# Patient Record
Sex: FEMALE | HISPANIC OR LATINO | Employment: UNEMPLOYED | URBAN - METROPOLITAN AREA
[De-identification: names, ages, dates, MRNs, and addresses within clinical notes are randomized per-mention and may not be internally consistent; named-entity substitution may affect disease eponyms.]

---

## 2020-03-23 ENCOUNTER — OFFICE VISIT (OUTPATIENT)
Dept: FAMILY MEDICINE CLINIC | Facility: CLINIC | Age: 35
End: 2020-03-23
Payer: COMMERCIAL

## 2020-03-23 VITALS
DIASTOLIC BLOOD PRESSURE: 76 MMHG | TEMPERATURE: 98.4 F | BODY MASS INDEX: 36.7 KG/M2 | HEIGHT: 64 IN | WEIGHT: 215 LBS | SYSTOLIC BLOOD PRESSURE: 102 MMHG

## 2020-03-23 DIAGNOSIS — H81.10 BENIGN PAROXYSMAL POSITIONAL VERTIGO, UNSPECIFIED LATERALITY: ICD-10-CM

## 2020-03-23 DIAGNOSIS — G89.29 CHRONIC ARTHRALGIAS OF KNEES AND HIPS: ICD-10-CM

## 2020-03-23 DIAGNOSIS — M79.10 MYALGIA: ICD-10-CM

## 2020-03-23 DIAGNOSIS — M25.551 CHRONIC ARTHRALGIAS OF KNEES AND HIPS: ICD-10-CM

## 2020-03-23 DIAGNOSIS — F51.01 PRIMARY INSOMNIA: Primary | ICD-10-CM

## 2020-03-23 DIAGNOSIS — J30.2 SEASONAL ALLERGIES: ICD-10-CM

## 2020-03-23 DIAGNOSIS — M25.561 CHRONIC ARTHRALGIAS OF KNEES AND HIPS: ICD-10-CM

## 2020-03-23 DIAGNOSIS — M25.552 CHRONIC ARTHRALGIAS OF KNEES AND HIPS: ICD-10-CM

## 2020-03-23 DIAGNOSIS — F32.0 CURRENT MILD EPISODE OF MAJOR DEPRESSIVE DISORDER WITHOUT PRIOR EPISODE (HCC): ICD-10-CM

## 2020-03-23 DIAGNOSIS — Z86.59 HISTORY OF ANXIETY DISORDER: ICD-10-CM

## 2020-03-23 DIAGNOSIS — M25.562 CHRONIC ARTHRALGIAS OF KNEES AND HIPS: ICD-10-CM

## 2020-03-23 PROCEDURE — 1036F TOBACCO NON-USER: CPT | Performed by: FAMILY MEDICINE

## 2020-03-23 PROCEDURE — 99204 OFFICE O/P NEW MOD 45 MIN: CPT | Performed by: FAMILY MEDICINE

## 2020-03-23 PROCEDURE — 3008F BODY MASS INDEX DOCD: CPT | Performed by: FAMILY MEDICINE

## 2020-03-23 RX ORDER — METHOCARBAMOL 500 MG/1
500 TABLET, FILM COATED ORAL 3 TIMES DAILY PRN
COMMUNITY
End: 2020-03-23

## 2020-03-23 RX ORDER — GABAPENTIN 100 MG/1
100 CAPSULE ORAL 3 TIMES DAILY
Qty: 90 CAPSULE | Refills: 2 | Status: SHIPPED | OUTPATIENT
Start: 2020-03-23 | End: 2020-03-24

## 2020-03-23 RX ORDER — MECLIZINE HYDROCHLORIDE 25 MG/1
25 TABLET ORAL 3 TIMES DAILY PRN
COMMUNITY
End: 2020-03-23 | Stop reason: SDUPTHER

## 2020-03-23 RX ORDER — CYCLOBENZAPRINE HCL 5 MG
5 TABLET ORAL
Qty: 30 TABLET | Refills: 2 | Status: SHIPPED | OUTPATIENT
Start: 2020-03-23 | End: 2021-11-04 | Stop reason: SDUPTHER

## 2020-03-23 RX ORDER — OMEPRAZOLE 20 MG/1
20 CAPSULE, DELAYED RELEASE ORAL AS NEEDED
COMMUNITY
Start: 2019-01-02 | End: 2020-06-19 | Stop reason: SDUPTHER

## 2020-03-23 RX ORDER — MECLIZINE HYDROCHLORIDE 25 MG/1
25 TABLET ORAL 3 TIMES DAILY PRN
Qty: 30 TABLET | Refills: 2 | Status: SHIPPED | OUTPATIENT
Start: 2020-03-23 | End: 2020-04-23 | Stop reason: SDUPTHER

## 2020-03-23 RX ORDER — ALBUTEROL SULFATE 90 UG/1
2 AEROSOL, METERED RESPIRATORY (INHALATION) EVERY 4 HOURS PRN
COMMUNITY
Start: 2017-12-12 | End: 2020-04-23 | Stop reason: SDUPTHER

## 2020-03-23 RX ORDER — BUDESONIDE AND FORMOTEROL FUMARATE DIHYDRATE 80; 4.5 UG/1; UG/1
2 AEROSOL RESPIRATORY (INHALATION) 2 TIMES DAILY
COMMUNITY
End: 2020-04-23 | Stop reason: SDUPTHER

## 2020-03-23 RX ORDER — FAMOTIDINE 40 MG/1
20 TABLET, FILM COATED ORAL 2 TIMES DAILY PRN
COMMUNITY
Start: 2018-10-23 | End: 2021-03-18

## 2020-03-23 NOTE — PROGRESS NOTES
Assessment/Plan:  Patient use gabapentin as directed  Patient use Flexeril at night  Patient go for laboratory studies  Further workup pending results  Follow-up in 1 month       Diagnoses and all orders for this visit:    Primary insomnia  -     CBC and differential; Future  -     Comprehensive metabolic panel; Future  -     Lipid panel; Future  -     TSH, 3rd generation with Free T4 reflex; Future  -     RUFINO Screen w/ Reflex to Titer/Pattern; Future  -     C-reactive protein; Future  -     CK (with reflex to MB); Future  -     Lyme Antibody Profile with reflex to WB; Future  -     Vitamin B12; Future  -     Rheumatoid Arthritis Diagnostic Panel 1; Future  -     Magnesium; Future  -     SCLERODERMA DIAGNOSTIC PROFILE; Future  -     cyclobenzaprine (FLEXERIL) 5 mg tablet; Take 1 tablet (5 mg total) by mouth daily at bedtime    Myalgia  -     CBC and differential; Future  -     Comprehensive metabolic panel; Future  -     Lipid panel; Future  -     TSH, 3rd generation with Free T4 reflex; Future  -     RUFINO Screen w/ Reflex to Titer/Pattern; Future  -     C-reactive protein; Future  -     CK (with reflex to MB); Future  -     Lyme Antibody Profile with reflex to WB; Future  -     Vitamin B12; Future  -     Rheumatoid Arthritis Diagnostic Panel 1; Future  -     Magnesium; Future  -     SCLERODERMA DIAGNOSTIC PROFILE; Future  -     cyclobenzaprine (FLEXERIL) 5 mg tablet; Take 1 tablet (5 mg total) by mouth daily at bedtime  -     gabapentin (NEURONTIN) 100 mg capsule; Take 1 capsule (100 mg total) by mouth 3 (three) times a day    Benign paroxysmal positional vertigo, unspecified laterality  -     CBC and differential; Future  -     Comprehensive metabolic panel; Future  -     Lipid panel; Future  -     TSH, 3rd generation with Free T4 reflex; Future  -     RUFINO Screen w/ Reflex to Titer/Pattern; Future  -     C-reactive protein; Future  -     CK (with reflex to MB);  Future  -     Lyme Antibody Profile with reflex to WB; Future  -     Vitamin B12; Future  -     Rheumatoid Arthritis Diagnostic Panel 1; Future  -     Magnesium; Future  -     SCLERODERMA DIAGNOSTIC PROFILE; Future  -     meclizine (ANTIVERT) 25 mg tablet; Take 1 tablet (25 mg total) by mouth 3 (three) times a day as needed for dizziness    Current mild episode of major depressive disorder without prior episode (HCC)    Seasonal allergies  -     CBC and differential; Future  -     Comprehensive metabolic panel; Future  -     Lipid panel; Future  -     TSH, 3rd generation with Free T4 reflex; Future  -     RUFINO Screen w/ Reflex to Titer/Pattern; Future  -     C-reactive protein; Future  -     CK (with reflex to MB); Future  -     Lyme Antibody Profile with reflex to WB; Future  -     Vitamin B12; Future  -     Rheumatoid Arthritis Diagnostic Panel 1; Future  -     Magnesium; Future  -     SCLERODERMA DIAGNOSTIC PROFILE; Future    History of anxiety disorder  -     CBC and differential; Future  -     Comprehensive metabolic panel; Future  -     Lipid panel; Future  -     TSH, 3rd generation with Free T4 reflex; Future  -     RUFNIO Screen w/ Reflex to Titer/Pattern; Future  -     C-reactive protein; Future  -     CK (with reflex to MB); Future  -     Lyme Antibody Profile with reflex to WB; Future  -     Vitamin B12; Future  -     Rheumatoid Arthritis Diagnostic Panel 1; Future  -     Magnesium; Future  -     SCLERODERMA DIAGNOSTIC PROFILE; Future    Chronic arthralgias of knees and hips  -     CBC and differential; Future  -     Comprehensive metabolic panel; Future  -     Lipid panel; Future  -     TSH, 3rd generation with Free T4 reflex; Future  -     RUFINO Screen w/ Reflex to Titer/Pattern; Future  -     C-reactive protein; Future  -     CK (with reflex to MB); Future  -     Lyme Antibody Profile with reflex to WB; Future  -     Vitamin B12; Future  -     Rheumatoid Arthritis Diagnostic Panel 1; Future  -     Magnesium;  Future  -     SCLERODERMA DIAGNOSTIC PROFILE; Future  -     cyclobenzaprine (FLEXERIL) 5 mg tablet; Take 1 tablet (5 mg total) by mouth daily at bedtime  -     gabapentin (NEURONTIN) 100 mg capsule; Take 1 capsule (100 mg total) by mouth 3 (three) times a day    Other orders  -     Discontinue: meclizine (ANTIVERT) 25 mg tablet; Take 25 mg by mouth 3 (three) times a day as needed for dizziness  -     sertraline (ZOLOFT) 50 mg tablet; Take 50 mg by mouth daily  -     Discontinue: methocarbamol (ROBAXIN) 500 mg tablet; Take 500 mg by mouth 3 (three) times a day as needed for muscle spasms  -     albuterol (PROVENTIL HFA,VENTOLIN HFA) 90 mcg/act inhaler; Inhale 2 puffs every 4 (four) hours as needed  -     budesonide-formoterol (Symbicort) 80-4 5 MCG/ACT inhaler; Inhale 2 puffs 2 (two) times a day  -     Levonorgestrel (LILETTA) 19 5 MCG/DAY IUD IUD; 1 each by Intrauterine route  -     omeprazole (PriLOSEC) 20 mg delayed release capsule; Take 20 mg by mouth as needed  -     famotidine (PEPCID) 40 MG tablet; Take 20 mg by mouth 2 (two) times a day as needed            Subjective:        Patient ID: Sarah Whitley is a 36 y o  female  Patient is here as a new patient to establish care  Past medical history surgical history allergies medications family history social history all reviewed at the present time  Patient will have pain diffusely throughout body intermittently  Patient does get pain and lower back also  Pain worse in the cold and with rain  No fevers  The patient having difficulty staying asleep due to the pain  Patient has not been physical therapy  No laboratory studies done  Patient has use meloxicam without any improvement  Patient did use Robaxin with some improvement at night  Patient does get some GERD symptoms intermittently associated with chest pain  Patient does notice some shortness of breath with significant exertion  Patient with history of asthma  No significant change in urination or defecation    Patient with arthralgias  The following portions of the patient's history were reviewed and updated as appropriate: allergies, current medications, past family history, past medical history, past social history, past surgical history and problem list       Review of Systems   Constitutional: Negative  HENT: Negative  Eyes: Negative  Respiratory: Positive for shortness of breath  Cardiovascular: Positive for chest pain  Gastrointestinal: Negative  Endocrine: Negative  Genitourinary: Negative  Musculoskeletal: Positive for back pain  Skin: Negative  Allergic/Immunologic: Negative  Neurological: Negative  Hematological: Negative  Psychiatric/Behavioral: Positive for sleep disturbance  Objective:      BMI Counseling: Body mass index is 36 62 kg/m²  The BMI is above normal  Nutrition recommendations include decreasing portion sizes  Exercise recommendations include moderate physical activity 150 minutes/week  /76 (BP Location: Left arm, Patient Position: Sitting, Cuff Size: Standard)   Temp 98 4 °F (36 9 °C) (Tympanic)   Ht 5' 4 25" (1 632 m)   Wt 97 5 kg (215 lb)   LMP 03/02/2020 (Approximate)   BMI 36 62 kg/m²          Physical Exam   Constitutional: She appears well-developed and well-nourished  No distress  HENT:   Head: Normocephalic  Right Ear: External ear normal    Left Ear: External ear normal    Mouth/Throat: Oropharynx is clear and moist  No oropharyngeal exudate  Eyes: Pupils are equal, round, and reactive to light  EOM are normal  Right eye exhibits no discharge  Left eye exhibits no discharge  No scleral icterus  Neck: Normal range of motion  Neck supple  No thyromegaly present  Cardiovascular: Normal rate, regular rhythm, normal heart sounds and intact distal pulses  Exam reveals no gallop and no friction rub  No murmur heard  Pulmonary/Chest: Effort normal and breath sounds normal  No respiratory distress  She has no wheezes  She has no rales  She exhibits no tenderness  Abdominal: Soft  Bowel sounds are normal  She exhibits no distension  There is no tenderness  There is no rebound and no guarding  Musculoskeletal: She exhibits tenderness  She exhibits no edema  Diffuse myalgias with palpation of thighs and calves the along with biceps and pain with range of motion with upper extremities and lower extremities  Lymphadenopathy:     She has no cervical adenopathy  Neurological: She is alert  No cranial nerve deficit  She exhibits normal muscle tone  Coordination normal    Skin: Skin is warm and dry  No rash noted  She is not diaphoretic  No erythema  No pallor  Psychiatric: She has a normal mood and affect  Her behavior is normal  Judgment and thought content normal    Nursing note and vitals reviewed

## 2020-03-24 ENCOUNTER — APPOINTMENT (OUTPATIENT)
Dept: LAB | Facility: HOSPITAL | Age: 35
End: 2020-03-24
Payer: COMMERCIAL

## 2020-03-24 DIAGNOSIS — M25.562 CHRONIC ARTHRALGIAS OF KNEES AND HIPS: ICD-10-CM

## 2020-03-24 DIAGNOSIS — J30.2 SEASONAL ALLERGIES: ICD-10-CM

## 2020-03-24 DIAGNOSIS — M25.551 CHRONIC ARTHRALGIAS OF KNEES AND HIPS: ICD-10-CM

## 2020-03-24 DIAGNOSIS — Z86.59 HISTORY OF ANXIETY DISORDER: ICD-10-CM

## 2020-03-24 DIAGNOSIS — G89.29 CHRONIC ARTHRALGIAS OF KNEES AND HIPS: ICD-10-CM

## 2020-03-24 DIAGNOSIS — M25.561 CHRONIC ARTHRALGIAS OF KNEES AND HIPS: ICD-10-CM

## 2020-03-24 DIAGNOSIS — M79.10 MYALGIA: ICD-10-CM

## 2020-03-24 DIAGNOSIS — M25.552 CHRONIC ARTHRALGIAS OF KNEES AND HIPS: ICD-10-CM

## 2020-03-24 DIAGNOSIS — H81.10 BENIGN PAROXYSMAL POSITIONAL VERTIGO, UNSPECIFIED LATERALITY: ICD-10-CM

## 2020-03-24 DIAGNOSIS — F51.01 PRIMARY INSOMNIA: ICD-10-CM

## 2020-03-24 LAB
ALBUMIN SERPL BCP-MCNC: 3.4 G/DL (ref 3.5–5)
ALP SERPL-CCNC: 97 U/L (ref 46–116)
ALT SERPL W P-5'-P-CCNC: 21 U/L (ref 12–78)
ANION GAP SERPL CALCULATED.3IONS-SCNC: 8 MMOL/L (ref 4–13)
AST SERPL W P-5'-P-CCNC: 16 U/L (ref 5–45)
BASOPHILS # BLD AUTO: 0.03 THOUSANDS/ΜL (ref 0–0.1)
BASOPHILS NFR BLD AUTO: 0 % (ref 0–1)
BILIRUB SERPL-MCNC: 0.32 MG/DL (ref 0.2–1)
BUN SERPL-MCNC: 10 MG/DL (ref 5–25)
CALCIUM SERPL-MCNC: 8.5 MG/DL (ref 8.3–10.1)
CHLORIDE SERPL-SCNC: 105 MMOL/L (ref 100–108)
CHOLEST SERPL-MCNC: 145 MG/DL (ref 50–200)
CK SERPL-CCNC: 96 U/L (ref 26–192)
CO2 SERPL-SCNC: 26 MMOL/L (ref 21–32)
CREAT SERPL-MCNC: 0.72 MG/DL (ref 0.6–1.3)
CRP SERPL QL: 19.1 MG/L
EOSINOPHIL # BLD AUTO: 0.11 THOUSAND/ΜL (ref 0–0.61)
EOSINOPHIL NFR BLD AUTO: 2 % (ref 0–6)
ERYTHROCYTE [DISTWIDTH] IN BLOOD BY AUTOMATED COUNT: 13.6 % (ref 11.6–15.1)
GFR SERPL CREATININE-BSD FRML MDRD: 127 ML/MIN/1.73SQ M
GLUCOSE P FAST SERPL-MCNC: 97 MG/DL (ref 65–99)
HCT VFR BLD AUTO: 40 % (ref 34.8–46.1)
HDLC SERPL-MCNC: 36 MG/DL
HGB BLD-MCNC: 12.6 G/DL (ref 11.5–15.4)
IMM GRANULOCYTES # BLD AUTO: 0.02 THOUSAND/UL (ref 0–0.2)
IMM GRANULOCYTES NFR BLD AUTO: 0 % (ref 0–2)
LDLC SERPL CALC-MCNC: 99 MG/DL (ref 0–100)
LYMPHOCYTES # BLD AUTO: 1.89 THOUSANDS/ΜL (ref 0.6–4.47)
LYMPHOCYTES NFR BLD AUTO: 28 % (ref 14–44)
MAGNESIUM SERPL-MCNC: 2.1 MG/DL (ref 1.6–2.6)
MCH RBC QN AUTO: 28.8 PG (ref 26.8–34.3)
MCHC RBC AUTO-ENTMCNC: 31.5 G/DL (ref 31.4–37.4)
MCV RBC AUTO: 92 FL (ref 82–98)
MONOCYTES # BLD AUTO: 0.43 THOUSAND/ΜL (ref 0.17–1.22)
MONOCYTES NFR BLD AUTO: 6 % (ref 4–12)
NEUTROPHILS # BLD AUTO: 4.2 THOUSANDS/ΜL (ref 1.85–7.62)
NEUTS SEG NFR BLD AUTO: 64 % (ref 43–75)
NONHDLC SERPL-MCNC: 109 MG/DL
NRBC BLD AUTO-RTO: 0 /100 WBCS
PLATELET # BLD AUTO: 340 THOUSANDS/UL (ref 149–390)
PMV BLD AUTO: 9.5 FL (ref 8.9–12.7)
POTASSIUM SERPL-SCNC: 4 MMOL/L (ref 3.5–5.3)
PROT SERPL-MCNC: 7.7 G/DL (ref 6.4–8.2)
RBC # BLD AUTO: 4.37 MILLION/UL (ref 3.81–5.12)
SODIUM SERPL-SCNC: 139 MMOL/L (ref 136–145)
TRIGL SERPL-MCNC: 48 MG/DL
TSH SERPL DL<=0.05 MIU/L-ACNC: 2.72 UIU/ML (ref 0.36–3.74)
VIT B12 SERPL-MCNC: 306 PG/ML (ref 100–900)
WBC # BLD AUTO: 6.68 THOUSAND/UL (ref 4.31–10.16)

## 2020-03-24 PROCEDURE — 86618 LYME DISEASE ANTIBODY: CPT

## 2020-03-24 PROCEDURE — 36415 COLL VENOUS BLD VENIPUNCTURE: CPT

## 2020-03-24 PROCEDURE — 83735 ASSAY OF MAGNESIUM: CPT

## 2020-03-24 PROCEDURE — 80053 COMPREHEN METABOLIC PANEL: CPT

## 2020-03-24 PROCEDURE — 82550 ASSAY OF CK (CPK): CPT

## 2020-03-24 PROCEDURE — 86430 RHEUMATOID FACTOR TEST QUAL: CPT

## 2020-03-24 PROCEDURE — 84443 ASSAY THYROID STIM HORMONE: CPT

## 2020-03-24 PROCEDURE — 85025 COMPLETE CBC W/AUTO DIFF WBC: CPT

## 2020-03-24 PROCEDURE — 86235 NUCLEAR ANTIGEN ANTIBODY: CPT

## 2020-03-24 PROCEDURE — 86140 C-REACTIVE PROTEIN: CPT

## 2020-03-24 PROCEDURE — 82607 VITAMIN B-12: CPT

## 2020-03-24 PROCEDURE — 80061 LIPID PANEL: CPT

## 2020-03-24 PROCEDURE — 86038 ANTINUCLEAR ANTIBODIES: CPT

## 2020-03-24 RX ORDER — GABAPENTIN 100 MG/1
CAPSULE ORAL
Qty: 270 CAPSULE | Refills: 1 | Status: SHIPPED | OUTPATIENT
Start: 2020-03-24 | End: 2020-11-18

## 2020-03-25 LAB
B BURGDOR IGG+IGM SER-ACNC: <0.91 ISR (ref 0–0.9)
ENA SCL70 AB SER-ACNC: <0.2 AI (ref 0–0.9)
RHEUMATOID FACT SER QL LA: NEGATIVE
RYE IGE QN: NEGATIVE

## 2020-04-23 ENCOUNTER — OFFICE VISIT (OUTPATIENT)
Dept: FAMILY MEDICINE CLINIC | Facility: CLINIC | Age: 35
End: 2020-04-23
Payer: COMMERCIAL

## 2020-04-23 ENCOUNTER — APPOINTMENT (OUTPATIENT)
Dept: RADIOLOGY | Facility: MEDICAL CENTER | Age: 35
End: 2020-04-23
Payer: COMMERCIAL

## 2020-04-23 VITALS
SYSTOLIC BLOOD PRESSURE: 110 MMHG | DIASTOLIC BLOOD PRESSURE: 78 MMHG | BODY MASS INDEX: 36.88 KG/M2 | TEMPERATURE: 98.1 F | HEIGHT: 64 IN | WEIGHT: 216 LBS

## 2020-04-23 DIAGNOSIS — M54.50 CHRONIC BILATERAL LOW BACK PAIN WITHOUT SCIATICA: Primary | ICD-10-CM

## 2020-04-23 DIAGNOSIS — H81.10 BENIGN PAROXYSMAL POSITIONAL VERTIGO, UNSPECIFIED LATERALITY: ICD-10-CM

## 2020-04-23 DIAGNOSIS — M54.50 CHRONIC BILATERAL LOW BACK PAIN WITHOUT SCIATICA: ICD-10-CM

## 2020-04-23 DIAGNOSIS — G89.29 CHRONIC BILATERAL LOW BACK PAIN WITHOUT SCIATICA: Primary | ICD-10-CM

## 2020-04-23 DIAGNOSIS — J45.30 MILD PERSISTENT ASTHMA WITHOUT COMPLICATION: ICD-10-CM

## 2020-04-23 DIAGNOSIS — G89.29 CHRONIC BILATERAL LOW BACK PAIN WITHOUT SCIATICA: ICD-10-CM

## 2020-04-23 DIAGNOSIS — F51.01 PRIMARY INSOMNIA: ICD-10-CM

## 2020-04-23 DIAGNOSIS — M79.10 MYALGIA: ICD-10-CM

## 2020-04-23 PROCEDURE — 3008F BODY MASS INDEX DOCD: CPT | Performed by: FAMILY MEDICINE

## 2020-04-23 PROCEDURE — 99214 OFFICE O/P EST MOD 30 MIN: CPT | Performed by: FAMILY MEDICINE

## 2020-04-23 PROCEDURE — 1036F TOBACCO NON-USER: CPT | Performed by: FAMILY MEDICINE

## 2020-04-23 PROCEDURE — 72110 X-RAY EXAM L-2 SPINE 4/>VWS: CPT

## 2020-04-23 RX ORDER — MECLIZINE HYDROCHLORIDE 25 MG/1
25 TABLET ORAL 3 TIMES DAILY PRN
Qty: 30 TABLET | Refills: 2 | Status: SHIPPED | OUTPATIENT
Start: 2020-04-23 | End: 2020-06-19 | Stop reason: SDUPTHER

## 2020-04-23 RX ORDER — BUDESONIDE AND FORMOTEROL FUMARATE DIHYDRATE 80; 4.5 UG/1; UG/1
2 AEROSOL RESPIRATORY (INHALATION) 2 TIMES DAILY
Qty: 1 INHALER | Refills: 2 | Status: SHIPPED | OUTPATIENT
Start: 2020-04-23 | End: 2021-03-18

## 2020-04-23 RX ORDER — ALBUTEROL SULFATE 90 UG/1
2 AEROSOL, METERED RESPIRATORY (INHALATION) EVERY 4 HOURS PRN
Qty: 1 INHALER | Refills: 2 | Status: SHIPPED | OUTPATIENT
Start: 2020-04-23 | End: 2022-06-24 | Stop reason: SDUPTHER

## 2020-04-23 RX ORDER — DICLOFENAC SODIUM 75 MG/1
75 TABLET, DELAYED RELEASE ORAL 2 TIMES DAILY
Qty: 60 TABLET | Refills: 2 | Status: SHIPPED | OUTPATIENT
Start: 2020-04-23 | End: 2021-05-16

## 2020-06-19 ENCOUNTER — OFFICE VISIT (OUTPATIENT)
Dept: FAMILY MEDICINE CLINIC | Facility: CLINIC | Age: 35
End: 2020-06-19
Payer: COMMERCIAL

## 2020-06-19 VITALS
HEIGHT: 64 IN | WEIGHT: 216.4 LBS | DIASTOLIC BLOOD PRESSURE: 76 MMHG | TEMPERATURE: 97.6 F | SYSTOLIC BLOOD PRESSURE: 110 MMHG | BODY MASS INDEX: 36.95 KG/M2

## 2020-06-19 DIAGNOSIS — H81.10 BENIGN PAROXYSMAL POSITIONAL VERTIGO, UNSPECIFIED LATERALITY: ICD-10-CM

## 2020-06-19 DIAGNOSIS — J45.30 MILD PERSISTENT ASTHMA WITHOUT COMPLICATION: Primary | ICD-10-CM

## 2020-06-19 DIAGNOSIS — J30.2 SEASONAL ALLERGIES: ICD-10-CM

## 2020-06-19 DIAGNOSIS — K21.9 GASTROESOPHAGEAL REFLUX DISEASE WITHOUT ESOPHAGITIS: ICD-10-CM

## 2020-06-19 PROCEDURE — 1036F TOBACCO NON-USER: CPT | Performed by: FAMILY MEDICINE

## 2020-06-19 PROCEDURE — 99214 OFFICE O/P EST MOD 30 MIN: CPT | Performed by: FAMILY MEDICINE

## 2020-06-19 PROCEDURE — 3008F BODY MASS INDEX DOCD: CPT | Performed by: FAMILY MEDICINE

## 2020-06-19 RX ORDER — FEXOFENADINE HCL AND PSEUDOEPHEDRINE HCI 180; 240 MG/1; MG/1
1 TABLET, EXTENDED RELEASE ORAL DAILY
Qty: 90 TABLET | Refills: 1 | Status: SHIPPED | OUTPATIENT
Start: 2020-06-19 | End: 2021-03-18 | Stop reason: SDUPTHER

## 2020-06-19 RX ORDER — OMEPRAZOLE 20 MG/1
20 CAPSULE, DELAYED RELEASE ORAL AS NEEDED
Qty: 90 CAPSULE | Refills: 1 | Status: SHIPPED | OUTPATIENT
Start: 2020-06-19 | End: 2021-06-18 | Stop reason: SDUPTHER

## 2020-06-19 RX ORDER — MECLIZINE HYDROCHLORIDE 25 MG/1
25 TABLET ORAL 3 TIMES DAILY PRN
Qty: 30 TABLET | Refills: 2 | Status: SHIPPED | OUTPATIENT
Start: 2020-06-19 | End: 2021-07-28 | Stop reason: SDUPTHER

## 2020-07-23 ENCOUNTER — OFFICE VISIT (OUTPATIENT)
Dept: FAMILY MEDICINE CLINIC | Facility: CLINIC | Age: 35
End: 2020-07-23
Payer: COMMERCIAL

## 2020-07-23 VITALS
HEIGHT: 64 IN | TEMPERATURE: 97.1 F | BODY MASS INDEX: 37.39 KG/M2 | DIASTOLIC BLOOD PRESSURE: 70 MMHG | SYSTOLIC BLOOD PRESSURE: 110 MMHG | WEIGHT: 219 LBS | OXYGEN SATURATION: 99 % | HEART RATE: 90 BPM

## 2020-07-23 DIAGNOSIS — E66.01 CLASS 2 SEVERE OBESITY DUE TO EXCESS CALORIES WITH SERIOUS COMORBIDITY IN ADULT, UNSPECIFIED BMI (HCC): ICD-10-CM

## 2020-07-23 DIAGNOSIS — J45.30 MILD PERSISTENT ASTHMA WITHOUT COMPLICATION: ICD-10-CM

## 2020-07-23 DIAGNOSIS — K21.9 GASTROESOPHAGEAL REFLUX DISEASE WITHOUT ESOPHAGITIS: Primary | ICD-10-CM

## 2020-07-23 PROBLEM — E66.812 CLASS 2 OBESITY IN ADULT: Status: ACTIVE | Noted: 2020-07-23

## 2020-07-23 PROBLEM — E66.9 CLASS 2 OBESITY IN ADULT: Status: ACTIVE | Noted: 2020-07-23

## 2020-07-23 PROCEDURE — 3008F BODY MASS INDEX DOCD: CPT | Performed by: FAMILY MEDICINE

## 2020-07-23 PROCEDURE — 99213 OFFICE O/P EST LOW 20 MIN: CPT | Performed by: FAMILY MEDICINE

## 2020-07-23 PROCEDURE — 1036F TOBACCO NON-USER: CPT | Performed by: FAMILY MEDICINE

## 2020-07-23 RX ORDER — PHENTERMINE HYDROCHLORIDE 37.5 MG/1
37.5 TABLET ORAL DAILY
Qty: 30 TABLET | Refills: 0 | Status: SHIPPED | OUTPATIENT
Start: 2020-07-23 | End: 2020-08-24 | Stop reason: SDUPTHER

## 2020-07-23 NOTE — PROGRESS NOTES
Assessment/Plan:  BMI is 37 3  Patient start phentermine as directed  Guidance given overall  Patient have low carb diet  Diagnoses and all orders for this visit:    Gastroesophageal reflux disease without esophagitis    Mild persistent asthma without complication    Class 2 severe obesity due to excess calories with serious comorbidity in adult, unspecified BMI (HCC)  -     phentermine (ADIPEX-P) 37 5 MG tablet; Take 1 tablet (37 5 mg total) by mouth daily            Subjective:        Patient ID: Estella Avery is a 36 y o  female  Patient is here to follow-up on asthma GERD  This is stable overall  No nocturnal symptoms  No other chest pain  Patient has noticed some weight the  Patient does feel depressed intermittently which is causing or D more  Patient would like to lose weight  The following portions of the patient's history were reviewed and updated as appropriate: allergies, current medications, past family history, past medical history, past social history, past surgical history and problem list       Review of Systems   Constitutional: Negative  HENT: Negative  Eyes: Negative  Respiratory: Negative  Cardiovascular: Negative  Gastrointestinal: Negative  Endocrine: Negative  Genitourinary: Negative  Musculoskeletal: Negative  Skin: Negative  Allergic/Immunologic: Negative  Neurological: Negative  Hematological: Negative  Psychiatric/Behavioral: Negative  Objective:               /70 (BP Location: Left arm, Patient Position: Sitting, Cuff Size: Large)   Pulse 90   Temp (!) 97 1 °F (36 2 °C) (Tympanic)   Ht 5' 4 25" (1 632 m)   Wt 99 3 kg (219 lb)   SpO2 99%   BMI 37 30 kg/m²          Physical Exam   Constitutional: She appears well-developed and well-nourished  No distress  HENT:   Head: Normocephalic and atraumatic     Right Ear: External ear normal    Left Ear: External ear normal    Eyes: Pupils are equal, round, and reactive to light  EOM are normal  Right eye exhibits no discharge  Left eye exhibits no discharge  No scleral icterus  Neck: Normal range of motion  Neck supple  No thyromegaly present  Cardiovascular: Normal rate, regular rhythm, normal heart sounds and intact distal pulses  Exam reveals no gallop and no friction rub  No murmur heard  Pulmonary/Chest: Effort normal and breath sounds normal  No respiratory distress  She has no wheezes  She has no rales  She exhibits no tenderness  Musculoskeletal: Normal range of motion  She exhibits no edema or tenderness  Lymphadenopathy:     She has no cervical adenopathy  Neurological: She is alert  No cranial nerve deficit  She exhibits normal muscle tone  Coordination normal    Skin: Skin is warm and dry  No rash noted  She is not diaphoretic  No erythema  No pallor  Psychiatric: She has a normal mood and affect   Her behavior is normal  Judgment and thought content normal

## 2020-08-24 ENCOUNTER — OFFICE VISIT (OUTPATIENT)
Dept: FAMILY MEDICINE CLINIC | Facility: CLINIC | Age: 35
End: 2020-08-24
Payer: COMMERCIAL

## 2020-08-24 VITALS
SYSTOLIC BLOOD PRESSURE: 116 MMHG | WEIGHT: 206.8 LBS | DIASTOLIC BLOOD PRESSURE: 76 MMHG | BODY MASS INDEX: 35.3 KG/M2 | HEIGHT: 64 IN | TEMPERATURE: 97.6 F

## 2020-08-24 DIAGNOSIS — M54.50 CHRONIC BILATERAL LOW BACK PAIN WITHOUT SCIATICA: Primary | ICD-10-CM

## 2020-08-24 DIAGNOSIS — G89.29 CHRONIC BILATERAL LOW BACK PAIN WITHOUT SCIATICA: Primary | ICD-10-CM

## 2020-08-24 DIAGNOSIS — E66.01 CLASS 2 SEVERE OBESITY DUE TO EXCESS CALORIES WITH SERIOUS COMORBIDITY IN ADULT, UNSPECIFIED BMI (HCC): ICD-10-CM

## 2020-08-24 PROCEDURE — 1036F TOBACCO NON-USER: CPT | Performed by: FAMILY MEDICINE

## 2020-08-24 PROCEDURE — 99213 OFFICE O/P EST LOW 20 MIN: CPT | Performed by: FAMILY MEDICINE

## 2020-08-24 PROCEDURE — 3725F SCREEN DEPRESSION PERFORMED: CPT | Performed by: FAMILY MEDICINE

## 2020-08-24 PROCEDURE — 3008F BODY MASS INDEX DOCD: CPT | Performed by: FAMILY MEDICINE

## 2020-08-24 RX ORDER — PHENTERMINE HYDROCHLORIDE 37.5 MG/1
37.5 TABLET ORAL DAILY
Qty: 30 TABLET | Refills: 2 | Status: SHIPPED | OUTPATIENT
Start: 2020-08-24 | End: 2021-03-18

## 2020-08-24 NOTE — PROGRESS NOTES
Assessment/Plan:  BMI 35 2  Back pain stable at this time  Continue current regimen  Patient will continue with phentermine for weight loss which is helping  Follow-up in 3 months       Diagnoses and all orders for this visit:    Chronic bilateral low back pain without sciatica    Class 2 severe obesity due to excess calories with serious comorbidity in adult, unspecified BMI (HCC)  -     phentermine (ADIPEX-P) 37 5 MG tablet; Take 1 tablet (37 5 mg total) by mouth daily            Subjective:        Patient ID: Davin Jauregui is a 36 y o  female  Patient is here to follow-up on back pain  This is been relatively stable overall  Patient is using diclofenac with good results  The patient has lost weight with phentermine  No chest pain  Patient has had some palpitations intermittently as well as dry mouth  Patient also did have headache intermittently  No problems with urination or defecation  The following portions of the patient's history were reviewed and updated as appropriate: allergies, current medications, past family history, past medical history, past social history, past surgical history and problem list       Review of Systems   Constitutional: Negative  HENT: Negative  Eyes: Negative  Respiratory: Negative  Cardiovascular: Positive for palpitations  Gastrointestinal: Negative  Endocrine: Negative  Genitourinary: Negative  Musculoskeletal: Negative  Skin: Negative  Allergic/Immunologic: Negative  Neurological: Positive for headaches  Hematological: Negative  Psychiatric/Behavioral: Negative  Objective:               /76 (BP Location: Right arm, Patient Position: Sitting, Cuff Size: Standard)   Temp 97 6 °F (36 4 °C) (Tympanic)   Ht 5' 4 25" (1 632 m)   Wt 93 8 kg (206 lb 12 8 oz)   BMI 35 22 kg/m²          Physical Exam  Vitals signs and nursing note reviewed  Constitutional:       General: She is not in acute distress  Appearance: Normal appearance  She is well-developed  She is not diaphoretic  HENT:      Head: Normocephalic and atraumatic  Right Ear: External ear normal       Left Ear: External ear normal       Nose: Nose normal  No congestion  Eyes:      General: No scleral icterus  Right eye: No discharge  Left eye: No discharge  Pupils: Pupils are equal, round, and reactive to light  Neck:      Musculoskeletal: Normal range of motion and neck supple  Thyroid: No thyromegaly  Cardiovascular:      Rate and Rhythm: Normal rate and regular rhythm  Heart sounds: Normal heart sounds  No murmur  No friction rub  No gallop  Pulmonary:      Effort: Pulmonary effort is normal  No respiratory distress  Breath sounds: Normal breath sounds  No wheezing or rales  Chest:      Chest wall: No tenderness  Musculoskeletal: Normal range of motion  General: No tenderness  Lymphadenopathy:      Cervical: No cervical adenopathy  Skin:     General: Skin is warm and dry  Coloration: Skin is not pale  Findings: No erythema or rash  Neurological:      Mental Status: She is alert and oriented to person, place, and time  Cranial Nerves: No cranial nerve deficit  Motor: No abnormal muscle tone  Coordination: Coordination normal    Psychiatric:         Behavior: Behavior normal          Thought Content:  Thought content normal          Judgment: Judgment normal

## 2020-11-18 ENCOUNTER — OFFICE VISIT (OUTPATIENT)
Dept: FAMILY MEDICINE CLINIC | Facility: CLINIC | Age: 35
End: 2020-11-18
Payer: COMMERCIAL

## 2020-11-18 ENCOUNTER — TELEPHONE (OUTPATIENT)
Dept: SURGICAL ONCOLOGY | Facility: CLINIC | Age: 35
End: 2020-11-18

## 2020-11-18 VITALS
BODY MASS INDEX: 34.62 KG/M2 | DIASTOLIC BLOOD PRESSURE: 80 MMHG | TEMPERATURE: 97.4 F | SYSTOLIC BLOOD PRESSURE: 122 MMHG | HEIGHT: 64 IN | WEIGHT: 202.8 LBS

## 2020-11-18 DIAGNOSIS — F32.0 CURRENT MILD EPISODE OF MAJOR DEPRESSIVE DISORDER, UNSPECIFIED WHETHER RECURRENT (HCC): ICD-10-CM

## 2020-11-18 DIAGNOSIS — J45.30 MILD PERSISTENT ASTHMA WITHOUT COMPLICATION: Primary | ICD-10-CM

## 2020-11-18 DIAGNOSIS — N63.20 LEFT BREAST MASS: ICD-10-CM

## 2020-11-18 DIAGNOSIS — Z23 NEED FOR INFLUENZA VACCINATION: ICD-10-CM

## 2020-11-18 DIAGNOSIS — K21.9 GASTROESOPHAGEAL REFLUX DISEASE WITHOUT ESOPHAGITIS: ICD-10-CM

## 2020-11-18 PROCEDURE — 90471 IMMUNIZATION ADMIN: CPT

## 2020-11-18 PROCEDURE — 1036F TOBACCO NON-USER: CPT | Performed by: FAMILY MEDICINE

## 2020-11-18 PROCEDURE — 90682 RIV4 VACC RECOMBINANT DNA IM: CPT

## 2020-11-18 PROCEDURE — 3008F BODY MASS INDEX DOCD: CPT | Performed by: FAMILY MEDICINE

## 2020-11-18 PROCEDURE — 99214 OFFICE O/P EST MOD 30 MIN: CPT | Performed by: FAMILY MEDICINE

## 2020-12-21 ENCOUNTER — TELEPHONE (OUTPATIENT)
Dept: FAMILY MEDICINE CLINIC | Facility: CLINIC | Age: 35
End: 2020-12-21

## 2020-12-22 DIAGNOSIS — F41.0 PANIC ATTACK: ICD-10-CM

## 2020-12-22 DIAGNOSIS — Z86.59 HX OF ANXIETY DISORDER: Primary | ICD-10-CM

## 2021-01-29 ENCOUNTER — TELEPHONE (OUTPATIENT)
Dept: SURGICAL ONCOLOGY | Facility: CLINIC | Age: 36
End: 2021-01-29

## 2021-02-03 ENCOUNTER — CONSULT (OUTPATIENT)
Dept: SURGICAL ONCOLOGY | Facility: CLINIC | Age: 36
End: 2021-02-03
Payer: COMMERCIAL

## 2021-02-03 VITALS
TEMPERATURE: 97.5 F | HEIGHT: 64 IN | BODY MASS INDEX: 34.83 KG/M2 | WEIGHT: 204 LBS | SYSTOLIC BLOOD PRESSURE: 120 MMHG | HEART RATE: 107 BPM | DIASTOLIC BLOOD PRESSURE: 84 MMHG

## 2021-02-03 DIAGNOSIS — Z80.3 FAMILY HISTORY OF BREAST CANCER: ICD-10-CM

## 2021-02-03 DIAGNOSIS — Z80.41 FAMILY HISTORY OF OVARIAN CANCER: ICD-10-CM

## 2021-02-03 DIAGNOSIS — R92.8 ABNORMAL FINDING ON BREAST IMAGING: Primary | ICD-10-CM

## 2021-02-03 PROCEDURE — 99243 OFF/OP CNSLTJ NEW/EST LOW 30: CPT | Performed by: SURGERY

## 2021-02-03 NOTE — PROGRESS NOTES
Breast Consultation-Surgical Oncology     [address] [facility] [place] [facility] [address]    Name:  Celeste Barroso  YOB: 1985  MRN:  [id number]    Assessment/Plan   Diagnoses and all orders for this visit:    Abnormal finding on breast imaging  -     US breast left limited (diagnostic); Future    Family history of breast cancer  -     Ambulatory Referral to Oncology Genetics; Future    Family history of ovarian cancer  -     Ambulatory Referral to Oncology Genetics; Future          HPI: Celeste Barroso is a 30y o  year old female who presented  With a left breast lump  She had diagnostic imaging and an ultrasound revealing benign fibrocystic changes  She also reported nipple discharge which she noted after leaning on something  She states that the color of the fluid was yellow  She reports family history of breast cancer in her maternal grandmother  She also states that her maternal aunt had ovarian cancer  Surgical treatment to date consisted of   Not applicable  Oncology History:    Oncology History    No history exists         Pertinent reproductive history:  OB History        3    Para   2    Term                AB        Living           SAB        TAB        Ectopic        Multiple        Live Births               Obstetric Comments   Menarche-9  Age at first pregnancy-18  depro-1yr  bcp-5yrs               Problem List:   Patient Active Problem List   Diagnosis    Asthma, mild persistent    BPPV (benign paroxysmal positional vertigo)    Major depressive disorder    Seasonal allergies    History of anxiety disorder    Primary insomnia    Chronic arthralgias of knees and hips    Myalgia    Chronic bilateral low back pain without sciatica    Gastroesophageal reflux disease without esophagitis    Class 2 obesity in adult    Left breast mass    Abnormal finding on breast imaging    Family history of breast cancer    Family history of ovarian cancer     Past Medical History:   Diagnosis Date    Anxiety     Asthma     Depression     GERD (gastroesophageal reflux disease)     Obesity     Scoliosis     Visual impairment      Past Surgical History:   Procedure Laterality Date    HERNIA REPAIR       Family History   Problem Relation Age of Onset    Thyroid disease Mother     Arthritis Mother     Depression Father     Arthritis Father     No Known Problems Sister     No Known Problems Brother     No Known Problems Son     Coronary artery disease Maternal Grandmother     Hyperlipidemia Maternal Grandmother     Thyroid disease Maternal Grandmother     Asthma Maternal Grandmother     Arthritis Maternal Grandmother     Vision loss Maternal Grandmother     Breast cancer Maternal Grandmother         age unknown    Hyperlipidemia Maternal Grandfather     Thyroid disease Maternal Grandfather     Ovarian cancer Maternal Aunt         46s     Social History     Socioeconomic History    Marital status: Single     Spouse name: Not on file    Number of children: Not on file    Years of education: Not on file    Highest education level: Not on file   Occupational History     Employer: [employer]   Social Needs    Financial resource strain: Not hard at all   [place] insecurity     Worry: Never true     Inability: Never true    Transportation needs     Medical: No     Non-medical: No   Tobacco Use    Smoking status: Former Smoker     Packs/day: 0 50     Quit date:      Years since quittin 1    Smokeless tobacco: Former User   Substance and Sexual Activity    Alcohol use: Yes     Frequency: 2-4 times a month     Drinks per session: 1 or 2    Drug use: Never    Sexual activity: Not Currently   Lifestyle    Physical activity     Days per week: 3 days     Minutes per session: 30 min    Stress: Not at all   Relationships    Social connections     Talks on phone: More than three times a week     Gets together: More than three times a week     Attends Jew service: 1 to 4 times per year     Active member of club or organization: No     Attends meetings of clubs or organizations: Never     Relationship status: Never     Intimate partner violence     Fear of current or ex partner: No     Emotionally abused: No     Physically abused: No     Forced sexual activity: No   Other Topics Concern    Not on file   Social History Narrative    Not on file     Current Outpatient Medications   Medication Sig Dispense Refill    albuterol (PROVENTIL HFA,VENTOLIN HFA) 90 mcg/act inhaler Inhale 2 puffs every 4 (four) hours as needed (as needed) 1 Inhaler 2    budesonide-formoterol (Symbicort) 80-4 5 MCG/ACT inhaler Inhale 2 puffs 2 (two) times a day 1 Inhaler 2    cyclobenzaprine (FLEXERIL) 5 mg tablet Take 1 tablet (5 mg total) by mouth daily at bedtime 30 tablet 2    diclofenac (VOLTAREN) 75 mg EC tablet Take 1 tablet (75 mg total) by mouth 2 (two) times a day 60 tablet 2    famotidine (PEPCID) 40 MG tablet Take 20 mg by mouth 2 (two) times a day as needed      fexofenadine-pseudoephedrine (ALLEGRA-D 24) 180-240 MG per 24 hr tablet Take 1 tablet by mouth daily 90 tablet 1    Levonorgestrel (LILETTA) 19 5 MCG/DAY IUD IUD 1 each by Intrauterine route      meclizine (ANTIVERT) 25 mg tablet Take 1 tablet (25 mg total) by mouth 3 (three) times a day as needed for dizziness 30 tablet 2    omeprazole (PriLOSEC) 20 mg delayed release capsule Take 1 capsule (20 mg total) by mouth as needed (as needed) 90 capsule 1    phentermine (ADIPEX-P) 37 5 MG tablet Take 1 tablet (37 5 mg total) by mouth daily 30 tablet 2    sertraline (ZOLOFT) 50 mg tablet Take 50 mg by mouth daily       No current facility-administered medications for this visit        No Known Allergies      The following portions of the patient's history were reviewed and updated as appropriate: allergies, current medications, past family history, past medical history, past social history, past surgical history and problem list     Review of Systems:  Review of Systems   Constitutional: Negative  Negative for appetite change and fever  Eyes: Negative  Respiratory: Negative for shortness of breath  Cardiovascular: Negative  Gastrointestinal: Positive for constipation and diarrhea  Endocrine: Negative  Genitourinary: Negative  Musculoskeletal: Positive for arthralgias  Negative for myalgias  Skin: Negative  Allergic/Immunologic: Negative  Neurological: Positive for headaches  Hematological: Negative  Negative for adenopathy  Does not bruise/bleed easily  Psychiatric/Behavioral: Negative  Physical Exam:  Physical Exam  Constitutional:       General: She is not in acute distress  Appearance: She is well-developed  HENT:      Head: Normocephalic and atraumatic  Chest:      Breasts:         Right: No inverted nipple, mass, nipple discharge, skin change or tenderness  Left: Tenderness present  No inverted nipple, mass, nipple discharge or skin change  Comments:  Nodular tissue far upper outer left breast in the area of the known cysts but no discrete masses, associated tenderness  Lymphadenopathy:      Upper Body:      Right upper body: No supraclavicular, axillary or pectoral adenopathy  Left upper body: No supraclavicular, axillary or pectoral adenopathy  Neurological:      Mental Status: She is alert and oriented to person, place, and time     Psychiatric:         Mood and Affect: Mood normal                Imagin2020 bilateral 3D diagnostic mammogram and left breast ultrasound revealed dense breast tissue, there was a complex cyst at the 0200 hours axis, there was also a cluster of cyst in the far upper outer left breast at 1-2 o'clock, normal left axillary node, no subareolar lesions; a six month follow-up ultrasound was recommended of the left breast         Discussion/Summary: 55-year-old female who presents today secondary to a left breast lump  She does have underlying fibrocystic changes  She also reports having had nipple discharge  I do not appreciate this on examination today  There were no subareolar lesions on her recent ultrasound  A six month follow-up  Ultrasound of the left breast was recommended  I agree with this recommendation  I will order this for her  Additionally given the family history of both breast and ovarian cancer on the maternal side of the family, I am referring her to our genetic counselor  I will see her again in six months or sooner should the need arise

## 2021-02-11 ENCOUNTER — TELEPHONE (OUTPATIENT)
Dept: SURGICAL ONCOLOGY | Facility: CLINIC | Age: 36
End: 2021-02-11

## 2021-02-11 NOTE — TELEPHONE ENCOUNTER
Genetics New Patient Intake Form    Patient Details:      Holly Lawler     1985     [id number]     Background Information:         Who is calling to schedule?                                            self    If not self, relationship to patient? Referring Provider Alex Sepulveda     Is the referral marked STAT No    Is patient newly diagnosed with cancer, have metastatic disease, or pending surgery? No    If yes, which is it?  na    If the patient is pending surgery Needs to be scheduled within 48 hours  If none available, schedule patient then email Stat cancer genetics    If the patient is metastatic or newly diagnosed Needs to be scheduled within 2 weeks  If none available, schedule patient then email Stat cancer genetics    Have you had genetic testing that showed a positive genetic mutation? (If yes, schedule within 2 weeks or email STAT cancer genetics) No    Has your family member had genetic testing that resulted in a positive genetic mutation? (If yes in the last 6 months, schedule within 3 weeks )  (If yes but over 6 months, schedule as usual) No    Is this a personal or family history?  family    What is the type of tumor? Breast     Scheduling Information:    Preferred [place]:  [place]         Are there any dates/time the patient cannot be seen? Yes    Did the patient schedule an appointment?  Yes    If yes, list appointment date and provider name 4/28 Diogo    If no, briefly state why na    Miscellaneous: na    After completing the above information, please route to Oncology Genetics

## 2021-03-18 ENCOUNTER — OFFICE VISIT (OUTPATIENT)
Dept: FAMILY MEDICINE CLINIC | Facility: CLINIC | Age: 36
End: 2021-03-18
Payer: COMMERCIAL

## 2021-03-18 VITALS
DIASTOLIC BLOOD PRESSURE: 82 MMHG | SYSTOLIC BLOOD PRESSURE: 100 MMHG | WEIGHT: 205 LBS | BODY MASS INDEX: 35 KG/M2 | HEIGHT: 64 IN

## 2021-03-18 DIAGNOSIS — J30.2 SEASONAL ALLERGIES: ICD-10-CM

## 2021-03-18 DIAGNOSIS — J45.30 MILD PERSISTENT ASTHMA, UNSPECIFIED WHETHER COMPLICATED: Primary | ICD-10-CM

## 2021-03-18 DIAGNOSIS — J45.30 MILD PERSISTENT ASTHMA WITHOUT COMPLICATION: ICD-10-CM

## 2021-03-18 DIAGNOSIS — E66.9 OBESITY (BMI 30-39.9): ICD-10-CM

## 2021-03-18 DIAGNOSIS — F32.0 CURRENT MILD EPISODE OF MAJOR DEPRESSIVE DISORDER, UNSPECIFIED WHETHER RECURRENT (HCC): ICD-10-CM

## 2021-03-18 PROCEDURE — 99214 OFFICE O/P EST MOD 30 MIN: CPT | Performed by: FAMILY MEDICINE

## 2021-03-18 PROCEDURE — 1036F TOBACCO NON-USER: CPT | Performed by: FAMILY MEDICINE

## 2021-03-18 RX ORDER — NALTREXONE HYDROCHLORIDE AND BUPROPION HYDROCHLORIDE 8; 90 MG/1; MG/1
2 TABLET, EXTENDED RELEASE ORAL 2 TIMES DAILY
Qty: 120 TABLET | Refills: 3 | Status: SHIPPED | OUTPATIENT
Start: 2021-03-18 | End: 2021-06-18

## 2021-03-18 RX ORDER — ALBUTEROL SULFATE 2.5 MG/3ML
2.5 SOLUTION RESPIRATORY (INHALATION) EVERY 6 HOURS PRN
Qty: 25 VIAL | Refills: 5 | Status: SHIPPED | OUTPATIENT
Start: 2021-03-18 | End: 2022-06-24 | Stop reason: SDUPTHER

## 2021-03-18 RX ORDER — BUDESONIDE AND FORMOTEROL FUMARATE DIHYDRATE 160; 4.5 UG/1; UG/1
2 AEROSOL RESPIRATORY (INHALATION) 2 TIMES DAILY
Qty: 1 INHALER | Refills: 5 | Status: SHIPPED | OUTPATIENT
Start: 2021-03-18 | End: 2022-06-24 | Stop reason: SDUPTHER

## 2021-03-18 RX ORDER — FEXOFENADINE HCL AND PSEUDOEPHEDRINE HCI 180; 240 MG/1; MG/1
1 TABLET, EXTENDED RELEASE ORAL DAILY
Qty: 90 TABLET | Refills: 1 | Status: SHIPPED | OUTPATIENT
Start: 2021-03-18 | End: 2022-05-02

## 2021-03-18 NOTE — PROGRESS NOTES
Assessment/Plan:  Depression relatively stable  Patient has Symbicort increased for asthma  Guidance given in this regard  Patient will continue with Allegra D for allergies  Mood stable overall continue with sertraline  Refills given  Patient will continue with counseling  Patient will try Contrave for weight loss  Follow-up in 4 months       Diagnoses and all orders for this visit:    Mild persistent asthma, unspecified whether complicated  -     albuterol (2 5 mg/3 mL) 0 083 % nebulizer solution; Take 1 vial (2 5 mg total) by nebulization every 6 (six) hours as needed for wheezing or shortness of breath  -     budesonide-formoterol (SYMBICORT) 160-4 5 mcg/act inhaler; Inhale 2 puffs 2 (two) times a day Rinse mouth after use  Seasonal allergies  -     fexofenadine-pseudoephedrine (ALLEGRA-D 24) 180-240 MG per 24 hr tablet; Take 1 tablet by mouth daily    Mild persistent asthma without complication    Current mild episode of major depressive disorder, unspecified whether recurrent (HCC)  -     sertraline (ZOLOFT) 50 mg tablet; Take 1 tablet (50 mg total) by mouth daily    Obesity (BMI 30-39 9)  -     Naltrexone-buPROPion HCl ER (Contrave) 8-90 MG TB12; Take 2 tablets by mouth 2 (two) times a day            Subjective:        Patient ID: Lavelle Godwin is a 30 y o  female  Patient here to follow-up on asthma as well as allergies  Patient's asthma fairly well controlled  Patient does need use albuterol intermittently  No nocturnal symptoms  Patient does notice some shortness of breath with exertion  Patient did have some chest pain associated with phentermine  Patient stop medication  No problems with urination defecation  Patient has allergy symptoms have worsened recently  Patient will need refills  Patient is given counseling the for mood/depression          The following portions of the patient's history were reviewed and updated as appropriate: allergies, current medications, past family history, past medical history, past social history, past surgical history and problem list       Review of Systems   Constitutional: Negative  HENT: Positive for congestion, postnasal drip and rhinorrhea  Eyes: Negative  Respiratory: Positive for shortness of breath and wheezing  Cardiovascular: Negative  Gastrointestinal: Negative  Endocrine: Negative  Genitourinary: Negative  Musculoskeletal: Negative  Skin: Negative  Allergic/Immunologic: Negative  Neurological: Negative  Hematological: Negative  Psychiatric/Behavioral: Negative  Objective:      BMI Counseling: Body mass index is 35 19 kg/m²  The BMI is above normal  Nutrition recommendations include decreasing portion sizes  Exercise recommendations include moderate physical activity 150 minutes/week  Depression Screening and Follow-up Plan: Patient assessed for underlying major depression  Brief counseling provided and recommend additional follow-up/re-evaluation next office visit  /82 (BP Location: Right arm, Patient Position: Sitting, Cuff Size: Standard)   Ht 5' 4" (1 626 m)   Wt 93 kg (205 lb)   BMI 35 19 kg/m²          Physical Exam  Vitals signs and nursing note reviewed  Constitutional:       General: She is not in acute distress  Appearance: Normal appearance  She is not ill-appearing, toxic-appearing or diaphoretic  HENT:      Head: Normocephalic and atraumatic  Right Ear: Tympanic membrane, ear canal and external ear normal  There is no impacted cerumen  Left Ear: Tympanic membrane, ear canal and external ear normal  There is no impacted cerumen  Nose: Nose normal  No congestion or rhinorrhea  Mouth/Throat:      Mouth: Mucous membranes are moist       Pharynx: No oropharyngeal exudate or posterior oropharyngeal erythema  Eyes:      General: No scleral icterus  Right eye: No discharge  Left eye: No discharge        Extraocular Movements: Extraocular movements intact  Conjunctiva/sclera: Conjunctivae normal       Pupils: Pupils are equal, round, and reactive to light  Neck:      Musculoskeletal: Normal range of motion and neck supple  No neck rigidity or muscular tenderness  Vascular: No carotid bruit  Cardiovascular:      Rate and Rhythm: Normal rate and regular rhythm  Pulses: Normal pulses  Heart sounds: Normal heart sounds  No murmur  No friction rub  No gallop  Pulmonary:      Effort: Pulmonary effort is normal  No respiratory distress  Breath sounds: Normal breath sounds  No stridor  No wheezing, rhonchi or rales  Chest:      Chest wall: No tenderness  Abdominal:      General: Abdomen is flat  Bowel sounds are normal  There is no distension  Palpations: Abdomen is soft  Tenderness: There is no abdominal tenderness  There is no guarding or rebound  Musculoskeletal: Normal range of motion  General: No swelling, tenderness, deformity or signs of injury  Right lower leg: No edema  Left lower leg: No edema  Lymphadenopathy:      Cervical: No cervical adenopathy  Skin:     General: Skin is warm and dry  Capillary Refill: Capillary refill takes less than 2 seconds  Coloration: Skin is not jaundiced  Findings: No bruising, erythema, lesion or rash  Neurological:      General: No focal deficit present  Mental Status: She is alert and oriented to person, place, and time  Cranial Nerves: No cranial nerve deficit  Sensory: No sensory deficit  Motor: No weakness  Coordination: Coordination normal       Gait: Gait normal    Psychiatric:         Mood and Affect: Mood normal          Behavior: Behavior normal          Thought Content:  Thought content normal          Judgment: Judgment normal

## 2021-03-24 ENCOUNTER — TELEPHONE (OUTPATIENT)
Dept: OBGYN CLINIC | Facility: MEDICAL CENTER | Age: 36
End: 2021-03-24

## 2021-04-06 ENCOUNTER — OFFICE VISIT (OUTPATIENT)
Dept: OBGYN CLINIC | Facility: MEDICAL CENTER | Age: 36
End: 2021-04-06
Payer: COMMERCIAL

## 2021-04-06 VITALS
DIASTOLIC BLOOD PRESSURE: 80 MMHG | SYSTOLIC BLOOD PRESSURE: 110 MMHG | HEIGHT: 64 IN | BODY MASS INDEX: 35.51 KG/M2 | WEIGHT: 208 LBS

## 2021-04-06 DIAGNOSIS — R10.2 PELVIC PAIN: ICD-10-CM

## 2021-04-06 DIAGNOSIS — Z01.419 WELL WOMAN EXAM WITH ROUTINE GYNECOLOGICAL EXAM: Primary | ICD-10-CM

## 2021-04-06 DIAGNOSIS — Z97.5 IUD (INTRAUTERINE DEVICE) IN PLACE: ICD-10-CM

## 2021-04-06 PROCEDURE — S0610 ANNUAL GYNECOLOGICAL EXAMINA: HCPCS | Performed by: OBSTETRICS & GYNECOLOGY

## 2021-04-06 PROCEDURE — 3008F BODY MASS INDEX DOCD: CPT | Performed by: FAMILY MEDICINE

## 2021-04-06 NOTE — PROGRESS NOTES
Assessment   30 y o  [id number] with amenorrhea on IUD who is not currently sexually active and currently using contraception (Rahat Peace IUD, placed 9/2019, switched from [facility]) presenting for annual exam      Plan   Diagnoses and all orders for this visit:    Well woman exam with routine gynecological exam  - Pap up to date  - Clinicians breast exam done  - Return in 1yr for yearly    Pelvic pain  IUD (intrauterine device) in place  -     US pelvis complete w transvaginal; Future      __________________________________________________________________      Shanti Toscano is a 30 y o  [id number] with amenorrhea on IUD who is not currently sexually active and currently using contraception (Rahat Peace IUD, placed 9/2019, switched from [facility]) presenting for annual exam      Reporting pelvic pain and pressure for a few months  Mild and intermittent in nature  Reports feels like "contractions " Associated with BM or sudden position shifts  Radiates across pelvis; not localized to a singe spot  Typically amenorrhea with IUD, but sometimes wipes and sees small clots  GYN  Complaints: denies  Denies dysmenorrhea, genital discharge, genital ulcers and vulvar/vaginal symptoms  Periods are largely absent with IUD  Dysmenorrhea:mild, occurring unsure due to lack of menses     Cyclic symptoms include breast tenderness  Sexually active: No  Hx STI: denies   Hx Abnormal pap: denies  Last pap: 2019 - NILM  Unsure if had gardasil    OB  [id number] (SVDx2, SABx2) 18 & 55HI  Pregnancy complications: daughter with CP and hydrocephalus  Does not desire future fertility      Complaints: denies  Denies urinary frequency, hematuria, urinary incontinence and dysuria    BREAST  Complaints: denies apart from menstrual tenderness  Denies: breast lump, breast tenderness, changed mole, dryness, nipple discharge, pruritus, rash, skin color change and skin lesion(s)  Last mammogram: 11/2020 - birads3, small cysts on left  Personal hx: diag US scheduled in May  Follows with Dr Shilpi Sage  Family hx:   Maternal grandmother with breast ca (unsure age)  Maternal aunt with ovarian ca  Maternal aunt with colon ca (early 46s)  Patient does do regular self-exams    GENERAL  PMH reviewed/updated and is as below  Patient does follow with a PCP  Works as a support person for inetl delayed patients  Denies domestic violence  Exercise: walking, uses a  3x/wk  Diet: decreased fried/greasy foods, more plant based, inc water intake  -11lbs from starting!     SCREENING  Cervical Ca: pap up to date  Breast Ca: follows with breast surgery, US scheduled  Colon Ca: not indicated by age; advised may benefit from earlier screening with FHx      Past Medical History:   Diagnosis Date    Anxiety     Asthma     Depression     GERD (gastroesophageal reflux disease)     Obesity     Scoliosis     Visual impairment        Past Surgical History:   Procedure Laterality Date    HERNIA REPAIR           Current Outpatient Medications:     albuterol (PROVENTIL HFA,VENTOLIN HFA) 90 mcg/act inhaler, Inhale 2 puffs every 4 (four) hours as needed (as needed), Disp: 1 Inhaler, Rfl: 2    budesonide-formoterol (SYMBICORT) 160-4 5 mcg/act inhaler, Inhale 2 puffs 2 (two) times a day Rinse mouth after use , Disp: 1 Inhaler, Rfl: 5    cyclobenzaprine (FLEXERIL) 5 mg tablet, Take 1 tablet (5 mg total) by mouth daily at bedtime, Disp: 30 tablet, Rfl: 2    diclofenac (VOLTAREN) 75 mg EC tablet, Take 1 tablet (75 mg total) by mouth 2 (two) times a day, Disp: 60 tablet, Rfl: 2    fexofenadine-pseudoephedrine (ALLEGRA-D 24) 180-240 MG per 24 hr tablet, Take 1 tablet by mouth daily, Disp: 90 tablet, Rfl: 1    meclizine (ANTIVERT) 25 mg tablet, Take 1 tablet (25 mg total) by mouth 3 (three) times a day as needed for dizziness, Disp: 30 tablet, Rfl: 2    omeprazole (PriLOSEC) 20 mg delayed release capsule, Take 1 capsule (20 mg total) by mouth as needed (as needed), Disp: 90 capsule, Rfl: 1    sertraline (ZOLOFT) 50 mg tablet, Take 1 tablet (50 mg total) by mouth daily, Disp: 30 tablet, Rfl: 4    albuterol (2 5 mg/3 mL) 0 083 % nebulizer solution, Take 1 vial (2 5 mg total) by nebulization every 6 (six) hours as needed for wheezing or shortness of breath, Disp: 25 vial, Rfl: 5    Levonorgestrel (LILETTA) 19 5 MCG/DAY IUD IUD, 1 each by Intrauterine route, Disp: , Rfl:     Naltrexone-buPROPion HCl ER (Contrave) 8-90 MG TB12, Take 2 tablets by mouth 2 (two) times a day (Patient not taking: Reported on 2021), Disp: 120 tablet, Rfl: 3    No Known Allergies    Social History     Tobacco Use    Smoking status: Former Smoker     Packs/day: 0 50     Quit date:      Years since quittin 2    Smokeless tobacco: Former User   Substance Use Topics    Alcohol use: Yes     Frequency: 2-4 times a month     Drinks per session: 1 or 2    Drug use: Never           Objective  /80   Ht 5' 4" (1 626 m)   Wt 94 3 kg (208 lb)   LMP  (LMP Unknown)   BMI 35 70 kg/m²      Physical Exam:  Physical Exam  Exam conducted with a chaperone present  Constitutional:       General: She is not in acute distress  Appearance: Normal appearance  She is well-developed  She is not ill-appearing, toxic-appearing or diaphoretic  HENT:      Head: Normocephalic and atraumatic  Eyes:      General: No scleral icterus  Right eye: No discharge  Left eye: No discharge  Conjunctiva/sclera: Conjunctivae normal    Cardiovascular:      Rate and Rhythm: Normal rate  Pulmonary:      Effort: Pulmonary effort is normal  No accessory muscle usage or respiratory distress  Chest:      Breasts:         Right: No inverted nipple, mass, nipple discharge, skin change or tenderness  Left: No inverted nipple, mass, nipple discharge, skin change or tenderness  Abdominal:      General: There is no distension  Palpations: Abdomen is soft  There is no mass        Tenderness: There is no abdominal tenderness  There is no guarding or rebound  Genitourinary:     General: Normal vulva  Exam position: Lithotomy position  Labia:         Right: No rash, tenderness or lesion  Left: No rash, tenderness or lesion  Vagina: No signs of injury  No vaginal discharge, erythema, tenderness or bleeding  Cervix: No cervical motion tenderness (iud strings visible), discharge, friability or lesion  Uterus: Not enlarged, not fixed and not tender  Adnexa:         Right: No mass, tenderness or fullness  Left: No mass, tenderness or fullness  Rectum: No external hemorrhoid  Normal anal tone  Comments: Urethral meatus: normal  Skin:     General: Skin is warm and dry  Coloration: Skin is not jaundiced  Findings: No bruising, erythema or rash  Neurological:      Mental Status: She is alert  Psychiatric:         Mood and Affect: Mood normal          Behavior: Behavior normal          Thought Content:  Thought content normal          Judgment: Judgment normal

## 2021-04-23 NOTE — PROGRESS NOTES
Pre-Test Genetic Counseling Consult Note    Patient Name: Roopa Prakash   /Age: 1985/35 y o  Referring Provider: Bam Florence MD, FACS    Date of Service: 2021  Genetic Counselor: León Perry MS, [facility]  Interpretation Services: None  [facility]      Renee was referred to the [address] [place] [address] and Genetic Assessment Program due to her family history of ovarian and breast cancer  She presents today to discuss the possibility of a hereditary cancer syndrome, options for genetic testing, and implications for her and her family       Cancer History and Treatment:     Personal History: No personal history of cancer    Screening Hx:     Breast:  2020 bilateral 3D diagnostic mammogram and left breast ultrasound revealed dense breast tissue, there was a complex cyst at the 0200 hours axis, there was also a cluster of cyst in the far upper outer left breast at 1-2 o'clock, normal left axillary node, no subareolar lesions; a six month follow-up ultrasound was recommended of the left breast      Breast Biopsy: None    Colon:  Colonoscopy: None     Gynecologic:  Pelvic/Pap exam Routine   Ovaries/Uterus: Intact    Skin:  Skin cancer screening: None     Reproductive History  Age at menarche: 9y  Parity:   Fertility Medication: None    Age at first live birth: 15y  : <6 months   Oral Contraception: 4 years   Menopause: NA  Hormone replacement: NA    Medical and Surgical History  Pertinent surgical history:   Past Surgical History:   Procedure Laterality Date    HERNIA REPAIR        Pertinent medical history:  Past Medical History:   Diagnosis Date    Anxiety     Asthma     Depression     GERD (gastroesophageal reflux disease)     Obesity     Scoliosis     Visual impairment        Other History:  Height:   Ht Readings from Last 1 Encounters:   21 5' 4" (1 626 m)     Weight:   Wt Readings from Last 1 Encounters: 04/06/21 94 3 kg (208 lb)     Relevant Family History   Patient reports no Ashkenazi Taoism ancestry  - Maternal Aunt: Ovarian cancer 52's  - Maternal Grandmother: Breast cancer age 46    Please refer to the scanned pedigree in the Media Tab for a complete family history     *All history is reported as provided by the patient; records are not available for review, except where indicated  Assessment:  We discussed sporadic, familial and hereditary cancer  We also discussed the many factors that influence our risk for cancer such as age, environmental exposures, lifestyle choices and family history  We reviewed the indications suggestive of a hereditary predisposition to cancer  Genetic testing is indicated for Renee based on the following criteria: Meets NCCN V2 2021 Testing Criteria for High-Penetrance Breast and/or Ovarian Cancer Susceptibility Genes based on her maternal aunt (second-degree relative) diagnosis of ovarian cancer along with her grandmother's diagnosis of breast cancer  The risks, benefits, and limitations of genetic testing were reviewed with the patient, as well as genetic discrimination laws, and possible test results (positive, negative, variants of uncertain significance) and their clinical implications  If positive for a mutation, options for managing cancer risk including increased surveillance, chemoprevention, and in some cases prophylactic surgery were discussed  Renee was informed that if a hereditary cancer syndrome was identified in her, first degree relatives (parents, siblings, and children) have a chance of also inheriting the condition  Genetic testing would allow for predictive genetic testing in other relatives, who may also be at risk depending on their degree of relation  Plan: Patient decided to proceed with testing and provided consent      Summary:     Sample Collection:  Blood was collected in the office on 4 28 21    Genetic Testing Preformed: CustomNext: Cancer (37 genes): APC, BLOSSOM, AXIN2 BARD1, BRCA1, BRCA2, BRIP1, BMPR1A, CDH1, CDK4, CDKN2A, CHEK2, DICER1, EPCAM, FH, GREM1, HOXB13, MLH1, MSH2, MSH3, MSH6, MUTYH, NBN, NF1, NTHL1, PALB2, PMS2, POLD1, POLE, PTEN, RAD51C, RAD51D, RECQL SMAD4, SMARCA4, STK11, TP53    CancerNext + FH gene due to fhx of uterine fibroids     Results take approximately 2-3 weeks to complete once test is started  We will contact [facility] once results are available  Additional recommendations for surveillance/medical management will be made pending genetic test results

## 2021-04-27 ENCOUNTER — TELEPHONE (OUTPATIENT)
Dept: SURGICAL ONCOLOGY | Facility: CLINIC | Age: 36
End: 2021-04-27

## 2021-04-28 ENCOUNTER — CLINICAL SUPPORT (OUTPATIENT)
Dept: GENETICS | Facility: CLINIC | Age: 36
End: 2021-04-28

## 2021-04-28 DIAGNOSIS — Z80.3 FAMILY HISTORY OF BREAST CANCER: ICD-10-CM

## 2021-04-28 DIAGNOSIS — Z80.41 FAMILY HISTORY OF OVARIAN CANCER: Primary | ICD-10-CM

## 2021-04-28 NOTE — LETTER
2021     Khadijah Osman MD  [address] [facility]  [address] [facility],[address]    Patient: Hans Herman  YOB: 1985  Date of Visit: 2021      Dear Dr Castillo Gone: Thank you for referring Hans Herman to me for evaluation  Below are my notes for this consultation  If you have questions, please do not hesitate to call me  I look forward to following your patient along with you  Sincerely,        Corrine Lazarus Edman, GC        CC: No Recipients        Pre-Test Genetic Counseling Consult Note    Patient Name: Hans Herman   /Age: 1985/35 y o  Referring Provider: Khadijah Osman MD, ALEXANDER    Date of Service: 2021  Genetic Counselor: Liberty Cordova MS, [facility]  Interpretation Services: None  [facility]      Renee was referred to the [address] [place] [address] and Genetic Assessment Program due to her family history of ovarian and breast cancer  She presents today to discuss the possibility of a hereditary cancer syndrome, options for genetic testing, and implications for her and her family       Cancer History and Treatment:     Personal History: No personal history of cancer    Screening Hx:     Breast:  2020 bilateral 3D diagnostic mammogram and left breast ultrasound revealed dense breast tissue, there was a complex cyst at the 0200 hours axis, there was also a cluster of cyst in the far upper outer left breast at 1-2 o'clock, normal left axillary node, no subareolar lesions; a six month follow-up ultrasound was recommended of the left breast      Breast Biopsy: None    Colon:  Colonoscopy: None     Gynecologic:  Pelvic/Pap exam Routine   Ovaries/Uterus: Intact    Skin:  Skin cancer screening: None     Reproductive History  Age at menarche: 9y  Parity:   Fertility Medication: None    Age at first live birth: 15y  : <6 months   Oral Contraception: 4 years   Menopause: NA  Hormone replacement: NA    Medical and Surgical History  Pertinent surgical history:   Past Surgical History:   Procedure Laterality Date    HERNIA REPAIR        Pertinent medical history:  Past Medical History:   Diagnosis Date    Anxiety     Asthma     Depression     GERD (gastroesophageal reflux disease)     Obesity     Scoliosis     Visual impairment        Other History:  Height:   Ht Readings from Last 1 Encounters:   04/06/21 5' 4" (1 626 m)     Weight:   Wt Readings from Last 1 Encounters:   04/06/21 94 3 kg (208 lb)     Relevant Family History   Patient reports no Ashkenazi Hinduism ancestry  - Maternal Aunt: Ovarian cancer 52's  - Maternal Grandmother: Breast cancer age 46    Please refer to the scanned pedigree in the Media Tab for a complete family history     *All history is reported as provided by the patient; records are not available for review, except where indicated  Assessment:  We discussed sporadic, familial and hereditary cancer  We also discussed the many factors that influence our risk for cancer such as age, environmental exposures, lifestyle choices and family history  We reviewed the indications suggestive of a hereditary predisposition to cancer  Genetic testing is indicated for Renee based on the following criteria: Meets NCCN V2 2021 Testing Criteria for High-Penetrance Breast and/or Ovarian Cancer Susceptibility Genes based on her maternal aunt (second-degree relative) diagnosis of ovarian cancer along with her grandmother's diagnosis of breast cancer  The risks, benefits, and limitations of genetic testing were reviewed with the patient, as well as genetic discrimination laws, and possible test results (positive, negative, variants of uncertain significance) and their clinical implications  If positive for a mutation, options for managing cancer risk including increased surveillance, chemoprevention, and in some cases prophylactic surgery were discussed   Renee was informed that if a hereditary cancer syndrome was identified in her, first degree relatives (parents, siblings, and children) have a chance of also inheriting the condition  Genetic testing would allow for predictive genetic testing in other relatives, who may also be at risk depending on their degree of relation  Plan: Patient decided to proceed with testing and provided consent  Summary:     Sample Collection:  Blood was collected in the office on 4 28 21    Genetic Testing Preformed: CustomNext: Cancer (37 genes): APC, BLOSSOM, AXIN2 BARD1, BRCA1, BRCA2, BRIP1, BMPR1A, CDH1, CDK4, CDKN2A, CHEK2, DICER1, EPCAM, FH, GREM1, HOXB13, MLH1, MSH2, MSH3, MSH6, MUTYH, NBN, NF1, NTHL1, PALB2, PMS2, POLD1, POLE, PTEN, RAD51C, RAD51D, RECQL SMAD4, SMARCA4, STK11, TP53    CancerNext + FH gene due to fhx of uterine fibroids     Results take approximately 2-3 weeks to complete once test is started  We will contact [facility] once results are available  Additional recommendations for surveillance/medical management will be made pending genetic test results

## 2021-05-03 ENCOUNTER — HOSPITAL ENCOUNTER (OUTPATIENT)
Dept: ULTRASOUND IMAGING | Facility: CLINIC | Age: 36
Discharge: HOME | End: 2021-05-03
Payer: COMMERCIAL

## 2021-05-03 VITALS — WEIGHT: 208 LBS | BODY MASS INDEX: 40.84 KG/M2 | HEIGHT: 60 IN

## 2021-05-03 DIAGNOSIS — R92.8 ABNORMAL FINDING ON BREAST IMAGING: ICD-10-CM

## 2021-05-03 PROCEDURE — 76642 ULTRASOUND BREAST LIMITED: CPT

## 2021-05-06 ENCOUNTER — TELEPHONE (OUTPATIENT)
Dept: GENETICS | Facility: CLINIC | Age: 36
End: 2021-05-06

## 2021-05-06 NOTE — TELEPHONE ENCOUNTER
Post-Test Genetic Counseling Consult Note  Today I spoke with Renee over the phone to review the results of her negative  genetic test for hereditary cancer  We met previously on 4/28/2021 for pre-test counseling  SUMMARY:    Test(s): [employer]: Cancer (37 genes): APC, BLOSSOM, AXIN2 BARD1, BRCA1, BRCA2, BRIP1, BMPR1A, CDH1, CDK4, CDKN2A, CHEK2, DICER1, EPCAM, FH, GREM1, HOXB13, MLH1, MSH2, MSH3, MSH6, MUTYH, NBN, NF1, NTHL1, PALB2, PMS2, POLD1, POLE, PTEN, RAD51C, RAD51D, RECQL SMAD4, SMARCA4, STK11, TP53    Result: Negative - No Clinically Significant Variants Detected      Assessment:   A negative result significantly reduces the likelihood that Renee has a hereditary cancer syndrome  However, this testing is unable to completely rule out the presence of hereditary cancer  It remains possible that:  - There is a variant in an area of a gene which was not tested or there is a variant not detectable due to technical limitations of this test      - There is a variant in another gene that was not included in this test or in a gene not known to be linked to cancer or tumors  - A family member has a genetic variant that the patient did not inherit  - The cancer in the family is sporadic and is related to non-hereditary factors  Risks and Testing for Family Members: At this time we do not recommend testing for ADI GALLARDO 's children based on her negative test result  [place]I [place] children still need to consider the history of cancer on the other side of their family when determining their risks  If ADI GALLARDO has any affected family members with a cancer diagnosis, especially at a young age, they may still consider genetic testing  Relatives who wish to pursue genetic testing can reach out to the [facility] [address] ([id number]) to schedule an appointment or visit www [facility] org to identify a local genetic counselor        Additional Information:  A healthy lifestyle will improve overall health and reduce risk for illness  Eating a healthy diet and exercising for 4 hours per week is recommended  Both diet and exercise have been shown to help maintain a healthy weight  Postmenopausal women who are overweight are at higher risk for breast cancer  Moderate to heavy alcohol use can increase the risk for some cancers  Smoking cigarettes can also increase risk for breast, lung, prostate, pancreatic and other cancers  Plan:   There are no additional recommendations based on Renee's negative result  she should continue cancer screening and medical management as clinically indicated and as determined appropriate by her healthcare providers  Negative Result: Deny Farias was strongly encouraged to contact us regarding any changes in her personal or family history of cancer as these changes could alter our recommendation regarding genetic testing and/or cancer screening

## 2021-05-10 ENCOUNTER — TELEPHONE (OUTPATIENT)
Dept: GENETICS | Facility: CLINIC | Age: 36
End: 2021-05-10

## 2021-05-15 DIAGNOSIS — M79.10 MYALGIA: ICD-10-CM

## 2021-05-15 DIAGNOSIS — G89.29 CHRONIC BILATERAL LOW BACK PAIN WITHOUT SCIATICA: ICD-10-CM

## 2021-05-15 DIAGNOSIS — M54.50 CHRONIC BILATERAL LOW BACK PAIN WITHOUT SCIATICA: ICD-10-CM

## 2021-05-16 RX ORDER — DICLOFENAC SODIUM 75 MG/1
TABLET, DELAYED RELEASE ORAL
Qty: 60 TABLET | Refills: 0 | Status: SHIPPED | OUTPATIENT
Start: 2021-05-16 | End: 2021-11-04 | Stop reason: ALTCHOICE

## 2021-06-11 DIAGNOSIS — F32.0 CURRENT MILD EPISODE OF MAJOR DEPRESSIVE DISORDER, UNSPECIFIED WHETHER RECURRENT (HCC): ICD-10-CM

## 2021-06-18 ENCOUNTER — HOSPITAL ENCOUNTER (OUTPATIENT)
Dept: NON INVASIVE DIAGNOSTICS | Facility: HOSPITAL | Age: 36
Discharge: HOME | End: 2021-06-18
Payer: COMMERCIAL

## 2021-06-18 ENCOUNTER — OFFICE VISIT (OUTPATIENT)
Dept: FAMILY MEDICINE CLINIC | Facility: CLINIC | Age: 36
End: 2021-06-18
Payer: COMMERCIAL

## 2021-06-18 VITALS
HEIGHT: 64 IN | TEMPERATURE: 97.9 F | WEIGHT: 216 LBS | BODY MASS INDEX: 36.88 KG/M2 | SYSTOLIC BLOOD PRESSURE: 122 MMHG | DIASTOLIC BLOOD PRESSURE: 78 MMHG

## 2021-06-18 DIAGNOSIS — K21.9 GASTROESOPHAGEAL REFLUX DISEASE WITHOUT ESOPHAGITIS: ICD-10-CM

## 2021-06-18 PROCEDURE — 3008F BODY MASS INDEX DOCD: CPT | Performed by: FAMILY MEDICINE

## 2021-06-18 PROCEDURE — 1036F TOBACCO NON-USER: CPT | Performed by: FAMILY MEDICINE

## 2021-06-18 PROCEDURE — 93971 EXTREMITY STUDY: CPT | Performed by: SURGERY

## 2021-06-18 PROCEDURE — 99213 OFFICE O/P EST LOW 20 MIN: CPT | Performed by: FAMILY MEDICINE

## 2021-06-18 PROCEDURE — 93970 EXTREMITY STUDY: CPT

## 2021-06-18 RX ORDER — OMEPRAZOLE 20 MG/1
20 CAPSULE, DELAYED RELEASE ORAL AS NEEDED
Qty: 90 CAPSULE | Refills: 1 | Status: SHIPPED | OUTPATIENT
Start: 2021-06-18 | End: 2021-07-28 | Stop reason: SDUPTHER

## 2021-06-18 NOTE — PROGRESS NOTES
Assessment/Plan:  Patient go for Doppler studies of bilateral lower extremities  Patient will continue with diclofenac as directed  Patient will try to elevate legs appropriately  Diagnoses and all orders for this visit:    Gastroesophageal reflux disease without esophagitis  -     omeprazole (PriLOSEC) 20 mg delayed release capsule; Take 1 capsule (20 mg total) by mouth as needed (as needed)  -     VAS lower limb venous duplex study, complete bilateral; Future            Subjective:        Patient ID: Fara Ferris is a 30 y o  female  Patient is here with some swelling of left lower extremity greater than right along with some shock-like sensations bilateral lower extremities left greater than right over the past week  Patient did take a trip to [place] last week  Patient developed some left leg pain and swelling after getting down there the next day  Patient did go by or travel  Patient does have some mild back pain but no change in urination or defecation fevers or chills or saddle anesthesia  No trauma noted  No redness noted  Patient did have pain in the calf on the left  No Motrin Tylenol or other NSAIDs use  The following portions of the patient's history were reviewed and updated as appropriate: allergies, current medications, past family history, past medical history, past social history, past surgical history and problem list       Review of Systems   Constitutional: Negative  HENT: Negative  Eyes: Negative  Respiratory: Negative  Cardiovascular: Negative  Gastrointestinal: Negative  Endocrine: Negative  Genitourinary: Negative  Musculoskeletal: Positive for arthralgias, gait problem and myalgias  Skin: Negative  Allergic/Immunologic: Negative  Hematological: Negative  Psychiatric/Behavioral: Negative              Objective:               /78 (BP Location: Right arm, Patient Position: Sitting, Cuff Size: Large)   Temp 97 9 °F (36 6 °C) (Tympanic)   Ht 5' 4" (1 626 m)   Wt 98 kg (216 lb)   BMI 37 08 kg/m²          Physical Exam  Vitals and nursing note reviewed  Constitutional:       General: She is not in acute distress  Appearance: Normal appearance  She is not ill-appearing, toxic-appearing or diaphoretic  Cardiovascular:      Rate and Rhythm: Normal rate and regular rhythm  Pulses: Normal pulses  Heart sounds: Normal heart sounds  Pulmonary:      Effort: Pulmonary effort is normal       Breath sounds: Normal breath sounds  Musculoskeletal:         General: Tenderness present  Right lower leg: No edema  Left lower leg: No edema  Comments: The pain with palpation knee thighs bilaterally  No pain lumbar region with palpation  Some discomfort left calf   Neurological:      Mental Status: She is alert  Psychiatric:         Mood and Affect: Mood normal          Behavior: Behavior normal          Thought Content:  Thought content normal

## 2021-06-21 ENCOUNTER — TELEPHONE (OUTPATIENT)
Dept: FAMILY MEDICINE CLINIC | Facility: CLINIC | Age: 36
End: 2021-06-21

## 2021-06-21 DIAGNOSIS — F32.0 CURRENT MILD EPISODE OF MAJOR DEPRESSIVE DISORDER, UNSPECIFIED WHETHER RECURRENT (HCC): Primary | ICD-10-CM

## 2021-06-21 RX ORDER — TOPIRAMATE 25 MG/1
25 CAPSULE, COATED PELLETS ORAL 2 TIMES DAILY
COMMUNITY
End: 2021-06-21 | Stop reason: SDUPTHER

## 2021-06-21 RX ORDER — TOPIRAMATE 25 MG/1
25 CAPSULE, COATED PELLETS ORAL 2 TIMES DAILY
Qty: 60 CAPSULE | Refills: 2 | Status: SHIPPED | OUTPATIENT
Start: 2021-06-21 | End: 2021-09-13

## 2021-06-21 NOTE — TELEPHONE ENCOUNTER
----- Message from Kecia Aldana DO sent at 6/19/2021  3:33 PM EDT -----   Call patient    Doppler study bilateral lower extremities normal

## 2021-06-21 NOTE — TELEPHONE ENCOUNTER
Pt called in and said per your last visit with her, after viewing her test results, you will send an order for topamax  Please verify

## 2021-07-12 ENCOUNTER — RA CDI HCC (OUTPATIENT)
Dept: OTHER | Facility: HOSPITAL | Age: 36
End: 2021-07-12

## 2021-07-12 PROBLEM — E66.01 SEVERE OBESITY WITH BODY MASS INDEX (BMI) OF 35.0 TO 39.9 WITH COMORBIDITY (HCC): Status: ACTIVE | Noted: 2020-07-23

## 2021-07-12 NOTE — PROGRESS NOTES
[place] [id number]  coding opportunities             Chart reviewed, (number of) suggestions sent to provider: 2     Problem listed updated  Provider Accepted, (number of) suggestions accepted: 2               Patients insurance company: [employer] (Med[employer]are [employer]vantage and [employer])     Visit status: Patient canceled the appointment        [place] [address]  coding opportunities             Chart reviewed, (number of) suggestions sent to provider: 2     Problem listed updated  Provider Accepted, (number of) suggestions accepted: 2               Patients insurance company: [employer] (Medicare [employer]vantage and [employer])           [place] [address]  coding opportunities             Chart reviewed, (number of) suggestions sent to provider: 2                  Patients insurance company: [employer] (Medicare Advantage and [employer])           1)   BMI>35   E66 01- Morbid (severe) obesity due to excess calories ([place] [address] )    Z68  35- Z68 --  - Body mass index (BMI), adult  (Pick one from the Z68 35 - H34 --)           Per CMS/ICD 10 coding guidelines, to be used when BMI > 35 & <40 with one or more comorbidity (GERD)      2) Found in office visit note dated 3/18/21  F32 0 Current mild episode of major depressive disorder, unspecified whether recurrent ([place] [address] )

## 2021-07-28 ENCOUNTER — OFFICE VISIT (OUTPATIENT)
Dept: FAMILY MEDICINE CLINIC | Facility: CLINIC | Age: 36
End: 2021-07-28
Payer: COMMERCIAL

## 2021-07-28 VITALS
TEMPERATURE: 98.1 F | HEIGHT: 64 IN | DIASTOLIC BLOOD PRESSURE: 66 MMHG | WEIGHT: 220 LBS | BODY MASS INDEX: 37.56 KG/M2 | SYSTOLIC BLOOD PRESSURE: 118 MMHG

## 2021-07-28 DIAGNOSIS — K21.9 GASTROESOPHAGEAL REFLUX DISEASE WITHOUT ESOPHAGITIS: ICD-10-CM

## 2021-07-28 DIAGNOSIS — H81.10 BENIGN PAROXYSMAL POSITIONAL VERTIGO, UNSPECIFIED LATERALITY: ICD-10-CM

## 2021-07-28 DIAGNOSIS — E66.01 SEVERE OBESITY WITH BODY MASS INDEX (BMI) OF 35.0 TO 39.9 WITH COMORBIDITY (HCC): ICD-10-CM

## 2021-07-28 DIAGNOSIS — M79.642 BILATERAL HAND PAIN: ICD-10-CM

## 2021-07-28 DIAGNOSIS — J45.30 MILD PERSISTENT ASTHMA WITHOUT COMPLICATION: Primary | ICD-10-CM

## 2021-07-28 DIAGNOSIS — M79.641 BILATERAL HAND PAIN: ICD-10-CM

## 2021-07-28 PROCEDURE — 1036F TOBACCO NON-USER: CPT | Performed by: FAMILY MEDICINE

## 2021-07-28 PROCEDURE — 99214 OFFICE O/P EST MOD 30 MIN: CPT | Performed by: FAMILY MEDICINE

## 2021-07-28 PROCEDURE — 3008F BODY MASS INDEX DOCD: CPT | Performed by: FAMILY MEDICINE

## 2021-07-28 PROCEDURE — 3725F SCREEN DEPRESSION PERFORMED: CPT | Performed by: FAMILY MEDICINE

## 2021-07-28 RX ORDER — MECLIZINE HYDROCHLORIDE 25 MG/1
25 TABLET ORAL 3 TIMES DAILY PRN
Qty: 30 TABLET | Refills: 2 | Status: SHIPPED | OUTPATIENT
Start: 2021-07-28

## 2021-07-28 RX ORDER — OMEPRAZOLE 40 MG/1
40 CAPSULE, DELAYED RELEASE ORAL DAILY
Qty: 90 CAPSULE | Refills: 1 | Status: SHIPPED | OUTPATIENT
Start: 2021-07-28

## 2021-07-28 NOTE — PROGRESS NOTES
Assessment/Plan:  Labs and records reviewed  Patient will have omeprazole increased to 40 mg daily  Asthma stable at this time  Continue with current regimen  Patient see Rheumatology regarding bilateral hand pain/arthritic pain  Vertigo stable  Continue with meclizine as needed the refills given  Patient with new job  Patient under less stress  Will recheck weight at follow-up patient will try to have low calorie diet  Patient will follow-up in 4 months or as needed       Diagnoses and all orders for this visit:    Mild persistent asthma without complication    Benign paroxysmal positional vertigo, unspecified laterality  -     meclizine (ANTIVERT) 25 mg tablet; Take 1 tablet (25 mg total) by mouth 3 (three) times a day as needed for dizziness    Severe obesity with body mass index (BMI) of 35 0 to 39 9 with comorbidity (HCC)    Gastroesophageal reflux disease without esophagitis  -     omeprazole (PriLOSEC) 40 MG capsule; Take 1 capsule (40 mg total) by mouth daily    Bilateral hand pain  -     Ambulatory referral to Rheumatology; Future            Subjective:        Patient ID: Shi Lott is a 30 y o  female  Patient follow-up on asthma, benign position vertigo as well as obesity and hand pain bilaterally  Patient has a new job at this time  Patient feels overall jobs causing or significant amount of stress which ultimately lead to overeating  Finger pain and hand pain bilaterally has been getting worse over the past few months  No significant vertigo at this time  Asthma has been relatively stable  With rare albuterol use  Patient did use albuterol 1 time since last being seen  No chest pain  Problems urinating or defecating  Labs and records reviewed  Patient also here to follow-up on GERD  Patient has noticed some reflux symptoms and wishes increase omeprazole          The following portions of the patient's history were reviewed and updated as appropriate: allergies, current medications, past family history, past medical history, past social history, past surgical history and problem list       Review of Systems   Constitutional: Positive for unexpected weight change  HENT: Negative  Eyes: Negative  Respiratory: Negative  Cardiovascular: Negative  Gastrointestinal: Negative  GERD   Endocrine: Negative  Genitourinary: Negative  Musculoskeletal: Positive for arthralgias  Skin: Negative  Allergic/Immunologic: Negative  Neurological: Negative  Hematological: Negative  Psychiatric/Behavioral: Negative  Objective:      BMI Counseling: Body mass index is 37 76 kg/m²  The BMI is above normal  Nutrition recommendations include decreasing portion sizes  Exercise recommendations include moderate physical activity 150 minutes/week  /66 (BP Location: Right arm, Patient Position: Sitting, Cuff Size: Large)   Temp 98 1 °F (36 7 °C) (Tympanic)   Ht 5' 4" (1 626 m)   Wt 99 8 kg (220 lb)   BMI 37 76 kg/m²          Physical Exam  Vitals and nursing note reviewed  Constitutional:       General: She is not in acute distress  Appearance: Normal appearance  She is not ill-appearing, toxic-appearing or diaphoretic  HENT:      Head: Normocephalic and atraumatic  Right Ear: Tympanic membrane, ear canal and external ear normal  There is no impacted cerumen  Left Ear: Tympanic membrane, ear canal and external ear normal  There is no impacted cerumen  Nose: Nose normal  No congestion or rhinorrhea  Mouth/Throat:      Mouth: Mucous membranes are moist       Pharynx: No oropharyngeal exudate or posterior oropharyngeal erythema  Eyes:      General: No scleral icterus  Right eye: No discharge  Left eye: No discharge  Extraocular Movements: Extraocular movements intact  Conjunctiva/sclera: Conjunctivae normal       Pupils: Pupils are equal, round, and reactive to light     Neck: Vascular: No carotid bruit  Cardiovascular:      Rate and Rhythm: Normal rate and regular rhythm  Pulses: Normal pulses  Heart sounds: Normal heart sounds  No murmur heard  No friction rub  No gallop  Pulmonary:      Effort: Pulmonary effort is normal  No respiratory distress  Breath sounds: Normal breath sounds  No stridor  No wheezing, rhonchi or rales  Chest:      Chest wall: No tenderness  Abdominal:      General: Abdomen is flat  Bowel sounds are normal  There is no distension  Palpations: Abdomen is soft  Tenderness: There is no abdominal tenderness  There is no guarding or rebound  Musculoskeletal:         General: No swelling, tenderness, deformity or signs of injury  Normal range of motion  Cervical back: Normal range of motion and neck supple  No rigidity  No muscular tenderness  Right lower leg: No edema  Left lower leg: No edema  Lymphadenopathy:      Cervical: No cervical adenopathy  Skin:     General: Skin is warm and dry  Capillary Refill: Capillary refill takes less than 2 seconds  Coloration: Skin is not jaundiced  Findings: No bruising, erythema, lesion or rash  Neurological:      General: No focal deficit present  Mental Status: She is alert and oriented to person, place, and time  Cranial Nerves: No cranial nerve deficit  Sensory: No sensory deficit  Motor: No weakness  Coordination: Coordination normal       Gait: Gait normal    Psychiatric:         Mood and Affect: Mood normal          Behavior: Behavior normal          Thought Content:  Thought content normal          Judgment: Judgment normal

## 2021-07-30 DIAGNOSIS — E66.01 SEVERE OBESITY WITH BODY MASS INDEX (BMI) OF 35.0 TO 39.9 WITH COMORBIDITY (HCC): Primary | ICD-10-CM

## 2021-07-30 NOTE — TELEPHONE ENCOUNTER
Dr Katarzyna Franklin, pt has decided that she would like to start fentermine per your discussion  Is this ok?

## 2021-08-03 RX ORDER — PHENTERMINE HYDROCHLORIDE 37.5 MG/1
37.5 CAPSULE ORAL EVERY MORNING
Qty: 30 CAPSULE | Refills: 0 | Status: SHIPPED | OUTPATIENT
Start: 2021-08-03 | End: 2021-11-15

## 2021-08-20 ENCOUNTER — TELEPHONE (OUTPATIENT)
Dept: OBGYN CLINIC | Facility: MEDICAL CENTER | Age: 36
End: 2021-08-20

## 2021-08-20 NOTE — TELEPHONE ENCOUNTER
Pt  Requesting IUD removal, provided CPT code for insurance conformation, sent to e-mail on file  Advised to record representative's name and reference number to be provided for appt  Stated will call back to make appt

## 2021-09-01 ENCOUNTER — OFFICE VISIT (OUTPATIENT)
Dept: URGENT CARE | Age: 36
End: 2021-09-01
Payer: COMMERCIAL

## 2021-09-01 VITALS — RESPIRATION RATE: 18 BRPM | HEART RATE: 107 BPM | TEMPERATURE: 97.3 F | OXYGEN SATURATION: 97 %

## 2021-09-01 DIAGNOSIS — R19.7 DIARRHEA, UNSPECIFIED TYPE: ICD-10-CM

## 2021-09-01 DIAGNOSIS — Z20.822 CONTACT WITH AND (SUSPECTED) EXPOSURE TO COVID-19: Primary | ICD-10-CM

## 2021-09-01 LAB — SARS-COV-2 RNA RESP QL NAA+PROBE: NEGATIVE

## 2021-09-01 PROCEDURE — U0005 INFEC AGEN DETEC AMPLI PROBE: HCPCS | Performed by: NURSE PRACTITIONER

## 2021-09-01 PROCEDURE — G0382 LEV 3 HOSP TYPE B ED VISIT: HCPCS | Performed by: NURSE PRACTITIONER

## 2021-09-01 PROCEDURE — U0003 INFECTIOUS AGENT DETECTION BY NUCLEIC ACID (DNA OR RNA); SEVERE ACUTE RESPIRATORY SYNDROME CORONAVIRUS 2 (SARS-COV-2) (CORONAVIRUS DISEASE [COVID-19]), AMPLIFIED PROBE TECHNIQUE, MAKING USE OF HIGH THROUGHPUT TECHNOLOGIES AS DESCRIBED BY CMS-2020-01-R: HCPCS | Performed by: NURSE PRACTITIONER

## 2021-09-01 NOTE — PROGRESS NOTES
[facility]        NAME: Brian Mosley is a 30 y o  female  : 1985    MRN: [id number]  DATE: 2021  TIME: 9:01 AM    Assessment and Plan   Contact with and (suspected) exposure to covid-19 [Z20 822]  1  Contact with and (suspected) exposure to covid-19  Novel Coronavirus (Covid-19),PCR [facility] - Office Collection   2  Diarrhea, unspecified type           Patient Instructions     Covid tested; results in 2-3 days  Stay quarantined  Follow up with PCP in 3-5 days  Proceed to  ER if symptoms worsen  Chief Complaint     Chief Complaint   Patient presents with    Headache     symptoms x 3 days    Shortness of Breath    Diarrhea         History of Present Illness       HPI   Reports HA, diarrhea and mild SOB  Says she was exposed to someone with covid  Ongoing symptoms x 3 days  Requesting testing for covid  Review of Systems   Review of Systems   Constitutional: Negative for fatigue  HENT: Negative for sore throat and trouble swallowing  Respiratory: Positive for shortness of breath  Negative for chest tightness and wheezing  Cardiovascular: Negative for chest pain  Gastrointestinal: Positive for diarrhea  Negative for abdominal pain, nausea and vomiting  Neurological: Positive for headaches           Current Medications       Current Outpatient Medications:     albuterol (2 5 mg/3 mL) 0 083 % nebulizer solution, Take 1 vial (2 5 mg total) by nebulization every 6 (six) hours as needed for wheezing or shortness of breath, Disp: 25 vial, Rfl: 5    albuterol (PROVENTIL HFA,VENTOLIN HFA) 90 mcg/act inhaler, Inhale 2 puffs every 4 (four) hours as needed (as needed), Disp: 1 Inhaler, Rfl: 2    budesonide-formoterol (SYMBICORT) 160-4 5 mcg/act inhaler, Inhale 2 puffs 2 (two) times a day Rinse mouth after use , Disp: 1 Inhaler, Rfl: 5    cyclobenzaprine (FLEXERIL) 5 mg tablet, Take 1 tablet (5 mg total) by mouth daily at bedtime, Disp: 30 tablet, Rfl: 2    diclofenac (VOLTAREN) 75 mg EC tablet, TAKE 1 TABLET BY MOUTH TWICE A DAY, Disp: 60 tablet, Rfl: 0    fexofenadine-pseudoephedrine (ALLEGRA-D 24) 180-240 MG per 24 hr tablet, Take 1 tablet by mouth daily, Disp: 90 tablet, Rfl: 1    Levonorgestrel (LILETTA) 19 5 MCG/DAY IUD IUD, 1 each by Intrauterine route, Disp: , Rfl:     meclizine (ANTIVERT) 25 mg tablet, Take 1 tablet (25 mg total) by mouth 3 (three) times a day as needed for dizziness, Disp: 30 tablet, Rfl: 2    omeprazole (PriLOSEC) 40 MG capsule, Take 1 capsule (40 mg total) by mouth daily, Disp: 90 capsule, Rfl: 1    phentermine 37 5 MG capsule, Take 1 capsule (37 5 mg total) by mouth every morning, Disp: 30 capsule, Rfl: 0    topiramate (TOPAMAX) 25 mg sprinkle capsule, Take 1 capsule (25 mg total) by mouth 2 (two) times a day, Disp: 60 capsule, Rfl: 2    Current Allergies     Allergies as of 09/01/2021    (No Known Allergies)            The following portions of the patient's history were reviewed and updated as appropriate: allergies, current medications, past family history, past medical history, past social history, past surgical history and problem list      Past Medical History:   Diagnosis Date    Anxiety     Asthma     Depression     GERD (gastroesophageal reflux disease)     Obesity     Scoliosis     Visual impairment        Past Surgical History:   Procedure Laterality Date    HERNIA REPAIR         Family History   Problem Relation Age of Onset    Thyroid disease Mother     Arthritis Mother     Diabetes Mother     Depression Father     Arthritis Father     No Known Problems Sister     No Known Problems Brother     No Known Problems Son     Coronary artery disease Maternal Grandmother     Hyperlipidemia Maternal Grandmother     Thyroid disease Maternal Grandmother     Asthma Maternal Grandmother     Arthritis Maternal Grandmother     Vision loss Maternal Grandmother     Breast cancer Maternal Grandmother         age unknown    Hyperlipidemia Maternal Grandfather     Thyroid disease Maternal Grandfather     Diabetes Maternal Grandfather     Ovarian cancer Maternal Aunt         46s    Diabetes Maternal Aunt     Diabetes Maternal Uncle     No Known Problems Daughter     No Known Problems Paternal Grandmother     No Known Problems Paternal Grandfather     Colon cancer Maternal Aunt     No Known Problems Paternal Aunt     No Known Problems Paternal Aunt     No Known Problems Paternal Aunt     No Known Problems Paternal Aunt     No Known Problems Paternal Aunt     No Known Problems Paternal Aunt          Medications have been verified  Objective   Pulse (!) 107   Temp (!) 97 3 °F (36 3 °C)   Resp 18   LMP 08/04/2021   SpO2 97%   Patient's last menstrual period was 08/04/2021  Physical Exam     Physical Exam  Constitutional:       Appearance: She is not ill-appearing or diaphoretic  Cardiovascular:      Rate and Rhythm: Regular rhythm  Pulmonary:      Effort: Pulmonary effort is normal       Breath sounds: Normal breath sounds  No decreased breath sounds or wheezing  Neurological:      Mental Status: She is alert

## 2021-10-28 ENCOUNTER — TELEPHONE (OUTPATIENT)
Dept: FAMILY MEDICINE CLINIC | Facility: CLINIC | Age: 36
End: 2021-10-28

## 2021-11-02 DIAGNOSIS — R92.8 ABNORMAL MAMMOGRAM OF LEFT BREAST: Primary | ICD-10-CM

## 2021-11-03 ENCOUNTER — HOSPITAL ENCOUNTER (OUTPATIENT)
Dept: MAMMOGRAPHY | Facility: CLINIC | Age: 36
Discharge: HOME | End: 2021-11-03
Payer: COMMERCIAL

## 2021-11-03 ENCOUNTER — DOCUMENTATION (OUTPATIENT)
Dept: OBGYN CLINIC | Facility: CLINIC | Age: 36
End: 2021-11-03

## 2021-11-03 ENCOUNTER — HOSPITAL ENCOUNTER (OUTPATIENT)
Dept: ULTRASOUND IMAGING | Facility: CLINIC | Age: 36
Discharge: HOME | End: 2021-11-03
Payer: COMMERCIAL

## 2021-11-03 VITALS — BODY MASS INDEX: 37.56 KG/M2 | HEIGHT: 64 IN | WEIGHT: 220 LBS

## 2021-11-03 DIAGNOSIS — R92.8 ABNORMAL MAMMOGRAM OF LEFT BREAST: ICD-10-CM

## 2021-11-03 PROCEDURE — 76642 ULTRASOUND BREAST LIMITED: CPT

## 2021-11-03 PROCEDURE — G0279 TOMOSYNTHESIS, MAMMO: HCPCS

## 2021-11-03 PROCEDURE — 77065 DX MAMMO INCL CAD UNI: CPT

## 2021-11-04 ENCOUNTER — APPOINTMENT (OUTPATIENT)
Dept: LAB | Facility: MEDICAL CENTER | Age: 36
End: 2021-11-04
Payer: COMMERCIAL

## 2021-11-04 ENCOUNTER — APPOINTMENT (OUTPATIENT)
Dept: RADIOLOGY | Facility: MEDICAL CENTER | Age: 36
End: 2021-11-04
Payer: COMMERCIAL

## 2021-11-04 ENCOUNTER — OFFICE VISIT (OUTPATIENT)
Dept: RHEUMATOLOGY | Facility: CLINIC | Age: 36
End: 2021-11-04
Payer: COMMERCIAL

## 2021-11-04 VITALS
HEIGHT: 64 IN | SYSTOLIC BLOOD PRESSURE: 116 MMHG | BODY MASS INDEX: 37.56 KG/M2 | WEIGHT: 220 LBS | DIASTOLIC BLOOD PRESSURE: 70 MMHG

## 2021-11-04 DIAGNOSIS — M79.10 MYALGIA: ICD-10-CM

## 2021-11-04 DIAGNOSIS — F51.01 PRIMARY INSOMNIA: ICD-10-CM

## 2021-11-04 DIAGNOSIS — M79.641 BILATERAL HAND PAIN: ICD-10-CM

## 2021-11-04 DIAGNOSIS — M79.642 BILATERAL HAND PAIN: ICD-10-CM

## 2021-11-04 DIAGNOSIS — M25.561 CHRONIC ARTHRALGIAS OF KNEES AND HIPS: ICD-10-CM

## 2021-11-04 DIAGNOSIS — M25.552 CHRONIC ARTHRALGIAS OF KNEES AND HIPS: ICD-10-CM

## 2021-11-04 DIAGNOSIS — M25.562 CHRONIC ARTHRALGIAS OF KNEES AND HIPS: ICD-10-CM

## 2021-11-04 DIAGNOSIS — M25.551 CHRONIC ARTHRALGIAS OF KNEES AND HIPS: ICD-10-CM

## 2021-11-04 DIAGNOSIS — M19.90 INFLAMMATORY ARTHRITIS: Primary | ICD-10-CM

## 2021-11-04 DIAGNOSIS — G89.29 CHRONIC ARTHRALGIAS OF KNEES AND HIPS: ICD-10-CM

## 2021-11-04 LAB
25(OH)D3 SERPL-MCNC: 12.9 NG/ML (ref 30–100)
ALBUMIN SERPL BCP-MCNC: 3.5 G/DL (ref 3.5–5)
ALP SERPL-CCNC: 93 U/L (ref 46–116)
ALT SERPL W P-5'-P-CCNC: 24 U/L (ref 12–78)
ANION GAP SERPL CALCULATED.3IONS-SCNC: 2 MMOL/L (ref 4–13)
AST SERPL W P-5'-P-CCNC: 10 U/L (ref 5–45)
BASOPHILS # BLD AUTO: 0.02 THOUSANDS/ΜL (ref 0–0.1)
BASOPHILS NFR BLD AUTO: 0 % (ref 0–1)
BILIRUB SERPL-MCNC: 0.37 MG/DL (ref 0.2–1)
BUN SERPL-MCNC: 11 MG/DL (ref 5–25)
CALCIUM SERPL-MCNC: 8.9 MG/DL (ref 8.3–10.1)
CHLORIDE SERPL-SCNC: 109 MMOL/L (ref 100–108)
CO2 SERPL-SCNC: 26 MMOL/L (ref 21–32)
CREAT SERPL-MCNC: 0.67 MG/DL (ref 0.6–1.3)
CRP SERPL QL: 15.4 MG/L
EOSINOPHIL # BLD AUTO: 0.13 THOUSAND/ΜL (ref 0–0.61)
EOSINOPHIL NFR BLD AUTO: 2 % (ref 0–6)
ERYTHROCYTE [DISTWIDTH] IN BLOOD BY AUTOMATED COUNT: 14.7 % (ref 11.6–15.1)
ERYTHROCYTE [SEDIMENTATION RATE] IN BLOOD: 51 MM/HOUR (ref 0–19)
GFR SERPL CREATININE-BSD FRML MDRD: 133 ML/MIN/1.73SQ M
GLUCOSE P FAST SERPL-MCNC: 104 MG/DL (ref 65–99)
HCT VFR BLD AUTO: 41.1 % (ref 34.8–46.1)
HGB BLD-MCNC: 12.7 G/DL (ref 11.5–15.4)
IMM GRANULOCYTES # BLD AUTO: 0.02 THOUSAND/UL (ref 0–0.2)
IMM GRANULOCYTES NFR BLD AUTO: 0 % (ref 0–2)
LYMPHOCYTES # BLD AUTO: 2.03 THOUSANDS/ΜL (ref 0.6–4.47)
LYMPHOCYTES NFR BLD AUTO: 28 % (ref 14–44)
MCH RBC QN AUTO: 28.7 PG (ref 26.8–34.3)
MCHC RBC AUTO-ENTMCNC: 30.9 G/DL (ref 31.4–37.4)
MCV RBC AUTO: 93 FL (ref 82–98)
MONOCYTES # BLD AUTO: 0.43 THOUSAND/ΜL (ref 0.17–1.22)
MONOCYTES NFR BLD AUTO: 6 % (ref 4–12)
NEUTROPHILS # BLD AUTO: 4.6 THOUSANDS/ΜL (ref 1.85–7.62)
NEUTS SEG NFR BLD AUTO: 64 % (ref 43–75)
NRBC BLD AUTO-RTO: 0 /100 WBCS
PLATELET # BLD AUTO: 360 THOUSANDS/UL (ref 149–390)
PMV BLD AUTO: 9.7 FL (ref 8.9–12.7)
POTASSIUM SERPL-SCNC: 4.1 MMOL/L (ref 3.5–5.3)
PROT SERPL-MCNC: 7.6 G/DL (ref 6.4–8.2)
RBC # BLD AUTO: 4.43 MILLION/UL (ref 3.81–5.12)
SODIUM SERPL-SCNC: 137 MMOL/L (ref 136–145)
WBC # BLD AUTO: 7.23 THOUSAND/UL (ref 4.31–10.16)

## 2021-11-04 PROCEDURE — 36415 COLL VENOUS BLD VENIPUNCTURE: CPT | Performed by: INTERNAL MEDICINE

## 2021-11-04 PROCEDURE — 99244 OFF/OP CNSLTJ NEW/EST MOD 40: CPT | Performed by: INTERNAL MEDICINE

## 2021-11-04 PROCEDURE — 85025 COMPLETE CBC W/AUTO DIFF WBC: CPT | Performed by: INTERNAL MEDICINE

## 2021-11-04 PROCEDURE — 73130 X-RAY EXAM OF HAND: CPT

## 2021-11-04 PROCEDURE — 82306 VITAMIN D 25 HYDROXY: CPT | Performed by: INTERNAL MEDICINE

## 2021-11-04 PROCEDURE — 80053 COMPREHEN METABOLIC PANEL: CPT | Performed by: INTERNAL MEDICINE

## 2021-11-04 PROCEDURE — 85652 RBC SED RATE AUTOMATED: CPT | Performed by: INTERNAL MEDICINE

## 2021-11-04 PROCEDURE — 86140 C-REACTIVE PROTEIN: CPT | Performed by: INTERNAL MEDICINE

## 2021-11-04 PROCEDURE — 86200 CCP ANTIBODY: CPT | Performed by: INTERNAL MEDICINE

## 2021-11-04 RX ORDER — CELECOXIB 100 MG/1
100 CAPSULE ORAL 2 TIMES DAILY
Qty: 60 CAPSULE | Refills: 3 | Status: SHIPPED | OUTPATIENT
Start: 2021-11-04 | End: 2022-05-13 | Stop reason: SDUPTHER

## 2021-11-04 RX ORDER — CYCLOBENZAPRINE HCL 5 MG
5 TABLET ORAL
Qty: 30 TABLET | Refills: 3 | Status: SHIPPED | OUTPATIENT
Start: 2021-11-04 | End: 2022-05-13 | Stop reason: SDUPTHER

## 2021-11-05 ENCOUNTER — TELEPHONE (OUTPATIENT)
Dept: OBGYN CLINIC | Facility: HOSPITAL | Age: 36
End: 2021-11-05

## 2021-11-05 LAB — CCP AB SER IA-ACNC: 1.4

## 2021-11-06 DIAGNOSIS — M19.90 INFLAMMATORY ARTHRITIS: Primary | ICD-10-CM

## 2021-11-06 DIAGNOSIS — E55.9 VITAMIN D DEFICIENCY: ICD-10-CM

## 2021-11-06 RX ORDER — ERGOCALCIFEROL 1.25 MG/1
50000 CAPSULE ORAL WEEKLY
Qty: 12 CAPSULE | Refills: 1 | Status: SHIPPED | OUTPATIENT
Start: 2021-11-06 | End: 2022-05-14 | Stop reason: SDUPTHER

## 2021-11-06 RX ORDER — PREDNISONE 1 MG/1
TABLET ORAL
Qty: 70 TABLET | Refills: 0 | Status: SHIPPED | OUTPATIENT
Start: 2021-11-06

## 2021-11-15 ENCOUNTER — OFFICE VISIT (OUTPATIENT)
Dept: FAMILY MEDICINE CLINIC | Facility: CLINIC | Age: 36
End: 2021-11-15
Payer: COMMERCIAL

## 2021-11-15 VITALS
HEIGHT: 60 IN | BODY MASS INDEX: 42.8 KG/M2 | SYSTOLIC BLOOD PRESSURE: 110 MMHG | WEIGHT: 218 LBS | OXYGEN SATURATION: 99 % | TEMPERATURE: 96.5 F | HEART RATE: 94 BPM | DIASTOLIC BLOOD PRESSURE: 60 MMHG

## 2021-11-15 DIAGNOSIS — Z23 FLU VACCINE NEED: Primary | ICD-10-CM

## 2021-11-15 PROBLEM — Z00.00 WELL ADULT EXAM: Status: ACTIVE | Noted: 2021-11-15

## 2021-11-15 PROCEDURE — 99395 PREV VISIT EST AGE 18-39: CPT | Performed by: FAMILY MEDICINE

## 2021-11-15 PROCEDURE — 90682 RIV4 VACC RECOMBINANT DNA IM: CPT

## 2021-11-15 PROCEDURE — 90471 IMMUNIZATION ADMIN: CPT

## 2022-02-24 ENCOUNTER — HOSPITAL ENCOUNTER (EMERGENCY)
Facility: HOSPITAL | Age: 37
Discharge: HOME | End: 2022-02-24
Attending: EMERGENCY MEDICINE | Admitting: EMERGENCY MEDICINE
Payer: COMMERCIAL

## 2022-02-24 ENCOUNTER — APPOINTMENT (EMERGENCY)
Dept: RADIOLOGY | Facility: HOSPITAL | Age: 37
End: 2022-02-24
Payer: COMMERCIAL

## 2022-02-24 VITALS
RESPIRATION RATE: 18 BRPM | OXYGEN SATURATION: 100 % | SYSTOLIC BLOOD PRESSURE: 102 MMHG | HEART RATE: 72 BPM | DIASTOLIC BLOOD PRESSURE: 65 MMHG | TEMPERATURE: 98.1 F

## 2022-02-24 DIAGNOSIS — M19.90 INFLAMMATORY ARTHRITIS: Primary | ICD-10-CM

## 2022-02-24 DIAGNOSIS — R07.9 CHEST PAIN, UNSPECIFIED: Primary | ICD-10-CM

## 2022-02-24 LAB
ANION GAP SERPL CALCULATED.3IONS-SCNC: 8 MMOL/L (ref 4–13)
ATRIAL RATE: 66 BPM
ATRIAL RATE: 70 BPM
BASOPHILS # BLD AUTO: 0.02 THOUSANDS/ΜL (ref 0–0.1)
BASOPHILS NFR BLD AUTO: 0 % (ref 0–1)
BILIRUB UR QL STRIP: NEGATIVE
BUN SERPL-MCNC: 8 MG/DL (ref 5–25)
CALCIUM SERPL-MCNC: 8 MG/DL (ref 8.3–10.1)
CARDIAC TROPONIN I PNL SERPL HS: <2 NG/L
CARDIAC TROPONIN I PNL SERPL HS: <2 NG/L
CHLORIDE SERPL-SCNC: 104 MMOL/L (ref 100–108)
CLARITY UR: NORMAL
CO2 SERPL-SCNC: 26 MMOL/L (ref 21–32)
COLOR UR: YELLOW
CREAT SERPL-MCNC: 0.66 MG/DL (ref 0.6–1.3)
CRP SERPL QL: 8.7 MG/L
EOSINOPHIL # BLD AUTO: 0.08 THOUSAND/ΜL (ref 0–0.61)
EOSINOPHIL NFR BLD AUTO: 1 % (ref 0–6)
ERYTHROCYTE [DISTWIDTH] IN BLOOD BY AUTOMATED COUNT: 14.3 % (ref 11.6–15.1)
EXT PREG TEST URINE: NORMAL
EXT. CONTROL ED NAV: NORMAL
GFR SERPL CREATININE-BSD FRML MDRD: 114 ML/MIN/1.73SQ M
GLUCOSE SERPL-MCNC: 106 MG/DL (ref 65–140)
GLUCOSE UR STRIP-MCNC: NEGATIVE MG/DL
HCT VFR BLD AUTO: 38.9 % (ref 34.8–46.1)
HGB BLD-MCNC: 12.6 G/DL (ref 11.5–15.4)
HGB UR QL STRIP.AUTO: NEGATIVE
IMM GRANULOCYTES # BLD AUTO: 0.02 THOUSAND/UL (ref 0–0.2)
IMM GRANULOCYTES NFR BLD AUTO: 0 % (ref 0–2)
KETONES UR STRIP-MCNC: NEGATIVE MG/DL
LEUKOCYTE ESTERASE UR QL STRIP: NEGATIVE
LYMPHOCYTES # BLD AUTO: 2.03 THOUSANDS/ΜL (ref 0.6–4.47)
LYMPHOCYTES NFR BLD AUTO: 32 % (ref 14–44)
MAGNESIUM SERPL-MCNC: 2.2 MG/DL (ref 1.6–2.6)
MCH RBC QN AUTO: 29.6 PG (ref 26.8–34.3)
MCHC RBC AUTO-ENTMCNC: 32.4 G/DL (ref 31.4–37.4)
MCV RBC AUTO: 92 FL (ref 82–98)
MONOCYTES # BLD AUTO: 0.43 THOUSAND/ΜL (ref 0.17–1.22)
MONOCYTES NFR BLD AUTO: 7 % (ref 4–12)
NEUTROPHILS # BLD AUTO: 3.84 THOUSANDS/ΜL (ref 1.85–7.62)
NEUTS SEG NFR BLD AUTO: 60 % (ref 43–75)
NITRITE UR QL STRIP: NEGATIVE
NRBC BLD AUTO-RTO: 0 /100 WBCS
P AXIS: 45 DEGREES
P AXIS: 50 DEGREES
PH UR STRIP.AUTO: 7 [PH] (ref 4.5–8)
PLATELET # BLD AUTO: 291 THOUSANDS/UL (ref 149–390)
PMV BLD AUTO: 9.8 FL (ref 8.9–12.7)
POTASSIUM SERPL-SCNC: 3.9 MMOL/L (ref 3.5–5.3)
PR INTERVAL: 130 MS
PR INTERVAL: 140 MS
PROT UR STRIP-MCNC: NEGATIVE MG/DL
QRS AXIS: 15 DEGREES
QRS AXIS: 19 DEGREES
QRSD INTERVAL: 76 MS
QRSD INTERVAL: 84 MS
QT INTERVAL: 390 MS
QT INTERVAL: 398 MS
QTC INTERVAL: 417 MS
QTC INTERVAL: 421 MS
RBC # BLD AUTO: 4.25 MILLION/UL (ref 3.81–5.12)
SODIUM SERPL-SCNC: 138 MMOL/L (ref 136–145)
SP GR UR STRIP.AUTO: 1.01 (ref 1–1.03)
T WAVE AXIS: 18 DEGREES
T WAVE AXIS: 22 DEGREES
UROBILINOGEN UR QL STRIP.AUTO: 0.2 E.U./DL
VENTRICULAR RATE: 66 BPM
VENTRICULAR RATE: 70 BPM
WBC # BLD AUTO: 6.42 THOUSAND/UL (ref 4.31–10.16)

## 2022-02-24 PROCEDURE — 36415 COLL VENOUS BLD VENIPUNCTURE: CPT | Performed by: PHYSICIAN ASSISTANT

## 2022-02-24 PROCEDURE — 86140 C-REACTIVE PROTEIN: CPT | Performed by: INTERNAL MEDICINE

## 2022-02-24 PROCEDURE — 93010 ELECTROCARDIOGRAM REPORT: CPT | Performed by: INTERNAL MEDICINE

## 2022-02-24 PROCEDURE — 99285 EMERGENCY DEPT VISIT HI MDM: CPT

## 2022-02-24 PROCEDURE — 80048 BASIC METABOLIC PNL TOTAL CA: CPT | Performed by: PHYSICIAN ASSISTANT

## 2022-02-24 PROCEDURE — 96374 THER/PROPH/DIAG INJ IV PUSH: CPT

## 2022-02-24 PROCEDURE — 71045 X-RAY EXAM CHEST 1 VIEW: CPT

## 2022-02-24 PROCEDURE — 93005 ELECTROCARDIOGRAM TRACING: CPT

## 2022-02-24 PROCEDURE — 99285 EMERGENCY DEPT VISIT HI MDM: CPT | Performed by: PHYSICIAN ASSISTANT

## 2022-02-24 PROCEDURE — 84484 ASSAY OF TROPONIN QUANT: CPT | Performed by: PHYSICIAN ASSISTANT

## 2022-02-24 PROCEDURE — 96375 TX/PRO/DX INJ NEW DRUG ADDON: CPT

## 2022-02-24 PROCEDURE — 96361 HYDRATE IV INFUSION ADD-ON: CPT

## 2022-02-24 PROCEDURE — 83735 ASSAY OF MAGNESIUM: CPT | Performed by: PHYSICIAN ASSISTANT

## 2022-02-24 PROCEDURE — 81003 URINALYSIS AUTO W/O SCOPE: CPT

## 2022-02-24 PROCEDURE — 81025 URINE PREGNANCY TEST: CPT | Performed by: PHYSICIAN ASSISTANT

## 2022-02-24 PROCEDURE — 85025 COMPLETE CBC W/AUTO DIFF WBC: CPT | Performed by: PHYSICIAN ASSISTANT

## 2022-02-24 RX ORDER — KETOROLAC TROMETHAMINE 30 MG/ML
15 INJECTION, SOLUTION INTRAMUSCULAR; INTRAVENOUS ONCE
Status: COMPLETED | OUTPATIENT
Start: 2022-02-24 | End: 2022-02-24

## 2022-02-24 RX ORDER — SODIUM CHLORIDE 9 MG/ML
3 INJECTION INTRAVENOUS
Status: DISCONTINUED | OUTPATIENT
Start: 2022-02-24 | End: 2022-02-24 | Stop reason: HOSPADM

## 2022-02-24 RX ORDER — ONDANSETRON 2 MG/ML
1 INJECTION INTRAMUSCULAR; INTRAVENOUS ONCE
Status: COMPLETED | OUTPATIENT
Start: 2022-02-24 | End: 2022-02-24

## 2022-02-24 RX ADMIN — FAMOTIDINE 20 MG: 10 INJECTION INTRAVENOUS at 10:09

## 2022-02-24 RX ADMIN — KETOROLAC TROMETHAMINE 15 MG: 30 INJECTION, SOLUTION INTRAMUSCULAR at 09:19

## 2022-02-24 RX ADMIN — MORPHINE SULFATE 2 MG: 2 INJECTION, SOLUTION INTRAMUSCULAR; INTRAVENOUS at 10:09

## 2022-02-24 RX ADMIN — SODIUM CHLORIDE 1000 ML: 0.9 INJECTION, SOLUTION INTRAVENOUS at 10:09

## 2022-02-24 NOTE — ED PROVIDER NOTES
History  Chief Complaint   Patient presents with    Chest Pain     Patient arrvies via EMS c/o chest pain that started last night  Patient states the pain radiated to the left jaw and down the left arm  This is a 30 y/o female, PMHx of asthma, scoliosis, depression who presents to the ED with CP since last night  C/O substernal CP that was radiating down the left arm and then into the neck  Woke up with pain multiple times throughout the night and then got nauseated and dizzy while getting her  from work this morning  Due to increase in symptoms, EMS was called  Patient was given 4mg of zofran, 324 ASA and 1 nitro which brought symptoms down from 8/10 to 5/10  No fever or chills  No cough or SOB noted  No new medications recently  Chest Pain  Associated symptoms: dizziness, headache (after nitro) and nausea    Associated symptoms: no abdominal pain, no back pain, no cough, no fever, no palpitations, no shortness of breath and not vomiting        Prior to Admission Medications   Prescriptions Last Dose Informant Patient Reported? Taking? albuterol (2 5 mg/3 mL) 0 083 % nebulizer solution   No Yes   Sig: Take 1 vial (2 5 mg total) by nebulization every 6 (six) hours as needed for wheezing or shortness of breath   albuterol (PROVENTIL HFA,VENTOLIN HFA) 90 mcg/act inhaler  Self No Yes   Sig: Inhale 2 puffs every 4 (four) hours as needed (as needed)   budesonide-formoterol (SYMBICORT) 160-4 5 mcg/act inhaler   No Yes   Sig: Inhale 2 puffs 2 (two) times a day Rinse mouth after use     celecoxib (CeleBREX) 100 mg capsule   No Yes   Sig: Take 1 capsule (100 mg total) by mouth 2 (two) times a day   cyclobenzaprine (FLEXERIL) 5 mg tablet   No Yes   Sig: Take 1 tablet (5 mg total) by mouth daily at bedtime   ergocalciferol (VITAMIN D2) 50,000 units   No Yes   Sig: Take 1 capsule (50,000 Units total) by mouth once a week   fexofenadine-pseudoephedrine (ALLEGRA-D 24) 180-240 MG per 24 hr tablet   No Yes Sig: Take 1 tablet by mouth daily   meclizine (ANTIVERT) 25 mg tablet   No Yes   Sig: Take 1 tablet (25 mg total) by mouth 3 (three) times a day as needed for dizziness   omeprazole (PriLOSEC) 40 MG capsule   No Yes   Sig: Take 1 capsule (40 mg total) by mouth daily   predniSONE 5 mg tablet   No Yes   Sig: Take 4 tabs all at once in the morning x7 days, then 3 tabs x7 days, then 2 tabs x7 days, then 1 tab x7 days, then stop        Facility-Administered Medications: None       Past Medical History:   Diagnosis Date    Allergic     Anxiety     Asthma     BRCA1 negative     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     Depression     GERD (gastroesophageal reflux disease)     Obesity     Scoliosis     Visual impairment        Past Surgical History:   Procedure Laterality Date    HERNIA REPAIR         Family History   Problem Relation Age of Onset    Thyroid disease Mother     Arthritis Mother     Diabetes Mother     Depression Father     Arthritis Father     No Known Problems Sister     No Known Problems Brother     No Known Problems Son     Coronary artery disease Maternal Grandmother     Hyperlipidemia Maternal Grandmother     Thyroid disease Maternal Grandmother     Asthma Maternal Grandmother     Arthritis Maternal Grandmother     Vision loss Maternal Grandmother     Breast cancer Maternal Grandmother         age unknown    Diabetes Maternal Grandmother     Hyperlipidemia Maternal Grandfather     Thyroid disease Maternal Grandfather     Diabetes Maternal Grandfather     Ovarian cancer Maternal Aunt         46s    Diabetes Maternal Aunt     Diabetes Maternal Uncle     No Known Problems Daughter     No Known Problems Paternal Grandmother     No Known Problems Paternal Grandfather     Colon cancer Maternal Aunt     Diabetes Maternal Aunt     Sickle cell anemia Paternal Aunt     Sickle cell anemia Paternal Aunt     No Known Problems Paternal Aunt     No Known Problems Paternal Aunt     No Known Problems Paternal Aunt     No Known Problems Paternal Aunt     Diabetes Maternal Uncle     No Known Problems Paternal Uncle     No Known Problems Paternal Uncle     Sickle cell anemia Paternal Uncle      I have reviewed and agree with the history as documented  E-Cigarette/Vaping    E-Cigarette Use Never User      E-Cigarette/Vaping Substances    Nicotine No     THC No     CBD No     Flavoring No     Other No     Unknown No      Social History     Tobacco Use    Smoking status: Former Smoker     Packs/day: 0 50     Quit date:      Years since quittin 1    Smokeless tobacco: Never Used   Vaping Use    Vaping Use: Never used   Substance Use Topics    Alcohol use: Yes     Comment: occasionally    Drug use: Never       Review of Systems   Constitutional: Negative for chills and fever  HENT: Negative for ear pain and sore throat  Eyes: Negative for pain and visual disturbance  Respiratory: Negative for cough and shortness of breath  Cardiovascular: Positive for chest pain  Negative for palpitations  Gastrointestinal: Positive for nausea  Negative for abdominal pain and vomiting  Genitourinary: Negative for dysuria and hematuria  Musculoskeletal: Negative for arthralgias and back pain  Skin: Negative for color change and rash  Neurological: Positive for dizziness and headaches (after nitro)  Negative for seizures and syncope  Psychiatric/Behavioral: Positive for sleep disturbance  The patient is not nervous/anxious  All other systems reviewed and are negative  Physical Exam  Physical Exam  Vitals and nursing note reviewed  Constitutional:       General: She is not in acute distress  Appearance: Normal appearance  She is not ill-appearing, toxic-appearing or diaphoretic  HENT:      Head: Normocephalic and atraumatic  Mouth/Throat:      Mouth: Mucous membranes are moist    Eyes:      General: No scleral icterus       Pupils: Pupils are equal, round, and reactive to light  Cardiovascular:      Rate and Rhythm: Normal rate and regular rhythm  Pulses: Normal pulses  Heart sounds: Normal heart sounds  Pulmonary:      Effort: Pulmonary effort is normal       Breath sounds: Normal breath sounds  Abdominal:      General: Abdomen is flat  There is no distension  Palpations: Abdomen is soft  Tenderness: There is no abdominal tenderness  There is no guarding or rebound  Hernia: No hernia is present  Musculoskeletal:         General: No swelling or tenderness  Normal range of motion  Cervical back: Normal range of motion  Right lower leg: No edema  Left lower leg: No edema  Skin:     General: Skin is warm and dry  Capillary Refill: Capillary refill takes less than 2 seconds  Neurological:      General: No focal deficit present  Mental Status: She is alert and oriented to person, place, and time     Psychiatric:         Mood and Affect: Mood normal          Vital Signs  ED Triage Vitals [02/24/22 0836]   Temperature Pulse Respirations Blood Pressure SpO2   98 1 °F (36 7 °C) 78 18 107/58 100 %      Temp Source Heart Rate Source Patient Position - Orthostatic VS BP Location FiO2 (%)   Oral Monitor Lying Right arm --      Pain Score       5           Vitals:    02/24/22 0836 02/24/22 1110   BP: 107/58 102/65   Pulse: 78 72   Patient Position - Orthostatic VS: Lying Lying         Visual Acuity      ED Medications  Medications   sodium chloride (PF) 0 9 % injection 3 mL (has no administration in time range)   ondansetron (FOR EMS ONLY) (ZOFRAN) 4 mg/2 mL injection 4 mg (0 mg Does not apply Given to EMS 2/24/22 0906)   ketorolac (TORADOL) injection 15 mg (15 mg Intravenous Given 2/24/22 0919)   sodium chloride 0 9 % bolus 1,000 mL (0 mL Intravenous Stopped 2/24/22 1133)   morphine injection 2 mg (2 mg Intravenous Given 2/24/22 1009)   famotidine (PEPCID) injection 20 mg (20 mg Intravenous Given 2/24/22 1009) Diagnostic Studies  Results Reviewed     Procedure Component Value Units Date/Time    HS Troponin I 2hr [[id number]] Collected: 02/24/22 1109    Lab Status: Final result Specimen: Blood from Arm, Left Updated: 02/24/22 1139     hs TnI 2hr <2 ng/L      Delta 2hr hsTnI --    Magnesium [[id number]]  (Normal) Collected: 02/24/22 0910    Lab Status: Final result Specimen: Blood from Arm, Left Updated: 02/24/22 1027     Magnesium 2 2 mg/dL     HS Troponin 0hr (reflex protocol) [[id number]]  (Normal) Collected: 02/24/22 0910    Lab Status: Final result Specimen: Blood from Arm, Left Updated: 02/24/22 0938     hs TnI 0hr <2 ng/L     Basic metabolic panel [[id number]]  (Abnormal) Collected: 02/24/22 0910    Lab Status: Final result Specimen: Blood from Arm, Left Updated: 02/24/22 0935     Sodium 138 mmol/L      Potassium 3 9 mmol/L      Chloride 104 mmol/L      CO2 26 mmol/L      ANION GAP 8 mmol/L      BUN 8 mg/dL      Creatinine 0 66 mg/dL      Glucose 106 mg/dL      Calcium 8 0 mg/dL      eGFR 114 ml/min/1 73sq m     Narrative:      [place] guidelines for Chronic Kidney Disease (CKD):     Stage 1 with normal or high GFR (GFR > 90 mL/min/1 73 square meters)    Stage 2 Mild CKD (GFR = 60-89 mL/min/1 73 square meters)    Stage 3A Moderate CKD (GFR = 45-59 mL/min/1 73 square meters)    Stage 3B Moderate CKD (GFR = 30-44 mL/min/1 73 square meters)    Stage 4 Severe CKD (GFR = 15-29 mL/min/1 73 square meters)    Stage 5 End Stage CKD (GFR <15 mL/min/1 73 square meters)  Note: GFR calculation is accurate only with a steady state creatinine    POCT pregnancy, urine [[id number]]  (Normal) Resulted: 02/24/22 0927    Lab Status: Final result Updated: 02/24/22 0927     EXT PREG TEST UR (Ref: Negative) Negative (-)     Control Valid    Urine Macroscopic, POC [[id number]] Collected: 02/24/22 0925    Lab Status: Final result Specimen: Urine Updated: 02/24/22 0927     Color, UA Yellow     Clarity, UA Slightly Cloudy     pH, UA 7 0     Leukocytes, UA Negative     Nitrite, UA Negative     Protein, UA Negative mg/dl      Glucose, UA Negative mg/dl      Ketones, UA Negative mg/dl      Urobilinogen, UA 0 2 E U /dl      Bilirubin, UA Negative     Blood, UA Negative     Specific Gravity, UA 1 015    Narrative:      CLIN[facility] RESULT    CBC and differential [[id number]] Collected: 02/24/22 0910    Lab Status: Final result Specimen: Blood from Arm, Left Updated: 02/24/22 0916     WBC 6 42 Thousand/uL      RBC 4 25 Million/uL      Hemoglobin 12 6 g/dL      Hematocrit 38 9 %      MCV 92 fL      MCH 29 6 pg      MCHC 32 4 g/dL      RDW 14 3 %      MPV 9 8 fL      Platelets 846 Thousands/uL      nRBC 0 /100 WBCs      Neutrophils Relative 60 %      Immat GRANS % 0 %      Lymphocytes Relative 32 %      Monocytes Relative 7 %      Eosinophils Relative 1 %      Basophils Relative 0 %      Neutrophils Absolute 3 84 Thousands/µL      Immature Grans Absolute 0 02 Thousand/uL      Lymphocytes Absolute 2 03 Thousands/µL      Monocytes Absolute 0 43 Thousand/µL      Eosinophils Absolute 0 08 Thousand/µL      Basophils Absolute 0 02 Thousands/µL                  X-ray chest 1 view portable   ED Interpretation by Pham Keller PA-C (02/24 9888)   No acute cardiopulmonary abnormality noted  Final Result by Lucio Robles MD (02/24 9508)      No acute cardiopulmonary disease  Workstation performed: [id number]                    Procedures  Procedures         ED Course  ED Course as of 02/24/22 1201   Thu Feb 24, 2022   0911 EKG:Normal sinus rhythm  Normal ECG  No previous ECGs available   1004 Patient still with 5/10 pain, will give morphine and pepcid  Initial cardiac work up is negative   Will await delta troponin   1028 Magnesium 2 2   1124 Repeat EKG normal   1148 Delta is 0             HEART Risk Score      Most Recent Value   Heart Score Risk Calculator    History 0 Filed at: 02/24/2022 1158   ECG 0 Filed at: 02/24/2022 1158   Age 0 Filed at: 02/24/2022 1158   Risk Factors 1 Filed at: 02/24/2022 1158   Troponin 0 Filed at: 02/24/2022 1158   HEART Score 1 Filed at: 02/24/2022 1158                        SBIRT 22yo+      Most Recent Value   SBIRT (25 yo +)    In order to provide better care to our patients, we are screening all of our patients for alcohol and drug use  Would it be okay to ask you these screening questions? No Filed at: 02/24/2022 1018                    [facility]  Number of Diagnoses or Management Options  Chest pain, unspecified: new and requires workup  Diagnosis management comments: The patient was seen and examined  Laboratory studies revealed negative troponin x 2  Imaging revealed no acute abnormalities  The patient was treated with zofran, morphine, pepcid and toradol  The patient was re-examined after treatment and disposition of discharge to home was made  The test results were discussed with the patient/family  All questions were answered to patient/family's satisfaction  Anticipatory guidance and return precautions were discussed at length  They verbalized understanding and agreement with the plan  The patient remained stable while under my care in the Emergency Department            Amount and/or Complexity of Data Reviewed  Clinical lab tests: ordered and reviewed  Tests in the radiology section of CPT®: ordered and reviewed  Independent visualization of images, tracings, or specimens: yes    Risk of Complications, Morbidity, and/or Mortality  Presenting problems: moderate  Diagnostic procedures: moderate  Management options: moderate    Patient Progress  Patient progress: improved      Disposition  Final diagnoses:   Chest pain, unspecified     Time reflects when diagnosis was documented in both MDM as applicable and the Disposition within this note     Time User Action Codes Description Comment    2/24/2022 11:59 AM Courtney Barrios Add [R07 9] Chest pain, unspecified       ED Disposition     ED Disposition Condition Date/Time Comment    Discharge Stable Thu Feb 24, 2022 11:59 AM Gautam Kumar discharge to home/self care  Follow-up Information     Follow up With Specialties Details Why [address] ,  Family Medicine   [address] [place] [id number]  [phone]            Patient's Medications   Discharge Prescriptions    No medications on file       No discharge procedures on file      PDMP Review       Value Time User    PDMP Reviewed  Yes 8/3/2021  8:03 AM Luigi Ramirez DO          ED Provider  Electronically Signed by           Jesus Miranda PA-C  02/24/22 1200

## 2022-02-24 NOTE — DISCHARGE INSTRUCTIONS
Results for orders placed or performed during the hospital encounter of 02/24/22   CBC and differential   Result Value Ref Range    WBC 6 42 4 31 - 10 16 Thousand/uL    RBC 4 25 3 81 - 5 12 Million/uL    Hemoglobin 12 6 11 5 - 15 4 g/dL    Hematocrit 38 9 34 8 - 46 1 %    MCV 92 82 - 98 fL    MCH 29 6 26 8 - 34 3 pg    MCHC 32 4 31 4 - 37 4 g/dL    RDW 14 3 11 6 - 15 1 %    MPV 9 8 8 9 - 12 7 fL    Platelets 674 772 - 640 Thousands/uL    nRBC 0 /100 WBCs    Neutrophils Relative 60 43 - 75 %    Immat GRANS % 0 0 - 2 %    Lymphocytes Relative 32 14 - 44 %    Monocytes Relative 7 4 - 12 %    Eosinophils Relative 1 0 - 6 %    Basophils Relative 0 0 - 1 %    Neutrophils Absolute 3 84 1 85 - 7 62 Thousands/µL    Immature Grans Absolute 0 02 0 00 - 0 20 Thousand/uL    Lymphocytes Absolute 2 03 0 60 - 4 47 Thousands/µL    Monocytes Absolute 0 43 0 17 - 1 22 Thousand/µL    Eosinophils Absolute 0 08 0 00 - 0 61 Thousand/µL    Basophils Absolute 0 02 0 00 - 0 10 Thousands/µL   Basic metabolic panel   Result Value Ref Range    Sodium 138 136 - 145 mmol/L    Potassium 3 9 3 5 - 5 3 mmol/L    Chloride 104 100 - 108 mmol/L    CO2 26 21 - 32 mmol/L    ANION GAP 8 4 - 13 mmol/L    BUN 8 5 - 25 mg/dL    Creatinine 0 66 0 60 - 1 30 mg/dL    Glucose 106 65 - 140 mg/dL    Calcium 8 0 (L) 8 3 - 10 1 mg/dL    eGFR 114 ml/min/1 73sq m   HS Troponin 0hr (reflex protocol)   Result Value Ref Range    hs TnI 0hr <2 "Refer to ACS Flowchart"- see link ng/L   Magnesium   Result Value Ref Range    Magnesium 2 2 1 6 - 2 6 mg/dL   HS Troponin I 2hr   Result Value Ref Range    hs TnI 2hr <2 "Refer to ACS Flowchart"- see link ng/L    Delta 2hr hsTnI     POCT pregnancy, urine   Result Value Ref Range    EXT PREG TEST UR (Ref: Negative) Negative (-)     Control Valid    ECG 12 lead   Result Value Ref Range    Ventricular Rate 70 BPM    Atrial Rate 70 BPM    ME Interval 130 ms    QRSD Interval 76 ms    QT Interval 390 ms    QTC Interval 421 ms    P Axis 45 degrees    QRS Axis 15 degrees    T Wave Axis 22 degrees   ECG 12 lead   Result Value Ref Range    Ventricular Rate 66 BPM    Atrial Rate 66 BPM    MD Interval 140 ms    QRSD Interval 84 ms    QT Interval 398 ms    QTC Interval 417 ms    P Axis 50 degrees    QRS Axis 19 degrees    T Wave Axis 18 degrees   Urine Macroscopic, POC   Result Value Ref Range    Color, UA Yellow     Clarity, UA Slightly Cloudy     pH, UA 7 0 4 5 - 8 0    Leukocytes, UA Negative Negative    Nitrite, UA Negative Negative    Protein, UA Negative Negative mg/dl    Glucose, UA Negative Negative mg/dl    Ketones, UA Negative Negative mg/dl    Urobilinogen, UA 0 2 0 2, 1 0 E U /dl E U /dl    Bilirubin, UA Negative Negative    Blood, UA Negative Negative    Specific Gravity, UA 1 015 1 003 - 1 030     X-ray chest 1 view portable   ED Interpretation   No acute cardiopulmonary abnormality noted  Final Result      No acute cardiopulmonary disease                    Workstation performed: [id number]

## 2022-05-13 ENCOUNTER — OFFICE VISIT (OUTPATIENT)
Dept: RHEUMATOLOGY | Facility: CLINIC | Age: 37
End: 2022-05-13
Payer: COMMERCIAL

## 2022-05-13 ENCOUNTER — APPOINTMENT (OUTPATIENT)
Dept: LAB | Facility: MEDICAL CENTER | Age: 37
End: 2022-05-13
Payer: COMMERCIAL

## 2022-05-13 VITALS
TEMPERATURE: 97.6 F | OXYGEN SATURATION: 98 % | BODY MASS INDEX: 42.8 KG/M2 | DIASTOLIC BLOOD PRESSURE: 66 MMHG | WEIGHT: 218 LBS | SYSTOLIC BLOOD PRESSURE: 108 MMHG | HEART RATE: 86 BPM | HEIGHT: 60 IN

## 2022-05-13 DIAGNOSIS — M79.641 BILATERAL HAND PAIN: ICD-10-CM

## 2022-05-13 DIAGNOSIS — M25.561 CHRONIC ARTHRALGIAS OF KNEES AND HIPS: ICD-10-CM

## 2022-05-13 DIAGNOSIS — M25.552 CHRONIC ARTHRALGIAS OF KNEES AND HIPS: ICD-10-CM

## 2022-05-13 DIAGNOSIS — Z79.899 HIGH RISK MEDICATION USE: ICD-10-CM

## 2022-05-13 DIAGNOSIS — F51.01 PRIMARY INSOMNIA: ICD-10-CM

## 2022-05-13 DIAGNOSIS — M79.642 BILATERAL HAND PAIN: ICD-10-CM

## 2022-05-13 DIAGNOSIS — G89.29 CHRONIC ARTHRALGIAS OF KNEES AND HIPS: ICD-10-CM

## 2022-05-13 DIAGNOSIS — E55.9 VITAMIN D DEFICIENCY: ICD-10-CM

## 2022-05-13 DIAGNOSIS — M25.562 CHRONIC ARTHRALGIAS OF KNEES AND HIPS: ICD-10-CM

## 2022-05-13 DIAGNOSIS — M79.10 MYALGIA: ICD-10-CM

## 2022-05-13 DIAGNOSIS — R79.89 LOW VITAMIN D LEVEL: ICD-10-CM

## 2022-05-13 DIAGNOSIS — M19.90 INFLAMMATORY ARTHRITIS: Primary | ICD-10-CM

## 2022-05-13 DIAGNOSIS — M25.551 CHRONIC ARTHRALGIAS OF KNEES AND HIPS: ICD-10-CM

## 2022-05-13 LAB
25(OH)D3 SERPL-MCNC: 13.9 NG/ML (ref 30–100)
ALBUMIN SERPL BCP-MCNC: 3.4 G/DL (ref 3.5–5)
ALP SERPL-CCNC: 105 U/L (ref 46–116)
ALT SERPL W P-5'-P-CCNC: 26 U/L (ref 12–78)
ANION GAP SERPL CALCULATED.3IONS-SCNC: -1 MMOL/L (ref 4–13)
AST SERPL W P-5'-P-CCNC: 13 U/L (ref 5–45)
BASOPHILS # BLD AUTO: 0.03 THOUSANDS/ΜL (ref 0–0.1)
BASOPHILS NFR BLD AUTO: 0 % (ref 0–1)
BILIRUB SERPL-MCNC: 0.17 MG/DL (ref 0.2–1)
BUN SERPL-MCNC: 8 MG/DL (ref 5–25)
CALCIUM ALBUM COR SERPL-MCNC: 9.5 MG/DL (ref 8.3–10.1)
CALCIUM SERPL-MCNC: 9 MG/DL (ref 8.3–10.1)
CHLORIDE SERPL-SCNC: 108 MMOL/L (ref 100–108)
CO2 SERPL-SCNC: 30 MMOL/L (ref 21–32)
CREAT SERPL-MCNC: 0.7 MG/DL (ref 0.6–1.3)
CRP SERPL QL: 15 MG/L
EOSINOPHIL # BLD AUTO: 0.15 THOUSAND/ΜL (ref 0–0.61)
EOSINOPHIL NFR BLD AUTO: 2 % (ref 0–6)
ERYTHROCYTE [DISTWIDTH] IN BLOOD BY AUTOMATED COUNT: 14 % (ref 11.6–15.1)
ERYTHROCYTE [SEDIMENTATION RATE] IN BLOOD: 39 MM/HOUR (ref 0–19)
GFR SERPL CREATININE-BSD FRML MDRD: 112 ML/MIN/1.73SQ M
GLUCOSE SERPL-MCNC: 80 MG/DL (ref 65–140)
HCT VFR BLD AUTO: 40.8 % (ref 34.8–46.1)
HGB BLD-MCNC: 12.8 G/DL (ref 11.5–15.4)
IMM GRANULOCYTES # BLD AUTO: 0.04 THOUSAND/UL (ref 0–0.2)
IMM GRANULOCYTES NFR BLD AUTO: 0 % (ref 0–2)
LYMPHOCYTES # BLD AUTO: 2.45 THOUSANDS/ΜL (ref 0.6–4.47)
LYMPHOCYTES NFR BLD AUTO: 24 % (ref 14–44)
MCH RBC QN AUTO: 28.7 PG (ref 26.8–34.3)
MCHC RBC AUTO-ENTMCNC: 31.4 G/DL (ref 31.4–37.4)
MCV RBC AUTO: 92 FL (ref 82–98)
MONOCYTES # BLD AUTO: 0.84 THOUSAND/ΜL (ref 0.17–1.22)
MONOCYTES NFR BLD AUTO: 8 % (ref 4–12)
NEUTROPHILS # BLD AUTO: 6.74 THOUSANDS/ΜL (ref 1.85–7.62)
NEUTS SEG NFR BLD AUTO: 66 % (ref 43–75)
NRBC BLD AUTO-RTO: 0 /100 WBCS
PLATELET # BLD AUTO: 408 THOUSANDS/UL (ref 149–390)
PMV BLD AUTO: 10 FL (ref 8.9–12.7)
POTASSIUM SERPL-SCNC: 4.2 MMOL/L (ref 3.5–5.3)
PROT SERPL-MCNC: 7.5 G/DL (ref 6.4–8.2)
RBC # BLD AUTO: 4.46 MILLION/UL (ref 3.81–5.12)
SODIUM SERPL-SCNC: 137 MMOL/L (ref 136–145)
WBC # BLD AUTO: 10.25 THOUSAND/UL (ref 4.31–10.16)

## 2022-05-13 PROCEDURE — 99215 OFFICE O/P EST HI 40 MIN: CPT | Performed by: INTERNAL MEDICINE

## 2022-05-13 PROCEDURE — 85025 COMPLETE CBC W/AUTO DIFF WBC: CPT | Performed by: INTERNAL MEDICINE

## 2022-05-13 PROCEDURE — 86140 C-REACTIVE PROTEIN: CPT | Performed by: INTERNAL MEDICINE

## 2022-05-13 PROCEDURE — 85652 RBC SED RATE AUTOMATED: CPT | Performed by: INTERNAL MEDICINE

## 2022-05-13 PROCEDURE — 36415 COLL VENOUS BLD VENIPUNCTURE: CPT | Performed by: INTERNAL MEDICINE

## 2022-05-13 PROCEDURE — 80053 COMPREHEN METABOLIC PANEL: CPT | Performed by: INTERNAL MEDICINE

## 2022-05-13 PROCEDURE — 82306 VITAMIN D 25 HYDROXY: CPT | Performed by: INTERNAL MEDICINE

## 2022-05-13 RX ORDER — CYCLOBENZAPRINE HCL 5 MG
5 TABLET ORAL
Qty: 30 TABLET | Refills: 6 | Status: SHIPPED | OUTPATIENT
Start: 2022-05-13

## 2022-05-13 RX ORDER — CELECOXIB 200 MG/1
200 CAPSULE ORAL 2 TIMES DAILY
Qty: 60 CAPSULE | Refills: 6 | Status: SHIPPED | OUTPATIENT
Start: 2022-05-13

## 2022-05-13 NOTE — PATIENT INSTRUCTIONS
Do labs  Schedule hand ultrasound  Increase celecoxib to 200mg twice a day as needed for joint pain  Continue cyclobenzaprine 5mg at bedtime as needed for muscle pain  Will consider starting hydroxychloroquine based on your labs    Return to clinic in 4 months    Rheumatoid Arthritis   AMBULATORY CARE:   Rheumatoid arthritis  is a long-term autoimmune disease that causes inflammation and damage to your joints  RA causes your body's immune system to attack the synovial membrane (lining) in your joints  RA can also affect other organs, such as your eyes, heart, or lungs  It may also increase your risk of osteoporosis (weakened bones)  Common symptoms include the following:   Joint pain and stiffness that lasts longer than 1 hour    Swollen joints in the same joint on both sides of your body    Loss of joint movement    Firm, round nodules (growths) on your joints    Fatigue or muscle weakness    Loss of appetite or weight loss    Call your doctor or rheumatologist if:   You have a fever  You have increased joint swelling, pain, or redness  Your skin is itchy, swollen, or has a rash  Your symptoms are getting worse, even with treatment  You have questions or concerns about your condition or care  Treatment:  The goal of treatment within the first year is remission (no pain or inflammation)  If full remission cannot be reached, the goal is as few arthritis flares as possible  Early treatment can also help prevent or slow joint damage  Treatment may change after the first year, depending on how your body responds  Antirheumatics  help slow the progress of RA, and reduce pain, stiffness, and inflammation  NSAIDs , such as ibuprofen, help decrease swelling, pain, and fever  This medicine is available with or without a doctor's order  NSAIDs can cause stomach bleeding or kidney problems in certain people  If you take blood thinner medicine, always ask your healthcare provider if NSAIDs are safe for you  Always read the medicine label and follow directions  Steroids  help decrease inflammation  Biologic therapy  helps decrease joint swelling, pain, and stiffness  These medicines increase the risk of serious infection and need careful monitoring  Surgery  may be needed to remove all or part of your joint  An implant may be placed to help reduce pain and repair the joint  Manage your symptoms:   Rest when needed  Rest is important if your joints are painful  Limit your activities until your symptoms improve  Gradually start your normal activities when you can do them without pain  Avoid motions and activities that cause strain on your joints, such as heavy exercise and lifting  Use ice or heat  Both can help decrease swelling and pain  Ice may also help prevent tissue damage  Use an ice pack, or put crushed ice in a plastic bag  Cover it with a towel and place it on your joint for 15 to 20 minutes every hour or as directed  You can apply heat for 20 minutes every 2 hours  Heat treatment includes hot packs, heat lamps, warm baths, or showers  Elevate your joint  Elevation helps reduce swelling and pain  Raise your joint above the level of your heart as often as you can  Prop your painful joint on pillows to keep it above your heart comfortably  Manage RA:   Talk to your healthcare providers about your arthritis medicines  Some medicines may only be needed when you have arthritis pain  You may need to take other medicines every day to prevent arthritis from getting worse  Your healthcare providers will help you understand all your medicines and when to take them  It is important to take the medicines as directed, even if you start to feel better  You can continue to have joint damage and inflammation even if you do not feel it  Eat a variety of healthy foods  Healthy foods include fruits, vegetables, whole-grain breads, low-fat dairy products, beans, lean meats, and fish   Ask if you need to be on a special diet  A diet rich in calcium and vitamin D may decrease your risk of osteoporosis  Foods high in calcium include milk, cheese, broccoli, and tofu  Vitamin D may be found in meat, fish, fortified milk, cereal and bread  Ask if you need calcium or vitamin D supplements  Maintain a healthy weight  This may help decrease strain on joints in your back, knees, ankles, and feet  Ask your healthcare provider what a healthy weight is for you  Ask him or her to help you create a weight loss plan if you are overweight  Exercise can help you maintain a healthy weight  Go to physical or occupational therapy as directed  Physical activity can help relieve pain and stiffness  A physical therapist can teach you exercises to improve flexibility and range of motion  You may also be shown non-weight-bearing exercises that are safe for your joints, such as swimming  An occupational therapist can help you learn to do your daily activities when your joints are stiff or sore  Do not smoke  Nicotine and other chemicals in cigarettes and cigars can damage your bones and joints  Ask your healthcare provider for information if you currently smoke and need help to quit  E-cigarettes or smokeless tobacco still contain nicotine  Talk to your healthcare provider before you use these products  RA medicines and pregnancy:   Men:  Talk to your doctor about your RA and the medicines you take  Some medicines may keep your partner from getting pregnant  Talk to your doctor if you and your partner are discussing pregnancy  Women:  Talk to your gynecologist about contraceptives  Tell him or her about your RA and what medicines you take  He or she will tell you what the best contraceptive for will be  Not all contraceptives are effective if you have RA and are taking certain medicines  You may not be able to breastfeed if you are taking certain RA medicines      Support devices to help manage arthritis: Orthotic shoes or insoles  help support your feet when you walk  Crutches, a cane, or a walker  may help decrease your risk for falling  They also decrease stress on affected joints  Devices to prevent falls  include raised toilet seats and bathtub bars to help you get up from sitting  Handrails can be placed in areas where you need balance and support  Devices to help with support and rest  include splints to wear on your hands and a firm pillow while you sleep  Use a pillow that is firm enough to support your neck and head  Ask your healthcare provider about vaccines:  RA or its treatment may increase your risk for infections  Vaccines can help protect you from infections caused by certain bacteria or viruses  Follow up with your doctor as directed:  Write down your questions so you remember to ask them during your visits  © Copyright "[employer] 2022 Information is for End User's use only and may not be sold, redistributed or otherwise used for commercial purposes  All illustrations and images included in CareNotes® are the copyrighted property of [employer]  or Samina Alfonso  The above information is an  only  It is not intended as medical advice for individual conditions or treatments  Talk to your doctor, nurse or pharmacist before following any medical regimen to see if it is safe and effective for you

## 2022-05-13 NOTE — PROGRESS NOTES
Assessment and Plan:   Andrez Martinez is a 29 y o   female who presents for follow up of her likely inflammatory arthritis, possible seronegative Rheumatoid arthritis  Hand x-rays unremarkable but patient having persistently elevated inflammatory markers and hand joint symptoms; had positive response to prednisone  Bilateral hand ultrasound ordered to evaluate for synovitis  Do labs  Schedule hand ultrasound  Increase celecoxib to 200mg twice a day as needed for joint pain  Continue cyclobenzaprine 5mg at bedtime as needed for muscle pain  Continue weekly Vit  D  Initiate HCQ 200mg po bid; get regular eye exams    Return to clinic in 4 months     Plan:  Diagnoses and all orders for this visit:    Inflammatory arthritis  -     CBC and differential  -     Comprehensive metabolic panel  -     C-reactive protein  -     Sedimentation rate, automated  -     US MSK complete; Future  -     hydroxychloroquine (PLAQUENIL) 200 mg tablet; Take 1 tablet (200 mg total) by mouth in the morning and 1 tablet (200 mg total) in the evening  Bilateral hand pain  -     celecoxib (CeleBREX) 200 mg capsule; Take 1 capsule (200 mg total) by mouth in the morning and 1 capsule (200 mg total) in the evening  Low vitamin D level    Vitamin D deficiency  -     Vitamin D 25 hydroxy  -     ergocalciferol (VITAMIN D2) 50,000 units; Take 1 capsule (50,000 Units total) by mouth once a week    Primary insomnia  -     cyclobenzaprine (FLEXERIL) 5 mg tablet; Take 1 tablet (5 mg total) by mouth daily at bedtime    Myalgia  -     cyclobenzaprine (FLEXERIL) 5 mg tablet; Take 1 tablet (5 mg total) by mouth daily at bedtime    Chronic arthralgias of knees and hips  -     cyclobenzaprine (FLEXERIL) 5 mg tablet;  Take 1 tablet (5 mg total) by mouth daily at bedtime    High risk medication use  High risk medication use - Benefits and risks of hydroxychloroquine, including but not limited to retinal toxicity, corneal deposits, gastrointestinal side effects, and headaches were discussed with the patient  The need for a regular eye exam to monitor for ocular toxicity while on this medication was also explained to the patient  Follow-up plan: RTC in 4 months            Rheumatic Disease Summary  Initial visit 11/4/21: Jadyn Wall is a 29 y  o   female who presented as a Rheumatology consult referred by Dinora Arguello DO for evaluation of bilateral hand pain and swelling since she was about 33years old   Had diffuse tenderness on physical exam along with hand tenderness; also had significantly elevated ESR and CRP   There was a possibility the patient could have an inflammatory arthritis such as seronegative Rheumatoid arthritis as well as fibromyalgia symptoms  Hand xrays were ordered, pending the results, would dertermine what long-term DMARD medication to start patient on; for time-being, started prednisone taper  Labs ordered, RF and anti-CCP were negative; ESR and CRP returned elevated, vitamin-D was very low, for which I prescribed ergocalciferol 34142 units p o  weekly  Stopped diclofenac and try celebrex 200mg twice daily  She could take cyclobenzaprine 5mg at bedtime        KIAN  Doris Chan is a 29 y o   female who presents for follow up  Last clinic visit was 11/4/21 which was her initial visit  Patient admits that she feels better overall, but still gets flare-ups of pain  Felt a little better on prednisone  The following portions of the patient's history were reviewed and updated as appropriate: allergies, current medications, past family history, past medical history, past social history, past surgical history and problem list     Review of Systems:   Review of Systems   Constitutional: Negative for fatigue  HENT: Positive for sinus pressure and sinus pain  Negative for mouth sores  Eyes: Negative for pain  Respiratory: Negative for shortness of breath  Cardiovascular: Negative for leg swelling     Gastrointestinal: Indigestion/heartburn   Musculoskeletal: Positive for arthralgias, back pain, joint swelling and myalgias  Skin: Negative for rash  Neurological: Positive for weakness and numbness  Hematological: Negative for adenopathy  Psychiatric/Behavioral: Negative for sleep disturbance  Reviewed and agree      Home Medications:    Current Outpatient Medications:     albuterol (2 5 mg/3 mL) 0 083 % nebulizer solution, Take 1 vial (2 5 mg total) by nebulization every 6 (six) hours as needed for wheezing or shortness of breath, Disp: 25 vial, Rfl: 5    albuterol (PROVENTIL HFA,VENTOLIN HFA) 90 mcg/act inhaler, Inhale 2 puffs every 4 (four) hours as needed (as needed), Disp: 1 Inhaler, Rfl: 2    Allegra-D Allergy & Congestion 180-240 MG per 24 hr tablet, TAKE 1 TABLET BY MOUTH EVERY DAY, Disp: 90 tablet, Rfl: 1    budesonide-formoterol (SYMBICORT) 160-4 5 mcg/act inhaler, Inhale 2 puffs 2 (two) times a day Rinse mouth after use , Disp: 1 Inhaler, Rfl: 5    celecoxib (CeleBREX) 200 mg capsule, Take 1 capsule (200 mg total) by mouth in the morning and 1 capsule (200 mg total) in the evening , Disp: 60 capsule, Rfl: 6    cyclobenzaprine (FLEXERIL) 5 mg tablet, Take 1 tablet (5 mg total) by mouth daily at bedtime, Disp: 30 tablet, Rfl: 6    ergocalciferol (VITAMIN D2) 50,000 units, Take 1 capsule (50,000 Units total) by mouth once a week, Disp: 12 capsule, Rfl: 1    hydroxychloroquine (PLAQUENIL) 200 mg tablet, Take 1 tablet (200 mg total) by mouth in the morning and 1 tablet (200 mg total) in the evening , Disp: 60 tablet, Rfl: 5    meclizine (ANTIVERT) 25 mg tablet, Take 1 tablet (25 mg total) by mouth 3 (three) times a day as needed for dizziness, Disp: 30 tablet, Rfl: 2    omeprazole (PriLOSEC) 40 MG capsule, Take 1 capsule (40 mg total) by mouth daily, Disp: 90 capsule, Rfl: 1    predniSONE 5 mg tablet, Take 4 tabs all at once in the morning x7 days, then 3 tabs x7 days, then 2 tabs x7 days, then 1 tab x7 days, then stop  (Patient not taking: Reported on 5/13/2022), Disp: 70 tablet, Rfl: 0    Objective:    Vitals:    05/13/22 1427   BP: 108/66   Pulse: 86   Temp: 97 6 °F (36 4 °C)   SpO2: 98%   Weight: 98 9 kg (218 lb)   Height: 5' (1 524 m)       Physical Exam  Constitutional:       General: She is not in acute distress  HENT:      Head: Normocephalic and atraumatic  Eyes:      Conjunctiva/sclera: Conjunctivae normal    Cardiovascular:      Rate and Rhythm: Normal rate and regular rhythm  Heart sounds: S1 normal and S2 normal      No friction rub  Pulmonary:      Effort: Pulmonary effort is normal  No respiratory distress  Breath sounds: Normal breath sounds  No wheezing, rhonchi or rales  Musculoskeletal:         General: Tenderness present  Cervical back: Neck supple  Comments: Right wrist tenderness   Skin:     Coloration: Skin is not pale  Neurological:      Mental Status: She is alert  Mental status is at baseline  Psychiatric:         Mood and Affect: Mood normal          Behavior: Behavior normal        Reviewed labs and imaging  Imaging:   CRX 2/24/22  No acute cardiopulmonary disease      Bilateral hand x-rays from today 11/4/21 - unremarkable     6/18/21 VAS lower limb venous duplex bilateral: normal     4/23/20 XR lumbar spine: unremarkable    Labs:   Office Visit on 05/13/2022   Component Date Value Ref Range Status    WBC 05/13/2022 10 25 (A) 4 31 - 10 16 Thousand/uL Final    RBC 05/13/2022 4 46  3 81 - 5 12 Million/uL Final    Hemoglobin 05/13/2022 12 8  11 5 - 15 4 g/dL Final    Hematocrit 05/13/2022 40 8  34 8 - 46 1 % Final    MCV 05/13/2022 92  82 - 98 fL Final    MCH 05/13/2022 28 7  26 8 - 34 3 pg Final    MCHC 05/13/2022 31 4  31 4 - 37 4 g/dL Final    RDW 05/13/2022 14 0  11 6 - 15 1 % Final    MPV 05/13/2022 10 0  8 9 - 12 7 fL Final    Platelets 13/89/5108 408 (A) 149 - 390 Thousands/uL Final    nRBC 05/13/2022 0  /100 WBCs Final    Neutrophils Relative 05/13/2022 66  43 - 75 % Final    Immat GRANS % 05/13/2022 0  0 - 2 % Final    Lymphocytes Relative 05/13/2022 24  14 - 44 % Final    Monocytes Relative 05/13/2022 8  4 - 12 % Final    Eosinophils Relative 05/13/2022 2  0 - 6 % Final    Basophils Relative 05/13/2022 0  0 - 1 % Final    Neutrophils Absolute 05/13/2022 6 74  1 85 - 7 62 Thousands/µL Final    Immature Grans Absolute 05/13/2022 0 04  0 00 - 0 20 Thousand/uL Final    Lymphocytes Absolute 05/13/2022 2 45  0 60 - 4 47 Thousands/µL Final    Monocytes Absolute 05/13/2022 0 84  0 17 - 1 22 Thousand/µL Final    Eosinophils Absolute 05/13/2022 0 15  0 00 - 0 61 Thousand/µL Final    Basophils Absolute 05/13/2022 0 03  0 00 - 0 10 Thousands/µL Final    Sodium 05/13/2022 137  136 - 145 mmol/L Final    Potassium 05/13/2022 4 2  3 5 - 5 3 mmol/L Final    Chloride 05/13/2022 108  100 - 108 mmol/L Final    CO2 05/13/2022 30  21 - 32 mmol/L Final    ANION GAP 05/13/2022 -1 (A) 4 - 13 mmol/L Final    BUN 05/13/2022 8  5 - 25 mg/dL Final    Creatinine 05/13/2022 0 70  0 60 - 1 30 mg/dL Final    Standardized to IDMS reference method    Glucose 05/13/2022 80  65 - 140 mg/dL Final    If the patient is fasting, the ADA then defines impaired fasting glucose as > 100 mg/dL and diabetes as > or equal to 123 mg/dL  Specimen collection should occur prior to Sulfasalazine administration due to the potential for falsely depressed results  Specimen collection should occur prior to Sulfapyridine administration due to the potential for falsely elevated results   Calcium 05/13/2022 9 0  8 3 - 10 1 mg/dL Final    Corrected Calcium 05/13/2022 9 5  8 3 - 10 1 mg/dL Final    AST 05/13/2022 13  5 - 45 U/L Final    Specimen collection should occur prior to Sulfasalazine administration due to the potential for falsely depressed results       ALT 05/13/2022 26  12 - 78 U/L Final    Specimen collection should occur prior to Sulfasalazine and/or Sulfapyridine administration due to the potential for falsely depressed results   Alkaline Phosphatase 05/13/2022 105  46 - 116 U/L Final    Total Protein 05/13/2022 7 5  6 4 - 8 2 g/dL Final    Albumin 05/13/2022 3 4 (A) 3 5 - 5 0 g/dL Final    Total Bilirubin 05/13/2022 0 17 (A) 0 20 - 1 00 mg/dL Final    Use of this assay is not recommended for patients undergoing treatment with eltrombopag due to the potential for falsely elevated results      eGFR 05/13/2022 112  ml/min/1 73sq m Final    CRP 05/13/2022 15 0 (A) <3 0 mg/L Final    Sed Rate 05/13/2022 39 (A) 0 - 19 mm/hour Final    Vit D, 25-Hydroxy 05/13/2022 13 9 (A) 30 0 - 100 0 ng/mL Final   Orders Only on 02/24/2022   Component Date Value Ref Range Status    CRP 02/24/2022 8 7 (A) <3 0 mg/L Final   Admission on 02/24/2022, Discharged on 02/24/2022   Component Date Value Ref Range Status    WBC 02/24/2022 6 42  4 31 - 10 16 Thousand/uL Final    RBC 02/24/2022 4 25  3 81 - 5 12 Million/uL Final    Hemoglobin 02/24/2022 12 6  11 5 - 15 4 g/dL Final    Hematocrit 02/24/2022 38 9  34 8 - 46 1 % Final    MCV 02/24/2022 92  82 - 98 fL Final    MCH 02/24/2022 29 6  26 8 - 34 3 pg Final    MCHC 02/24/2022 32 4  31 4 - 37 4 g/dL Final    RDW 02/24/2022 14 3  11 6 - 15 1 % Final    MPV 02/24/2022 9 8  8 9 - 12 7 fL Final    Platelets 57/10/2685 291  149 - 390 Thousands/uL Final    nRBC 02/24/2022 0  /100 WBCs Final    Neutrophils Relative 02/24/2022 60  43 - 75 % Final    Immat GRANS % 02/24/2022 0  0 - 2 % Final    Lymphocytes Relative 02/24/2022 32  14 - 44 % Final    Monocytes Relative 02/24/2022 7  4 - 12 % Final    Eosinophils Relative 02/24/2022 1  0 - 6 % Final    Basophils Relative 02/24/2022 0  0 - 1 % Final    Neutrophils Absolute 02/24/2022 3 84  1 85 - 7 62 Thousands/µL Final    Immature Grans Absolute 02/24/2022 0 02  0 00 - 0 20 Thousand/uL Final    Lymphocytes Absolute 02/24/2022 2 03  0 60 - 4 47 Thousands/µL Final    Monocytes Absolute 02/24/2022 0 43  0 17 - 1 22 Thousand/µL Final    Eosinophils Absolute 02/24/2022 0 08  0 00 - 0 61 Thousand/µL Final    Basophils Absolute 02/24/2022 0 02  0 00 - 0 10 Thousands/µL Final    Sodium 02/24/2022 138  136 - 145 mmol/L Final    Potassium 02/24/2022 3 9  3 5 - 5 3 mmol/L Final    Chloride 02/24/2022 104  100 - 108 mmol/L Final    CO2 02/24/2022 26  21 - 32 mmol/L Final    ANION GAP 02/24/2022 8  4 - 13 mmol/L Final    BUN 02/24/2022 8  5 - 25 mg/dL Final    Creatinine 02/24/2022 0 66  0 60 - 1 30 mg/dL Final    Standardized to IDMS reference method    Glucose 02/24/2022 106  65 - 140 mg/dL Final    If the patient is fasting, the ADA then defines impaired fasting glucose as > 100 mg/dL and diabetes as > or equal to 123 mg/dL  Specimen collection should occur prior to Sulfasalazine administration due to the potential for falsely depressed results  Specimen collection should occur prior to Sulfapyridine administration due to the potential for falsely elevated results   Calcium 02/24/2022 8 0 (A) 8 3 - 10 1 mg/dL Final    eGFR 02/24/2022 114  ml/min/1 73sq m Final    hs TnI 0hr 02/24/2022 <2  "Refer to ACS Flowchart"- see link ng/L Final    Comment:                                              Initial (time 0) result  If >=50 ng/L, Myocardial injury suggested ;  Type of myocardial injury and treatment strategy  to be determined  If 5-49 ng/L, a delta result at 2 hours and or 4 hours will be needed to further evaluate  If <4 ng/L, and chest pain has been >3 hours since onset, patient may qualify for discharge based on the HEART score in the ED  If <5 ng/L and <3hours since onset of chest pain, a delta result at 2 hours will be needed to further evaluate  HS Troponin 99th Percentile URL of a Health Population=12 ng/L with a 95% Confidence Interval of 8-18 ng/L      Second Troponin (time 2 hours)  If calculated delta >= 20 ng/L,  Myocardial injury suggested ; Type of myocardial injury and treatment strategy to be determined  If 5-49 ng/L and the calculated delta is 5-19 ng/L, consult medical service for evaluation  Continue evaluation for ischemia on ecg and other possible etiology and repeat hs troponin at 4 hours  If delta                            is <5 ng/L at 2 hours, consider discharge based on risk stratification via the HEART score (if in ED), or PRAMOD risk score in IP/Observation      HS Troponin 99th Percentile URL of a Health Population=12 ng/L with a 95% Confidence Interval of 8-18 ng/L     EXT PREG TEST UR (Ref: Negative) 02/24/2022 Negative (-)   Final    Control 02/24/2022 Valid   Final    Ventricular Rate 02/24/2022 70  BPM Final    Atrial Rate 02/24/2022 70  BPM Final    LA Interval 02/24/2022 130  ms Final    QRSD Interval 02/24/2022 76  ms Final    QT Interval 02/24/2022 390  ms Final    QTC Interval 02/24/2022 421  ms Final    P Axis 02/24/2022 45  degrees Final    QRS Axis 02/24/2022 15  degrees Final    T Wave Axis 02/24/2022 22  degrees Final    Color, UA 02/24/2022 Yellow   Final    Clarity, UA 02/24/2022 Slightly Cloudy   Final    pH, UA 02/24/2022 7 0  4 5 - 8 0 Final    Leukocytes, UA 02/24/2022 Negative  Negative Final    Nitrite, UA 02/24/2022 Negative  Negative Final    Protein, UA 02/24/2022 Negative  Negative mg/dl Final    Glucose, UA 02/24/2022 Negative  Negative mg/dl Final    Ketones, UA 02/24/2022 Negative  Negative mg/dl Final    Urobilinogen, UA 02/24/2022 0 2  0 2, 1 0 E U /dl E U /dl Final    Bilirubin, UA 02/24/2022 Negative  Negative Final    Blood, UA 02/24/2022 Negative  Negative Final    Specific Gravity, UA 02/24/2022 1 015  1 003 - 1 030 Final    Magnesium 02/24/2022 2 2  1 6 - 2 6 mg/dL Final    hs TnI 2hr 02/24/2022 <2  "Refer to ACS Flowchart"- see link ng/L Final    Comment:                                              Initial (time 0) result  If >=50 ng/L, Myocardial injury suggested ;  Type of myocardial injury and treatment strategy  to be determined  If 5-49 ng/L, a delta result at 2 hours and or 4 hours will be needed to further evaluate  If <4 ng/L, and chest pain has been >3 hours since onset, patient may qualify for discharge based on the HEART score in the ED  If <5 ng/L and <3hours since onset of chest pain, a delta result at 2 hours will be needed to further evaluate  HS Troponin 99th Percentile URL of a Health Population=12 ng/L with a 95% Confidence Interval of 8-18 ng/L  Second Troponin (time 2 hours)  If calculated delta >= 20 ng/L,  Myocardial injury suggested ; Type of myocardial injury and treatment strategy to be determined  If 5-49 ng/L and the calculated delta is 5-19 ng/L, consult medical service for evaluation  Continue evaluation for ischemia on ecg and other possible etiology and repeat hs troponin at 4 hours  If delta                            is <5 ng/L at 2 hours, consider discharge based on risk stratification via the HEART score (if in ED), or PRAMOD risk score in IP/Observation  HS Troponin 99th Percentile URL of a Health Population=12 ng/L with a 95% Confidence Interval of 8-18 ng/L      Delta 2hr hsTnI 02/24/2022    Final    Unable to Calculate    Ventricular Rate 02/24/2022 66  BPM Final    Atrial Rate 02/24/2022 66  BPM Final    UT Interval 02/24/2022 140  ms Final    QRSD Interval 02/24/2022 84  ms Final    QT Interval 02/24/2022 398  ms Final    QTC Interval 02/24/2022 417  ms Final    P Axis 02/24/2022 50  degrees Final    QRS Axis 02/24/2022 19  degrees Final    T Wave Axis 02/24/2022 18  degrees Final

## 2022-05-14 RX ORDER — ERGOCALCIFEROL 1.25 MG/1
50000 CAPSULE ORAL WEEKLY
Qty: 12 CAPSULE | Refills: 1 | Status: SHIPPED | OUTPATIENT
Start: 2022-05-14

## 2022-05-14 RX ORDER — HYDROXYCHLOROQUINE SULFATE 200 MG/1
200 TABLET, FILM COATED ORAL 2 TIMES DAILY
Qty: 60 TABLET | Refills: 5 | Status: SHIPPED | OUTPATIENT
Start: 2022-05-14 | End: 2022-11-10

## 2022-06-17 ENCOUNTER — HOSPITAL ENCOUNTER (OUTPATIENT)
Dept: RADIOLOGY | Facility: HOSPITAL | Age: 37
Discharge: HOME | End: 2022-06-17
Attending: INTERNAL MEDICINE
Payer: COMMERCIAL

## 2022-06-17 DIAGNOSIS — M19.90 INFLAMMATORY ARTHRITIS: ICD-10-CM

## 2022-06-17 PROCEDURE — 76882 US LMTD JT/FCL EVL NVASC XTR: CPT

## 2022-06-20 ENCOUNTER — VBI (OUTPATIENT)
Dept: ADMINISTRATIVE | Facility: OTHER | Age: 37
End: 2022-06-20

## 2022-06-24 ENCOUNTER — OFFICE VISIT (OUTPATIENT)
Dept: FAMILY MEDICINE CLINIC | Facility: CLINIC | Age: 37
End: 2022-06-24
Payer: COMMERCIAL

## 2022-06-24 VITALS
HEART RATE: 92 BPM | SYSTOLIC BLOOD PRESSURE: 108 MMHG | DIASTOLIC BLOOD PRESSURE: 74 MMHG | HEIGHT: 60 IN | TEMPERATURE: 97.7 F | WEIGHT: 218 LBS | RESPIRATION RATE: 16 BRPM | BODY MASS INDEX: 42.8 KG/M2 | OXYGEN SATURATION: 96 %

## 2022-06-24 DIAGNOSIS — J45.30 MILD PERSISTENT ASTHMA, UNSPECIFIED WHETHER COMPLICATED: Primary | ICD-10-CM

## 2022-06-24 DIAGNOSIS — J01.00 ACUTE NON-RECURRENT MAXILLARY SINUSITIS: ICD-10-CM

## 2022-06-24 DIAGNOSIS — J30.2 SEASONAL ALLERGIES: ICD-10-CM

## 2022-06-24 DIAGNOSIS — K21.9 GASTROESOPHAGEAL REFLUX DISEASE WITHOUT ESOPHAGITIS: ICD-10-CM

## 2022-06-24 DIAGNOSIS — J45.30 MILD PERSISTENT ASTHMA WITHOUT COMPLICATION: ICD-10-CM

## 2022-06-24 PROCEDURE — 99214 OFFICE O/P EST MOD 30 MIN: CPT | Performed by: FAMILY MEDICINE

## 2022-06-24 RX ORDER — BUDESONIDE AND FORMOTEROL FUMARATE DIHYDRATE 160; 4.5 UG/1; UG/1
2 AEROSOL RESPIRATORY (INHALATION) 2 TIMES DAILY
Qty: 30.6 G | Refills: 1 | Status: SHIPPED | OUTPATIENT
Start: 2022-06-24

## 2022-06-24 RX ORDER — ALBUTEROL SULFATE 90 UG/1
2 AEROSOL, METERED RESPIRATORY (INHALATION) EVERY 4 HOURS PRN
Qty: 18 G | Refills: 2 | Status: SHIPPED | OUTPATIENT
Start: 2022-06-24

## 2022-06-24 RX ORDER — ALBUTEROL SULFATE 2.5 MG/3ML
2.5 SOLUTION RESPIRATORY (INHALATION) EVERY 6 HOURS PRN
Qty: 75 ML | Refills: 3 | Status: SHIPPED | OUTPATIENT
Start: 2022-06-24

## 2022-06-24 RX ORDER — AMOXICILLIN 500 MG/1
1000 CAPSULE ORAL EVERY 12 HOURS SCHEDULED
Qty: 28 CAPSULE | Refills: 0 | Status: SHIPPED | OUTPATIENT
Start: 2022-06-24 | End: 2022-07-01

## 2022-06-24 RX ORDER — FEXOFENADINE HCL AND PSEUDOEPHEDRINE HCI 180; 240 MG/1; MG/1
1 TABLET, EXTENDED RELEASE ORAL DAILY
Qty: 90 TABLET | Refills: 1 | Status: SHIPPED | OUTPATIENT
Start: 2022-06-24

## 2022-06-24 NOTE — PROGRESS NOTES
Assessment/Plan:  Previous notes and labs reviewed including CMP C-reactive protein CBC sed rate  Chest x-ray normal in November 2022  Patient will continue with current regimen for asthma allergies  Refills given at this time  Continue current regimen for GERD  Patient use amoxicillin for sinusitis  Other guidance given  Patient will follow-up in 6 months or as needed       Diagnoses and all orders for this visit:    Mild persistent asthma, unspecified whether complicated  -     albuterol (2 5 mg/3 mL) 0 083 % nebulizer solution; Take 3 mL (2 5 mg total) by nebulization every 6 (six) hours as needed for wheezing or shortness of breath  -     budesonide-formoterol (SYMBICORT) 160-4 5 mcg/act inhaler; Inhale 2 puffs 2 (two) times a day Rinse mouth after use  Mild persistent asthma without complication  -     albuterol (PROVENTIL HFA,VENTOLIN HFA) 90 mcg/act inhaler; Inhale 2 puffs every 4 (four) hours as needed (as needed)    Seasonal allergies  -     fexofenadine-pseudoephedrine (Allegra-D Allergy & Congestion) 180-240 MG per 24 hr tablet; Take 1 tablet by mouth daily    Acute non-recurrent maxillary sinusitis  -     amoxicillin (AMOXIL) 500 mg capsule; Take 2 capsules (1,000 mg total) by mouth every 12 (twelve) hours for 7 days    Gastroesophageal reflux disease without esophagitis            Subjective:        Patient ID: Rubén Murray is a 29 y o  female  Patient is here to follow-up on persistent asthma, allergies  Patient has had chronic cough over the past month or so  Patient with post nasal drip  Patient with green nasal discharge  Patient did have a fever which is improved  Patient has notice wheezing at this time  Patient was stable from asthmatic standpoint prior to this  No problems with urination or defecation  Allergies were stable prior to this but then worsened over the past month  Patient is taking medications per routine  No reflux symptoms noted          The following portions of the patient's history were reviewed and updated as appropriate: allergies, current medications, past family history, past medical history, past social history, past surgical history and problem list       Review of Systems   Constitutional: Negative  HENT: Positive for congestion, postnasal drip and rhinorrhea  Eyes: Negative  Respiratory: Positive for cough  Cardiovascular: Negative  Gastrointestinal: Negative  Endocrine: Negative  Genitourinary: Negative  Musculoskeletal: Negative  Skin: Negative  Allergic/Immunologic: Negative  Neurological: Negative  Hematological: Negative  Psychiatric/Behavioral: Negative  Objective:      BMI Counseling: Body mass index is 42 58 kg/m²  The BMI is above normal  Nutrition recommendations include consuming healthier snacks  Exercise recommendations include moderate physical activity 150 minutes/week  Rationale for BMI follow-up plan is due to patient being overweight or obese  /74 (BP Location: Right arm, Patient Position: Sitting, Cuff Size: Adult)   Pulse 92   Temp 97 7 °F (36 5 °C) (Tympanic)   Resp 16   Ht 5' (1 524 m)   Wt 98 9 kg (218 lb)   SpO2 96%   BMI 42 58 kg/m²          Physical Exam  Vitals and nursing note reviewed  Constitutional:       General: She is not in acute distress  Appearance: Normal appearance  She is not ill-appearing, toxic-appearing or diaphoretic  HENT:      Head: Normocephalic and atraumatic  Right Ear: Tympanic membrane, ear canal and external ear normal  There is no impacted cerumen  Left Ear: Tympanic membrane, ear canal and external ear normal  There is no impacted cerumen  Nose: Nose normal  No congestion or rhinorrhea  Mouth/Throat:      Mouth: Mucous membranes are moist       Pharynx: Oropharyngeal exudate present  No posterior oropharyngeal erythema  Eyes:      General: No scleral icterus  Right eye: No discharge  Left eye: No discharge  Extraocular Movements: Extraocular movements intact  Conjunctiva/sclera: Conjunctivae normal       Pupils: Pupils are equal, round, and reactive to light  Neck:      Vascular: No carotid bruit  Cardiovascular:      Rate and Rhythm: Normal rate and regular rhythm  Pulses: Normal pulses  Heart sounds: Normal heart sounds  No murmur heard  No friction rub  No gallop  Pulmonary:      Effort: Pulmonary effort is normal  No respiratory distress  Breath sounds: Normal breath sounds  No stridor  No wheezing, rhonchi or rales  Chest:      Chest wall: No tenderness  Musculoskeletal:         General: No swelling, tenderness, deformity or signs of injury  Normal range of motion  Cervical back: Normal range of motion and neck supple  No rigidity  No muscular tenderness  Right lower leg: No edema  Left lower leg: No edema  Lymphadenopathy:      Cervical: No cervical adenopathy  Skin:     General: Skin is warm and dry  Capillary Refill: Capillary refill takes less than 2 seconds  Coloration: Skin is not jaundiced  Findings: No bruising, erythema, lesion or rash  Neurological:      Mental Status: She is alert and oriented to person, place, and time  Mental status is at baseline  Cranial Nerves: No cranial nerve deficit  Sensory: No sensory deficit  Motor: No weakness  Coordination: Coordination normal       Gait: Gait normal    Psychiatric:         Mood and Affect: Mood normal          Behavior: Behavior normal          Thought Content:  Thought content normal          Judgment: Judgment normal

## 2022-09-08 DIAGNOSIS — J30.2 SEASONAL ALLERGIES: ICD-10-CM

## 2022-09-08 RX ORDER — FEXOFENADINE HCL AND PSEUDOEPHEDRINE HCI 180; 240 MG/1; MG/1
1 TABLET, EXTENDED RELEASE ORAL DAILY
Qty: 90 TABLET | Refills: 1 | Status: SHIPPED | OUTPATIENT
Start: 2022-09-08

## 2022-10-11 PROBLEM — J01.00 ACUTE NON-RECURRENT MAXILLARY SINUSITIS: Status: RESOLVED | Noted: 2022-06-24 | Resolved: 2022-10-11

## 2022-10-12 PROBLEM — Z00.00 WELL ADULT EXAM: Status: RESOLVED | Noted: 2021-11-15 | Resolved: 2022-10-12

## 2023-04-03 ENCOUNTER — HOSPITAL ENCOUNTER (EMERGENCY)
Facility: HOSPITAL | Age: 38
Discharge: HOME | End: 2023-04-03
Attending: EMERGENCY MEDICINE

## 2023-04-03 VITALS
HEIGHT: 60 IN | BODY MASS INDEX: 40.69 KG/M2 | SYSTOLIC BLOOD PRESSURE: 118 MMHG | WEIGHT: 207.23 LBS | RESPIRATION RATE: 15 BRPM | OXYGEN SATURATION: 98 % | TEMPERATURE: 98.2 F | DIASTOLIC BLOOD PRESSURE: 74 MMHG | HEART RATE: 83 BPM

## 2023-04-03 DIAGNOSIS — R11.0 NAUSEA: ICD-10-CM

## 2023-04-03 DIAGNOSIS — R07.9 CHEST PAIN WITH LOW RISK FOR CARDIAC ETIOLOGY: Primary | ICD-10-CM

## 2023-04-03 LAB
ALBUMIN SERPL BCP-MCNC: 4 G/DL (ref 3.5–5)
ALP SERPL-CCNC: 76 U/L (ref 34–104)
ALT SERPL W P-5'-P-CCNC: 12 U/L (ref 7–52)
ANION GAP SERPL CALCULATED.3IONS-SCNC: 5 MMOL/L (ref 4–13)
AST SERPL W P-5'-P-CCNC: 12 U/L (ref 13–39)
ATRIAL RATE: 83 BPM
BASOPHILS # BLD AUTO: 0.03 THOUSANDS/ÂΜL (ref 0–0.1)
BASOPHILS NFR BLD AUTO: 0 % (ref 0–1)
BILIRUB SERPL-MCNC: 0.26 MG/DL (ref 0.2–1)
BUN SERPL-MCNC: 10 MG/DL (ref 5–25)
CALCIUM SERPL-MCNC: 8.6 MG/DL (ref 8.4–10.2)
CARDIAC TROPONIN I PNL SERPL HS: <2 NG/L
CHLORIDE SERPL-SCNC: 106 MMOL/L (ref 96–108)
CO2 SERPL-SCNC: 25 MMOL/L (ref 21–32)
CREAT SERPL-MCNC: 0.67 MG/DL (ref 0.6–1.3)
EOSINOPHIL # BLD AUTO: 0.09 THOUSAND/ÂΜL (ref 0–0.61)
EOSINOPHIL NFR BLD AUTO: 1 % (ref 0–6)
ERYTHROCYTE [DISTWIDTH] IN BLOOD BY AUTOMATED COUNT: 13.9 % (ref 11.6–15.1)
EXT PREGNANCY TEST URINE: NEGATIVE
EXT. CONTROL: NORMAL
GFR SERPL CREATININE-BSD FRML MDRD: 113 ML/MIN/1.73SQ M
GLUCOSE SERPL-MCNC: 102 MG/DL (ref 65–140)
HCT VFR BLD AUTO: 40.3 % (ref 34.8–46.1)
HGB BLD-MCNC: 12.7 G/DL (ref 11.5–15.4)
IMM GRANULOCYTES # BLD AUTO: 0.03 THOUSAND/UL (ref 0–0.2)
IMM GRANULOCYTES NFR BLD AUTO: 0 % (ref 0–2)
LIPASE SERPL-CCNC: 29 U/L (ref 11–82)
LYMPHOCYTES # BLD AUTO: 2.35 THOUSANDS/ÂΜL (ref 0.6–4.47)
LYMPHOCYTES NFR BLD AUTO: 32 % (ref 14–44)
MCH RBC QN AUTO: 28.3 PG (ref 26.8–34.3)
MCHC RBC AUTO-ENTMCNC: 31.5 G/DL (ref 31.4–37.4)
MCV RBC AUTO: 90 FL (ref 82–98)
MONOCYTES # BLD AUTO: 0.54 THOUSAND/ÂΜL (ref 0.17–1.22)
MONOCYTES NFR BLD AUTO: 7 % (ref 4–12)
NEUTROPHILS # BLD AUTO: 4.22 THOUSANDS/ÂΜL (ref 1.85–7.62)
NEUTS SEG NFR BLD AUTO: 60 % (ref 43–75)
NRBC BLD AUTO-RTO: 0 /100 WBCS
P AXIS: 37 DEGREES
PLATELET # BLD AUTO: 339 THOUSANDS/UL (ref 149–390)
PMV BLD AUTO: 9.8 FL (ref 8.9–12.7)
POTASSIUM SERPL-SCNC: 4.2 MMOL/L (ref 3.5–5.3)
PR INTERVAL: 118 MS
PROT SERPL-MCNC: 7.1 G/DL (ref 6.4–8.4)
QRS AXIS: 30 DEGREES
QRSD INTERVAL: 86 MS
QT INTERVAL: 374 MS
QTC INTERVAL: 439 MS
RBC # BLD AUTO: 4.48 MILLION/UL (ref 3.81–5.12)
SODIUM SERPL-SCNC: 136 MMOL/L (ref 135–147)
T WAVE AXIS: 21 DEGREES
VENTRICULAR RATE: 83 BPM
WBC # BLD AUTO: 7.26 THOUSAND/UL (ref 4.31–10.16)

## 2023-04-03 RX ORDER — KETOROLAC TROMETHAMINE 30 MG/ML
15 INJECTION, SOLUTION INTRAMUSCULAR; INTRAVENOUS ONCE
Status: COMPLETED | OUTPATIENT
Start: 2023-04-03 | End: 2023-04-03

## 2023-04-03 RX ORDER — ONDANSETRON 4 MG/1
4 TABLET, ORALLY DISINTEGRATING ORAL EVERY 6 HOURS PRN
Qty: 8 TABLET | Refills: 0 | Status: SHIPPED | OUTPATIENT
Start: 2023-04-03

## 2023-04-03 RX ORDER — ONDANSETRON 2 MG/ML
4 INJECTION INTRAMUSCULAR; INTRAVENOUS ONCE
Status: COMPLETED | OUTPATIENT
Start: 2023-04-03 | End: 2023-04-03

## 2023-04-03 RX ORDER — PANTOPRAZOLE SODIUM 40 MG/10ML
40 INJECTION, POWDER, LYOPHILIZED, FOR SOLUTION INTRAVENOUS ONCE
Status: COMPLETED | OUTPATIENT
Start: 2023-04-03 | End: 2023-04-03

## 2023-04-03 RX ADMIN — PANTOPRAZOLE SODIUM 40 MG: 40 INJECTION, POWDER, FOR SOLUTION INTRAVENOUS at 09:53

## 2023-04-03 RX ADMIN — KETOROLAC TROMETHAMINE 15 MG: 30 INJECTION, SOLUTION INTRAMUSCULAR at 09:55

## 2023-04-03 RX ADMIN — ONDANSETRON 4 MG: 2 INJECTION INTRAMUSCULAR; INTRAVENOUS at 09:50

## 2023-04-03 NOTE — DISCHARGE INSTRUCTIONS
ED workup is reassuring with nothing requiring intervention  No specific cause for atypical discomfort, but not consistent with cardiac ischemia  Treat pain with OTC medication as needed  I have prescribed nausea medication  Follow up for persistent discomfort, or return to ED for new or concerning features

## 2023-04-03 NOTE — ED PROVIDER NOTES
History  Chief Complaint   Patient presents with   • Chest Pain     Pt reports chest pain began 3 days ago, now having nausea, dry heaves, diarrhea, and headache     39 yo F with RA, asthma, c/o onset of chest discomfort, left para sternal, waxing and waning, on 4/2/23, after bowling, associated with some nausea, at the time, no shortness of breath, and dissipated eventually, then 4/2 she said she just felt fatigued, mildy nauseated, and rested for the day  She does not recurrence of the chest discomfort, this morning she feels more nausea, on the verge of vomiting, but hasn't  Chest pain is currently resolved, but was mild again this morning  She has been eating and drinking since feeling sick over the last days  She has had similar symptoms in the past         History provided by:  Patient  Chest Pain      Prior to Admission Medications   Prescriptions Last Dose Informant Patient Reported? Taking? albuterol (2 5 mg/3 mL) 0 083 % nebulizer solution   No No   Sig: Take 3 mL (2 5 mg total) by nebulization every 6 (six) hours as needed for wheezing or shortness of breath   albuterol (PROVENTIL HFA,VENTOLIN HFA) 90 mcg/act inhaler   No No   Sig: Inhale 2 puffs every 4 (four) hours as needed (as needed)   budesonide-formoterol (SYMBICORT) 160-4 5 mcg/act inhaler   No No   Sig: Inhale 2 puffs 2 (two) times a day Rinse mouth after use  celecoxib (CeleBREX) 200 mg capsule   No No   Sig: Take 1 capsule (200 mg total) by mouth in the morning and 1 capsule (200 mg total) in the evening     cyclobenzaprine (FLEXERIL) 5 mg tablet   No No   Sig: Take 1 tablet (5 mg total) by mouth daily at bedtime   ergocalciferol (VITAMIN D2) 50,000 units   No No   Sig: TAKE 1 CAPSULE BY MOUTH ONE TIME PER WEEK   fexofenadine-pseudoephedrine (Allegra-D Allergy & Congestion) 180-240 MG per 24 hr tablet   No No   Sig: Take 1 tablet by mouth daily   hydroxychloroquine (PLAQUENIL) 200 mg tablet   No No   Sig: Take 1 tablet (200 mg total) by mouth in the morning and 1 tablet (200 mg total) in the evening  meclizine (ANTIVERT) 25 mg tablet   No No   Sig: Take 1 tablet (25 mg total) by mouth 3 (three) times a day as needed for dizziness   omeprazole (PriLOSEC) 40 MG capsule   No No   Sig: Take 1 capsule (40 mg total) by mouth daily   predniSONE 5 mg tablet   No No   Sig: Take 4 tabs all at once in the morning x7 days, then 3 tabs x7 days, then 2 tabs x7 days, then 1 tab x7 days, then stop     Patient not taking: Reported on 5/13/2022      Facility-Administered Medications: None       Past Medical History:   Diagnosis Date   • Allergic    • Anxiety    • Asthma    • BRCA1 negative    • 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    • Depression    • GERD (gastroesophageal reflux disease)    • Obesity    • Scoliosis    • Visual impairment        Past Surgical History:   Procedure Laterality Date   • HERNIA REPAIR         Family History   Problem Relation Age of Onset   • Thyroid disease Mother    • Arthritis Mother    • Diabetes Mother    • Depression Father    • Arthritis Father    • No Known Problems Sister    • No Known Problems Brother    • No Known Problems Son    • Coronary artery disease Maternal Grandmother    • Hyperlipidemia Maternal Grandmother    • Thyroid disease Maternal Grandmother    • Asthma Maternal Grandmother    • Arthritis Maternal Grandmother    • Vision loss Maternal Grandmother    • Breast cancer Maternal Grandmother         age unknown   • Diabetes Maternal Grandmother    • Hyperlipidemia Maternal Grandfather    • Thyroid disease Maternal Grandfather    • Diabetes Maternal Grandfather    • Ovarian cancer Maternal Aunt         46s   • Diabetes Maternal Aunt    • Diabetes Maternal Uncle    • No Known Problems Daughter    • No Known Problems Paternal Grandmother    • No Known Problems Paternal Grandfather    • Colon cancer Maternal Aunt    • Diabetes Maternal Aunt    • Sickle cell anemia Paternal Aunt    • Sickle cell anemia Paternal Aunt    • No Known Problems Paternal Aunt    • No Known Problems Paternal Aunt    • No Known Problems Paternal Aunt    • No Known Problems Paternal Aunt    • Diabetes Maternal Uncle    • No Known Problems Paternal Uncle    • No Known Problems Paternal Uncle    • Sickle cell anemia Paternal Uncle      I have reviewed and agree with the history as documented  E-Cigarette/Vaping   • E-Cigarette Use Never User      E-Cigarette/Vaping Substances   • Nicotine No    • THC No    • CBD No    • Flavoring No    • Other No    • Unknown No      Social History     Tobacco Use   • Smoking status: Former     Packs/day: 0 50     Types: Cigarettes     Quit date:      Years since quittin 2   • Smokeless tobacco: Never   Vaping Use   • Vaping Use: Never used   Substance Use Topics   • Alcohol use: Yes     Comment: occasionally   • Drug use: Never       Review of Systems   Cardiovascular: Positive for chest pain  Physical Exam  Physical Exam  Vitals and nursing note reviewed  Constitutional:       Appearance: She is well-developed  She is not toxic-appearing or diaphoretic  HENT:      Head: Normocephalic and atraumatic  Right Ear: Tympanic membrane and external ear normal       Left Ear: Tympanic membrane and external ear normal       Nose: Nose normal    Eyes:      Conjunctiva/sclera: Conjunctivae normal       Pupils: Pupils are equal, round, and reactive to light  Neck:      Meningeal: Brudzinski's sign and Kernig's sign absent  Cardiovascular:      Rate and Rhythm: Normal rate and regular rhythm  Pulses: Normal pulses  Heart sounds: Normal heart sounds  No murmur heard  Pulmonary:      Effort: Pulmonary effort is normal  No tachypnea or respiratory distress  Breath sounds: Normal breath sounds  No wheezing  Abdominal:      General: Bowel sounds are normal  There is no distension  Palpations: Abdomen is soft  Abdomen is not rigid  Tenderness: There is no abdominal tenderness   There is no guarding or rebound  Musculoskeletal:         General: Normal range of motion  Cervical back: Full passive range of motion without pain, normal range of motion and neck supple  Right lower leg: No swelling  Left lower leg: No swelling  Lymphadenopathy:      Cervical: No cervical adenopathy  Skin:     General: Skin is warm and dry  Capillary Refill: Capillary refill takes less than 2 seconds  Coloration: Skin is not pale  Findings: No rash  Neurological:      Mental Status: She is alert and oriented to person, place, and time  GCS: GCS eye subscore is 4  GCS verbal subscore is 5  GCS motor subscore is 6  Cranial Nerves: No cranial nerve deficit  Sensory: No sensory deficit  Coordination: Coordination normal       Gait: Gait normal       Deep Tendon Reflexes: Reflexes are normal and symmetric  Psychiatric:         Speech: Speech normal          Behavior: Behavior normal          Thought Content:  Thought content normal          Judgment: Judgment normal          Vital Signs  ED Triage Vitals   Temperature Pulse Respirations Blood Pressure SpO2   04/03/23 0957 04/03/23 0904 04/03/23 0904 04/03/23 0904 04/03/23 0904   98 2 °F (36 8 °C) 83 15 118/74 98 %      Temp src Heart Rate Source Patient Position - Orthostatic VS BP Location FiO2 (%)   -- -- -- -- --             Pain Score       04/03/23 0904       No Pain           Vitals:    04/03/23 0904   BP: 118/74   Pulse: 83         Visual Acuity      ED Medications  Medications   ketorolac (TORADOL) injection 15 mg (15 mg Intravenous Given 4/3/23 0955)   pantoprazole (PROTONIX) injection 40 mg (40 mg Intravenous Given 4/3/23 0953)   ondansetron (ZOFRAN) injection 4 mg (4 mg Intravenous Given 4/3/23 0950)       Diagnostic Studies  Results Reviewed     Procedure Component Value Units Date/Time    Lipase [[id number]]  (Normal) Collected: 04/03/23 0910    Lab Status: Final result Specimen: Blood from Arm, Left Updated: 04/03/23 1037 Lipase 29 u/L     Comprehensive metabolic panel [[id number]]  (Abnormal) Collected: 04/03/23 0910    Lab Status: Final result Specimen: Blood from Arm, Left Updated: 04/03/23 1037     Sodium 136 mmol/L      Potassium 4 2 mmol/L      Chloride 106 mmol/L      CO2 25 mmol/L      ANION GAP 5 mmol/L      BUN 10 mg/dL      Creatinine 0 67 mg/dL      Glucose 102 mg/dL      Calcium 8 6 mg/dL      AST 12 U/L      ALT 12 U/L      Alkaline Phosphatase 76 U/L      Total Protein 7 1 g/dL      Albumin 4 0 g/dL      Total Bilirubin 0 26 mg/dL      eGFR 113 ml/min/1 73sq m     Narrative:      National Kid[employer] guidelines for Chronic Kidney Disease (CKD):   •  Stage 1 with normal or high GFR (GFR > 90 mL/min/1 73 square meters)  •  Stage 2 Mild CKD (GFR = 60-89 mL/min/1 73 square meters)  •  Stage 3A Moderate CKD (GFR = 45-59 mL/min/1 73 square meters)  •  Stage 3B Moderate CKD (GFR = 30-44 mL/min/1 73 square meters)  •  Stage 4 Severe CKD (GFR = 15-29 mL/min/1 73 square meters)  •  Stage 5 End Stage CKD (GFR <15 mL/min/1 73 square meters)  Note: GFR calculation is accurate only with a steady state creatinine    HS Troponin 0hr (reflex protocol) [[id number]]  (Normal) Collected: 04/03/23 0910    Lab Status: Final result Specimen: Blood from Arm, Left Updated: 04/03/23 1017     hs TnI 0hr <2 ng/L     POCT pregnancy, urine [[id number]]  (Normal) Resulted: 04/03/23 0954    Lab Status: Final result Updated: 04/03/23 0954     EXT Preg Test, Ur Negative     Control Valid    CBC and differential [[id number]] Collected: 04/03/23 0910    Lab Status: Final result Specimen: Blood from Arm, Left Updated: 04/03/23 0947     WBC 7 26 Thousand/uL      RBC 4 48 Million/uL      Hemoglobin 12 7 g/dL      Hematocrit 40 3 %      MCV 90 fL      MCH 28 3 pg      MCHC 31 5 g/dL      RDW 13 9 %      MPV 9 8 fL      Platelets 733 Thousands/uL      nRBC 0 /100 WBCs      Neutrophils Relative 60 %      Immat GRANS % 0 %      Lymphocytes Relative 32 %      Monocytes Relative 7 %      Eosinophils Relative 1 %      Basophils Relative 0 %      Neutrophils Absolute 4 22 Thousands/µL      Immature Grans Absolute 0 03 Thousand/uL      Lymphocytes Absolute 2 35 Thousands/µL      Monocytes Absolute 0 54 Thousand/µL      Eosinophils Absolute 0 09 Thousand/µL      Basophils Absolute 0 03 Thousands/µL                  No orders to display              Procedures  ECG 12 Lead Documentation Only    Date/Time: 4/3/2023 9:15 AM  Performed by: Zainab Lepe MD  Authorized by: Zainab Lepe MD     Rate:     ECG rate:  83  Rhythm:     Rhythm: sinus rhythm    Ectopy:     Ectopy: none    QRS:     QRS axis:  Normal    QRS intervals:  Normal  Conduction:     Conduction: normal    ST segments:     ST segments:  Normal  T waves:     T waves: normal               ED Course  ED Course as of 04/03/23 1302   Mon Apr 03, 2023   1031 hs TnI 0hr: <2  I am considering this adequate to confirm no cardiac ischemia  Chest discomfort is atypical and has been featured over the past 2 days in a 4 hour timeframe  1107 Reviewed results with patient at bedside and updated on the plan  She is not experiencing any ongoing symptoms  ED workup is reassuring with nothing requiring intervention  No specific cause for atypical discomfort, but not c/w cardiac ischemia                 HEART Risk Score    Flowsheet Row Most Recent Value   Heart Score Risk Calculator    History 0 Filed at: 04/03/2023 1033   ECG 0 Filed at: 04/03/2023 1033   Age 0 Filed at: 04/03/2023 1033   Risk Factors 0 Filed at: 04/03/2023 1033   Troponin 0 Filed at: 04/03/2023 1033   HEART Score 0 Filed at: 04/03/2023 1033                                      MDM    Disposition  Final diagnoses:   Chest pain with low risk for cardiac etiology   Nausea     Time reflects when diagnosis was documented in both MDM as applicable and the Disposition within this note     Time User Action Codes Description Comment 4/3/2023 11:08 AM Juan Rip GUTIERRES Add [R07 9] Chest pain with low risk for cardiac etiology     4/3/2023 11:08 AM Dina Heller Add [R11 0] Nausea       ED Disposition     ED Disposition   Discharge    Condition   Good    Date/Time   Mon Apr 3, 2023 11:08 AM    Comment   Hiren Anderson discharge to home/self care                 Follow-up Information     Follow up With Specialties Details Why [address], DO Family Medicine Call  If symptoms worsen Adeel Moreau  [phone]            Discharge Medication List as of 4/3/2023 11:10 AM      START taking these medications    Details   ondansetron (ZOFRAN-ODT) 4 mg disintegrating tablet Take 1 tablet (4 mg total) by mouth every 6 (six) hours as needed for nausea or vomiting, Starting Mon 4/3/2023, Normal         CONTINUE these medications which have NOT CHANGED    Details   albuterol (2 5 mg/3 mL) 0 083 % nebulizer solution Take 3 mL (2 5 mg total) by nebulization every 6 (six) hours as needed for wheezing or shortness of breath, Starting Fri 6/24/2022, Normal      albuterol (PROVENTIL HFA,VENTOLIN HFA) 90 mcg/act inhaler Inhale 2 puffs every 4 (four) hours as needed (as needed), Starting Fri 6/24/2022, Normal      budesonide-formoterol (SYMBICORT) 160-4 5 mcg/act inhaler Inhale 2 puffs 2 (two) times a day Rinse mouth after use , Starting Fri 6/24/2022, Normal      celecoxib (CeleBREX) 200 mg capsule Take 1 capsule (200 mg total) by mouth in the morning and 1 capsule (200 mg total) in the evening , Starting Fri 5/13/2022, Normal      cyclobenzaprine (FLEXERIL) 5 mg tablet Take 1 tablet (5 mg total) by mouth daily at bedtime, Starting Fri 5/13/2022, Normal      ergocalciferol (VITAMIN D2) 50,000 units TAKE 1 CAPSULE BY MOUTH ONE TIME PER WEEK, Normal      fexofenadine-pseudoephedrine (Allegra-D Allergy & Congestion) 180-240 MG per 24 hr tablet Take 1 tablet by mouth daily, Starting Thu 9/8/2022, Normal hydroxychloroquine (PLAQUENIL) 200 mg tablet Take 1 tablet (200 mg total) by mouth in the morning and 1 tablet (200 mg total) in the evening , Starting Sat 5/14/2022, Until Thu 11/10/2022, Normal      meclizine (ANTIVERT) 25 mg tablet Take 1 tablet (25 mg total) by mouth 3 (three) times a day as needed for dizziness, Starting Wed 7/28/2021, Normal      omeprazole (PriLOSEC) 40 MG capsule Take 1 capsule (40 mg total) by mouth daily, Starting Wed 7/28/2021, Normal         STOP taking these medications       predniSONE 5 mg tablet Comments:   Reason for Stopping:               No discharge procedures on file      PDMP Review       Value Time User    PDMP Reviewed  Yes 8/3/2021  8:03 AM Bree Patel DO          ED Provider  Electronically Signed by           Sheila Blanc MD  04/03/23 1219

## 2023-05-15 ENCOUNTER — OFFICE VISIT (OUTPATIENT)
Dept: FAMILY MEDICINE CLINIC | Facility: CLINIC | Age: 38
End: 2023-05-15

## 2023-05-15 VITALS
DIASTOLIC BLOOD PRESSURE: 74 MMHG | OXYGEN SATURATION: 94 % | BODY MASS INDEX: 41.23 KG/M2 | WEIGHT: 210 LBS | SYSTOLIC BLOOD PRESSURE: 112 MMHG | HEART RATE: 94 BPM | HEIGHT: 60 IN | TEMPERATURE: 97.8 F

## 2023-05-15 DIAGNOSIS — M79.641 BILATERAL HAND PAIN: ICD-10-CM

## 2023-05-15 DIAGNOSIS — J45.30 MILD PERSISTENT ASTHMA, UNSPECIFIED WHETHER COMPLICATED: Primary | ICD-10-CM

## 2023-05-15 DIAGNOSIS — Z00.00 WELL ADULT EXAM: ICD-10-CM

## 2023-05-15 DIAGNOSIS — E55.9 VITAMIN D DEFICIENCY: ICD-10-CM

## 2023-05-15 DIAGNOSIS — M79.642 BILATERAL HAND PAIN: ICD-10-CM

## 2023-05-15 DIAGNOSIS — J30.2 SEASONAL ALLERGIES: ICD-10-CM

## 2023-05-15 DIAGNOSIS — K21.9 GASTROESOPHAGEAL REFLUX DISEASE WITHOUT ESOPHAGITIS: ICD-10-CM

## 2023-05-15 RX ORDER — FEXOFENADINE HCL AND PSEUDOEPHEDRINE HCI 180; 240 MG/1; MG/1
1 TABLET, EXTENDED RELEASE ORAL DAILY
Qty: 90 TABLET | Refills: 1 | Status: SHIPPED | OUTPATIENT
Start: 2023-05-15

## 2023-05-15 RX ORDER — OMEPRAZOLE 40 MG/1
40 CAPSULE, DELAYED RELEASE ORAL DAILY
Qty: 90 CAPSULE | Refills: 1 | Status: SHIPPED | OUTPATIENT
Start: 2023-05-15

## 2023-05-15 RX ORDER — CELECOXIB 200 MG/1
200 CAPSULE ORAL 2 TIMES DAILY
Qty: 60 CAPSULE | Refills: 6 | Status: SHIPPED | OUTPATIENT
Start: 2023-05-15

## 2023-05-15 RX ORDER — ALBUTEROL SULFATE 2.5 MG/3ML
2.5 SOLUTION RESPIRATORY (INHALATION) EVERY 6 HOURS PRN
Qty: 75 ML | Refills: 3 | Status: SHIPPED | OUTPATIENT
Start: 2023-05-15

## 2023-05-15 RX ORDER — ERGOCALCIFEROL 1.25 MG/1
50000 CAPSULE ORAL WEEKLY
Qty: 12 CAPSULE | Refills: 1 | Status: SHIPPED | OUTPATIENT
Start: 2023-05-15

## 2023-05-15 RX ORDER — BUDESONIDE AND FORMOTEROL FUMARATE DIHYDRATE 160; 4.5 UG/1; UG/1
2 AEROSOL RESPIRATORY (INHALATION) 2 TIMES DAILY
Qty: 30.6 G | Refills: 1 | Status: SHIPPED | OUTPATIENT
Start: 2023-05-15

## 2023-05-15 NOTE — PROGRESS NOTES
Assessment/Plan: Vaccines up-to-date  Labs up-to-date  Patient will follow-up with gynecology appropriately  No breast related issues  No early family history of colorectal cancer  Mother status post having heart attack  Patient will continue with current regimen for asthma as well as GERD  Refills given at this time  Other refills given for other chronic conditions  Patient will follow with rheumatology appropriately  Patient will follow-up in 6 months or as needed  Diagnoses and all orders for this visit:    Mild persistent asthma, unspecified whether complicated  -     albuterol (2 5 mg/3 mL) 0 083 % nebulizer solution; Take 3 mL (2 5 mg total) by nebulization every 6 (six) hours as needed for wheezing or shortness of breath  -     budesonide-formoterol (SYMBICORT) 160-4 5 mcg/act inhaler; Inhale 2 puffs 2 (two) times a day Rinse mouth after use  Bilateral hand pain  -     celecoxib (CeleBREX) 200 mg capsule; Take 1 capsule (200 mg total) by mouth 2 (two) times a day    Gastroesophageal reflux disease without esophagitis  -     omeprazole (PriLOSEC) 40 MG capsule; Take 1 capsule (40 mg total) by mouth daily    Seasonal allergies  -     fexofenadine-pseudoephedrine (Allegra-D Allergy & Congestion) 180-240 MG per 24 hr tablet; Take 1 tablet by mouth daily    Vitamin D deficiency  -     ergocalciferol (VITAMIN D2) 50,000 units; Take 1 capsule (50,000 Units total) by mouth once a week    Well adult exam            Subjective:        Patient ID: Daniel Parents is a 40 y o  female  Patient is here to follow-up on asthma and GERD and also here for well visit  Patient does get some reflux symptoms during the day  Patient does need to drink some soda to help with symptoms  No fevers or vomiting associated with this  No significant problems with urination or defecation  Patient with some allergic symptoms but asthma relatively stable overall  No nocturnal symptoms  Patient will need refills  Labs and vaccines reviewed  No new breast related issues  Patient up-to-date seeing gynecology  Patient having joint pains again  The following portions of the patient's history were reviewed and updated as appropriate: allergies, current medications, past family history, past medical history, past social history, past surgical history and problem list       Review of Systems   Constitutional: Negative  HENT: Negative  Eyes: Negative  Respiratory: Negative  Cardiovascular: Negative  Gastrointestinal:        Reflux symptoms   Endocrine: Negative  Genitourinary: Negative  Musculoskeletal: Positive for arthralgias  Skin: Negative  Allergic/Immunologic: Negative  Neurological: Negative  Hematological: Negative  Psychiatric/Behavioral: Negative  Objective:      BMI Counseling: Body mass index is 41 01 kg/m²  The BMI is above normal  Nutrition recommendations include consuming healthier snacks  Exercise recommendations include moderate physical activity 150 minutes/week  Rationale for BMI follow-up plan is due to patient being overweight or obese  /74 (BP Location: Left arm, Patient Position: Sitting, Cuff Size: Large)   Pulse 94   Temp 97 8 °F (36 6 °C) (Tympanic)   Ht 5' (1 524 m)   Wt 95 3 kg (210 lb)   SpO2 94%   BMI 41 01 kg/m²          Physical Exam  Vitals and nursing note reviewed  Constitutional:       General: She is not in acute distress  Appearance: Normal appearance  She is not ill-appearing, toxic-appearing or diaphoretic  HENT:      Head: Normocephalic and atraumatic  Right Ear: Tympanic membrane, ear canal and external ear normal  There is no impacted cerumen  Left Ear: Tympanic membrane, ear canal and external ear normal  There is no impacted cerumen  Nose: Nose normal  No congestion or rhinorrhea        Mouth/Throat:      Mouth: Mucous membranes are moist       Pharynx: No oropharyngeal exudate or posterior oropharyngeal erythema  Eyes:      General: No scleral icterus  Right eye: No discharge  Left eye: No discharge  Extraocular Movements: Extraocular movements intact  Conjunctiva/sclera: Conjunctivae normal       Pupils: Pupils are equal, round, and reactive to light  Neck:      Vascular: No carotid bruit  Cardiovascular:      Rate and Rhythm: Normal rate and regular rhythm  Pulses: Normal pulses  Heart sounds: Normal heart sounds  No murmur heard  No friction rub  No gallop  Pulmonary:      Effort: Pulmonary effort is normal  No respiratory distress  Breath sounds: Normal breath sounds  No stridor  No wheezing, rhonchi or rales  Chest:      Chest wall: No tenderness  Musculoskeletal:         General: No swelling, tenderness, deformity or signs of injury  Normal range of motion  Cervical back: Normal range of motion and neck supple  No rigidity  No muscular tenderness  Right lower leg: No edema  Left lower leg: No edema  Lymphadenopathy:      Cervical: No cervical adenopathy  Skin:     General: Skin is warm and dry  Capillary Refill: Capillary refill takes less than 2 seconds  Coloration: Skin is not jaundiced  Findings: No bruising, erythema, lesion or rash  Neurological:      Mental Status: She is alert and oriented to person, place, and time  Mental status is at baseline  Cranial Nerves: No cranial nerve deficit  Sensory: No sensory deficit  Motor: No weakness  Coordination: Coordination normal       Gait: Gait normal    Psychiatric:         Mood and Affect: Mood normal          Behavior: Behavior normal          Thought Content:  Thought content normal          Judgment: Judgment normal

## 2023-07-14 PROBLEM — Z00.00 WELL ADULT EXAM: Status: RESOLVED | Noted: 2023-05-15 | Resolved: 2023-07-14

## 2023-08-11 ENCOUNTER — HOSPITAL ENCOUNTER (EMERGENCY)
Facility: HOSPITAL | Age: 38
Discharge: HOME | End: 2023-08-11
Attending: EMERGENCY MEDICINE | Admitting: EMERGENCY MEDICINE
Payer: COMMERCIAL

## 2023-08-11 ENCOUNTER — APPOINTMENT (EMERGENCY)
Dept: RADIOLOGY | Facility: HOSPITAL | Age: 38
End: 2023-08-11
Payer: COMMERCIAL

## 2023-08-11 VITALS
OXYGEN SATURATION: 99 % | RESPIRATION RATE: 18 BRPM | SYSTOLIC BLOOD PRESSURE: 146 MMHG | DIASTOLIC BLOOD PRESSURE: 73 MMHG | HEART RATE: 103 BPM | TEMPERATURE: 98.7 F

## 2023-08-11 DIAGNOSIS — S92.251A: Primary | ICD-10-CM

## 2023-08-11 DIAGNOSIS — S93.401A RIGHT ANKLE SPRAIN: ICD-10-CM

## 2023-08-11 PROCEDURE — 29515 APPLICATION SHORT LEG SPLINT: CPT | Performed by: PHYSICIAN ASSISTANT

## 2023-08-11 PROCEDURE — NC001 PR NO CHARGE: Performed by: PHYSICIAN ASSISTANT

## 2023-08-11 PROCEDURE — 73630 X-RAY EXAM OF FOOT: CPT

## 2023-08-11 PROCEDURE — 73610 X-RAY EXAM OF ANKLE: CPT

## 2023-08-11 PROCEDURE — 99283 EMERGENCY DEPT VISIT LOW MDM: CPT

## 2023-08-11 PROCEDURE — 99284 EMERGENCY DEPT VISIT MOD MDM: CPT | Performed by: PHYSICIAN ASSISTANT

## 2023-08-11 PROCEDURE — 96372 THER/PROPH/DIAG INJ SC/IM: CPT

## 2023-08-11 RX ORDER — KETOROLAC TROMETHAMINE 30 MG/ML
15 INJECTION, SOLUTION INTRAMUSCULAR; INTRAVENOUS ONCE
Status: COMPLETED | OUTPATIENT
Start: 2023-08-11 | End: 2023-08-11

## 2023-08-11 RX ORDER — ACETAMINOPHEN 325 MG/1
975 TABLET ORAL ONCE
Status: COMPLETED | OUTPATIENT
Start: 2023-08-11 | End: 2023-08-11

## 2023-08-11 RX ADMIN — KETOROLAC TROMETHAMINE 15 MG: 30 INJECTION, SOLUTION INTRAMUSCULAR; INTRAVENOUS at 18:47

## 2023-08-11 RX ADMIN — ACETAMINOPHEN 325MG 975 MG: 325 TABLET ORAL at 18:46

## 2023-08-11 NOTE — Clinical Note
Jose Gomez was seen and treated in our emergency department on 8/11/2023. No work until cleared by Family Doctor/Orthopedics        Diagnosis:     Renee  . She may return on this date: If you have any questions or concerns, please don't hesitate to call.       Andreea Son PA-C    ______________________________           _______________          _______________  Hospital Representative                              Date                                Time

## 2023-08-11 NOTE — ED PROVIDER NOTES
History  Chief Complaint   Patient presents with   • Ankle Injury     Pt fell down approx 2 steps and felt a "crunch" to the R ankle. Patient is a 70-year-old female presenting to the ED for evaluation of a right ankle injury that occurred about 2 hours ago. 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 She states that she landed hard on her right foot and felt a "crunch" in her right ankle. She did not hit her head or lose consciousness. She reports pain over the lateral and anterior aspect of the ankle with associated swelling. She has not been able to bear full weight on the extremity since this occurred. She denies any numbness or deformity to the ankle. Prior to Admission Medications   Prescriptions Last Dose Informant Patient Reported? Taking? albuterol (2.5 mg/3 mL) 0.083 % nebulizer solution   No No   Sig: Take 3 mL (2.5 mg total) by nebulization every 6 (six) hours as needed for wheezing or shortness of breath   albuterol (PROVENTIL HFA,VENTOLIN HFA) 90 mcg/act inhaler   No No   Sig: Inhale 2 puffs every 4 (four) hours as needed (as needed)   budesonide-formoterol (SYMBICORT) 160-4.5 mcg/act inhaler   No No   Sig: Inhale 2 puffs 2 (two) times a day Rinse mouth after use.    celecoxib (CeleBREX) 200 mg capsule   No No   Sig: Take 1 capsule (200 mg total) by mouth 2 (two) times a day   cyclobenzaprine (FLEXERIL) 5 mg tablet   No No   Sig: Take 1 tablet (5 mg total) by mouth daily at bedtime   ergocalciferol (VITAMIN D2) 50,000 units   No No   Sig: Take 1 capsule (50,000 Units total) by mouth once a week   fexofenadine-pseudoephedrine (Allegra-D Allergy & Congestion) 180-240 MG per 24 hr tablet   No No   Sig: Take 1 tablet by mouth daily   omeprazole (PriLOSEC) 40 MG capsule   No No   Sig: Take 1 capsule (40 mg total) by mouth daily      Facility-Administered Medications: None       Past Medical History:   Diagnosis Date   • Allergic    • Anxiety    • Asthma    • BRCA1 negative    • 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    • Depression    • GERD (gastroesophageal reflux disease)    • Obesity    • Scoliosis    • Visual impairment        Past Surgical History:   Procedure Laterality Date   • HERNIA REPAIR         Family History   Problem Relation Age of Onset   • Thyroid disease Mother    • Arthritis Mother    • Diabetes Mother    • Heart attack Mother    • Depression Father    • Arthritis Father    • No Known Problems Sister    • No Known Problems Brother    • No Known Problems Son    • No Known Problems Daughter    • Coronary artery disease Maternal Grandmother    • Hyperlipidemia Maternal Grandmother    • Thyroid disease Maternal Grandmother    • Asthma Maternal Grandmother    • Arthritis Maternal Grandmother    • Vision loss Maternal Grandmother    • Breast cancer Maternal Grandmother         age unknown   • Diabetes Maternal Grandmother    • Hyperlipidemia Maternal Grandfather    • Thyroid disease Maternal Grandfather    • Diabetes Maternal Grandfather    • No Known Problems Paternal Grandmother    • No Known Problems Paternal Grandfather    • Ovarian cancer Maternal Aunt         46s   • Diabetes Maternal Aunt    • Colon cancer Maternal Aunt    • Diabetes Maternal Aunt    • Diabetes Maternal Uncle    • Diabetes Maternal Uncle    • Sickle cell anemia Paternal Aunt    • Sickle cell anemia Paternal Aunt    • No Known Problems Paternal Aunt    • No Known Problems Paternal Aunt    • No Known Problems Paternal Aunt    • No Known Problems Paternal Aunt    • No Known Problems Paternal Uncle    • No Known Problems Paternal Uncle    • Sickle cell anemia Paternal Uncle      I have reviewed and agree with the history as documented.     E-Cigarette/Vaping   • E-Cigarette Use Never User      E-Cigarette/Vaping Substances   • Nicotine No    • THC No    • CBD No    • Flavoring No    • Other No    • Unknown No      Social History Tobacco Use   • Smoking status: Former     Packs/day: 0.50     Types: Cigarettes     Quit date:      Years since quittin.6   • Smokeless tobacco: Never   Vaping Use   • Vaping Use: Never used   Substance Use Topics   • Alcohol use: Yes     Comment: occasionally   • Drug use: Never       Review of Systems   Constitutional: Negative for appetite change, chills, fatigue and fever. HENT: Negative for congestion, rhinorrhea and sore throat. Respiratory: Negative for cough and shortness of breath. Cardiovascular: Negative for chest pain, palpitations and leg swelling. Gastrointestinal: Negative for abdominal pain, blood in stool, constipation, diarrhea, nausea and vomiting. Genitourinary: Negative for dysuria, flank pain, frequency and hematuria. Musculoskeletal: Positive for arthralgias (right ankle pain). Negative for back pain, myalgias and neck pain. All other systems reviewed and are negative. Physical Exam  Physical Exam  Vitals and nursing note reviewed. Constitutional:       General: She is awake. Appearance: Normal appearance. She is well-developed. She is not toxic-appearing or diaphoretic. HENT:      Head: Normocephalic and atraumatic. Right Ear: External ear normal.      Left Ear: External ear normal.      Nose: Nose normal.      Mouth/Throat:      Lips: Pink. Mouth: Mucous membranes are moist.   Eyes:      General: Lids are normal. No scleral icterus. Conjunctiva/sclera: Conjunctivae normal.      Pupils: Pupils are equal, round, and reactive to light. Cardiovascular:      Pulses: Normal pulses. Radial pulses are 2+ on the right side and 2+ on the left side. Pulmonary:      Effort: Pulmonary effort is normal. No accessory muscle usage. Abdominal:      General: Abdomen is flat. There is no distension. Musculoskeletal:      Cervical back: Neck supple. Legs:    Skin:     General: Skin is warm and dry.       Capillary Refill: Capillary refill takes less than 2 seconds. Coloration: Skin is not cyanotic, jaundiced or pale. Neurological:      Mental Status: She is alert and oriented to person, place, and time. GCS: GCS eye subscore is 4. GCS verbal subscore is 5. GCS motor subscore is 6. Gait: Gait normal.   Psychiatric:         Mood and Affect: Mood normal.         Speech: Speech normal.         Behavior: Behavior is cooperative. Vital Signs  ED Triage Vitals [08/11/23 1828]   Temperature Pulse Respirations Blood Pressure SpO2   98.7 °F (37.1 °C) 103 18 146/73 99 %      Temp Source Heart Rate Source Patient Position - Orthostatic VS BP Location FiO2 (%)   Oral -- Sitting Right arm --      Pain Score       8           Vitals:    08/11/23 1828   BP: 146/73   Pulse: 103   Patient Position - Orthostatic VS: Sitting         Visual Acuity      ED Medications  Medications   ketorolac (TORADOL) injection 15 mg (15 mg Intramuscular Given 8/11/23 1847)   acetaminophen (TYLENOL) tablet 975 mg (975 mg Oral Given 8/11/23 1846)       Diagnostic Studies  Results Reviewed     None                 XR ankle 3+ views RIGHT   Final Result by Kennedi Zapata MD (08/11 1945)      Fracture deformity through the corner of what appears to represent navicular bone. Ankle appears intact. Mild lateral soft tissue swelling. Workstation performed: [id number]         XR foot 3+ views RIGHT   Final Result by Kennedi Zapata MD (08/11 1947)      Avulsion fracture of the corner of the navicular as above. Workstation performed: [id number]                    Procedures  Splint application    Date/Time: 8/11/2023 7:52 PM    Performed by: Elin Lim PA-C  Authorized by: Elin Lim PA-C  Universal Protocol:  Consent: Verbal consent obtained.   Risks and benefits: risks, benefits and alternatives were discussed  Consent given by: patient  Time out: Immediately prior to procedure a "time out" was called to verify the correct patient, procedure, equipment, support staff and site/side marked as required. Timeout called at: 8/11/2023 7:45 PM.  Patient understanding: patient states understanding of the procedure being performed  Required items: required blood products, implants, devices, and special equipment available  Patient identity confirmed: verbally with patient      Pre-procedure details:     Sensation:  Normal    Skin color:  Pink  Procedure details:     Laterality:  Right    Location:  Ankle    Splint type:  Short leg    Supplies:  Cotton padding, Ortho-Glass and elastic bandage  Post-procedure details:     Pain:  Unchanged    Sensation:  Normal    Skin color:  Pink    Patient tolerance of procedure: Tolerated well, no immediate complications             ED Course                                             Medical Decision Making  Patient is a 29-year-old female presenting to the ED for evaluation of a right ankle injury that occurred about 2 hours ago. DDX including but not limited to: sprain, strain, fracture, dislocation. X-ray shows an avulsion fracture of the navicular bone. Patient was placed in a posterior short leg splint and given crutches. She was instructed to remain nonweightbearing and follow-up with orthopedics/podiatry for further evaluation/management. She was encouraged to rest, ice and elevate the extremity and take tylenol/motrin as needed for pain. The management plan was discussed in detail with the patient at bedside and all questions were answered. Strict ED return instructions were discussed at bedside. Prior to discharge, both verbal and written instructions were provided. We discussed the signs and symptoms that should prompt the patient to return to the ED. All questions were answered and the patient was comfortable with the plan of care and discharged home.  The patient agrees to return to the Emergency Department for concerns and/or progression of illness. Avulsion fracture of navicular bone of right foot: acute illness or injury  Right ankle sprain: acute illness or injury  Amount and/or Complexity of Data Reviewed  Radiology: ordered. Risk  OTC drugs. Prescription drug management. Disposition  Final diagnoses:   Avulsion fracture of navicular bone of right foot   Right ankle sprain     Time reflects when diagnosis was documented in both MDM as applicable and the Disposition within this note     Time User Action Codes Description Comment    8/11/2023  7:31 PM [facility] Add [X84.583Y] Avulsion fracture of navicular bone of right foot     8/11/2023  7:31 PM [facility] Add [U51.098J] Right ankle sprain       ED Disposition     ED Disposition   Discharge    Condition   Stable    Date/Time   Fri Aug 11, 2023  7:31 PM    Comment   Surjit Job discharge to home/self care.                Follow-up Information     Follow up With Specialties Details Why Contact Info Additional [address] [facility] Orthopedic Surgery Schedule an appointment as soon as possible for a visit   [address] [facility] [address]  [address] [address]  [address] [place] [address], [address] [facility], [address] [place], [place], [address]   [facility] Emergency Department Emergency Medicine  If symptoms worsen [address] [address]  [address] [facility] Emergency Department, [address] [place], [place], [address]          Discharge Medication List as of 8/11/2023  7:36 PM      CONTINUE these medications which have NOT CHANGED    Details   albuterol (2.5 mg/3 mL) 0.083 % nebulizer solution Take 3 mL (2.5 mg total) by nebulization every 6 (six) hours as needed for wheezing or shortness of breath, Starting Mon 5/15/2023, Normal      albuterol (PROVENTIL HFA,VENTOLIN HFA) 90 mcg/act inhaler Inhale 2 puffs every 4 (four) hours as needed (as needed), Starting Fri 6/24/2022, Normal      budesonide-formoterol (SYMBICORT) 160-4.5 mcg/act inhaler Inhale 2 puffs 2 (two) times a day Rinse mouth after use., Starting Mon 5/15/2023, Normal      celecoxib (CeleBREX) 200 mg capsule Take 1 capsule (200 mg total) by mouth 2 (two) times a day, Starting Mon 5/15/2023, Normal      cyclobenzaprine (FLEXERIL) 5 mg tablet Take 1 tablet (5 mg total) by mouth daily at bedtime, Starting Fri 5/13/2022, Normal      ergocalciferol (VITAMIN D2) 50,000 units Take 1 capsule (50,000 Units total) by mouth once a week, Starting Mon 5/15/2023, Normal      fexofenadine-pseudoephedrine (Allegra-D Allergy & Congestion) 180-240 MG per 24 hr tablet Take 1 tablet by mouth daily, Starting Mon 5/15/2023, Normal      omeprazole (PriLOSEC) 40 MG capsule Take 1 capsule (40 mg total) by mouth daily, Starting Mon 5/15/2023, Normal                 PDMP Review       Value Time User    PDMP Reviewed  Yes 8/3/2021  8:03 AM Ana Quintanilla DO          ED Provider  Electronically Signed by           Yaya Munoz PA-C  08/11/23 2003

## 2023-08-18 ENCOUNTER — OFFICE VISIT (OUTPATIENT)
Dept: OBGYN CLINIC | Facility: MEDICAL CENTER | Age: 38
End: 2023-08-18
Payer: COMMERCIAL

## 2023-08-18 VITALS — WEIGHT: 210 LBS | BODY MASS INDEX: 41.23 KG/M2 | HEIGHT: 60 IN

## 2023-08-18 DIAGNOSIS — S92.251A: ICD-10-CM

## 2023-08-18 DIAGNOSIS — S93.401A RIGHT ANKLE SPRAIN: ICD-10-CM

## 2023-08-18 PROCEDURE — 99203 OFFICE O/P NEW LOW 30 MIN: CPT | Performed by: EMERGENCY MEDICINE

## 2023-08-18 NOTE — PATIENT INSTRUCTIONS
You may use Advil (ibuprofen) 600mg every 6 hours or at least twice per day OR Aleve (naproxen) 250-500mg every 12 hours as needed for pain and inflammation. You may also take Tylenol 500mg every 4-6 hours as needed OR max 1,000mg per dose up to 3 times per day for a total of 3,000mg per day  Check with your primary care physician to see if these medications are safe to take and to make sure they do not interfere with your other medications and medical issues. You may weight-bear as tolerated in the walking boot, and may take the boot off when at home relaxing or sleeping. As symptoms improve you may begin to ambulate out of the walking boot and may transition into a supportive shoe or sneaker with or without an ankle brace. You may perform the home exercises daily and progress as tolerated. Generally these injuries take about 4 to 8 weeks to get to the point where you feel close to 100%. You may return to athletic activities when you feel close to 100% and have near full range of motion and strength. Ankle Sprain   AMBULATORY CARE:   An ankle sprain  happens when 1 or more ligaments in your ankle joint stretch or tear. Ligaments are tough tissues that connect bones. Ligaments support your joints and keep your bones in place. Common signs and symptoms:   Trouble moving your ankle or foot    Pain when you touch or put weight on your ankle    Bruised, swollen, or misshapen ankle    Seek care immediately if:   You have severe pain in your ankle. Your foot or toes are cold or numb. Your ankle becomes more weak or unstable (wobbly). You are unable to put any weight on your ankle or foot. Your swelling has increased or returned. Call your doctor if:   Your pain does not go away, even after treatment. You have questions or concerns about your condition or care. Treatment:   Medicines:      NSAIDs , such as ibuprofen, help decrease swelling, pain, and fever.  This medicine is available with or without a doctor's order. NSAIDs can cause stomach bleeding or kidney problems in certain people. If you take blood thinner medicine, always ask your healthcare provider if NSAIDs are safe for you. Always read the medicine label and follow directions. Acetaminophen  decreases pain and fever. It is available without a doctor's order. Ask how much to take and how often to take it. Follow directions. Read the labels of all other medicines you are using to see if they also contain acetaminophen, or ask your doctor or pharmacist. Acetaminophen can cause liver damage if not taken correctly. Prescription pain medicine  may be given. Ask your healthcare provider how to take this medicine safely. Some prescription pain medicines contain acetaminophen. Do not take other medicines that contain acetaminophen without talking to your healthcare provider. Too much acetaminophen may cause liver damage. Prescription pain medicine may cause constipation. Ask your healthcare provider how to prevent or treat constipation. Surgery  may be needed to repair or replace a torn ligament if your sprain does not heal with other treatments. Your healthcare provider may use screws to attach the bones in your ankle together. The screws may help support your ankle and make it stable. Ask your healthcare provider for more information about surgery to treat your ankle sprain. Self-care:   Use support devices , such as a brace, cast, or splint, to limit your movement and protect your joint. You may need to use crutches to decrease your pain as you move around. Go to physical therapy  as directed. A physical therapist teaches you exercises to help improve movement and strength, and to decrease pain. Rest  your ankle so that it can heal. Return to normal activities as directed. Apply ice  on your ankle for 15 to 20 minutes every hour or as directed. Use an ice pack, or put crushed ice in a plastic bag.  Cover the ice pack or bag with a towel before you put it on your injury. Ice helps prevent tissue damage and decreases swelling and pain. Compress  your ankle. Ask if you should wrap an elastic bandage around your injured ligament. An elastic bandage provides support and helps decrease swelling and movement so your joint can heal. Wear as long as directed. Elevate  your ankle above the level of your heart as often as you can. This will help decrease swelling and pain. Prop your ankle on pillows or blankets to keep it elevated comfortably. Prevent another ankle sprain:   Let your ankle heal.  Find out how long your ligament needs to heal. Do not do any physical activity until your healthcare provider says it is okay. If you start activity too soon, you may develop a more serious injury. Warm up and stretch before you exercise or play sports. This helps your joints become strong and flexible. Use the right equipment. Always wear shoes that fit well and are made for the activity that you are doing. You may also need ankle supports, elbow and knee pads, or braces. Follow up with your doctor as directed:  Write down your questions so you remember to ask them during your visits. © Copyright [employer] 2022 Information is for End User's use only and may not be sold, redistributed or otherwise used for commercial purposes. The above information is an  only. It is not intended as medical advice for individual conditions or treatments. Talk to your doctor, nurse or pharmacist before following any medical regimen to see if it is safe and effective for you.

## 2023-08-18 NOTE — PROGRESS NOTES
Assessment/Plan:    No problem-specific Assessment & Plan notes found for this encounter. Diagnoses and all orders for this visit:    Avulsion fracture of navicular bone of right foot  -     Ambulatory Referral to [address] [place] [address]  -     Ambulatory Referral to Physical Therapy; Future    Right ankle sprain  -     Ambulatory Referral to [address] [place] [address]  -     Ambulatory Referral to Physical Therapy; Future          Reviewed foot XR. Will tx conservatively with NSAIDs, tylenol, and ice. Will also order cam boot for support with referral to Podiatry and Physical Therapy. Return to office in 4 weeks for reevaluation. Subjective:      Patient ID: Juana Villagomez is a 40 y.o. female. 47yo female presenting to clinic for evaluation of right ankle pain. Pt works for [employer] when she tripped over step and fell last Friday 8/11/2023. She notes that she inverted her ankle during fall. She also endorses hearing a crunching sound when she fell. Pain occurs daily and is aggravated with use. Pain is 9/10 but subsides to 6/10 with NSAIDs. She also puts on on foot which has helped with swelling. Ankle Injury  This is a new problem. The current episode started 1 to 4 weeks ago. The problem occurs daily. The problem has been gradually improving. Associated symptoms include numbness (plantar aspect of big toe) and weakness. The symptoms are aggravated by standing and walking. She has tried NSAIDs, ice and relaxation for the symptoms. The treatment provided mild relief. The following portions of the patient's history were reviewed and updated as appropriate: allergies, current medications, past family history, past medical history, past social history, past surgical history and problem list.    Review of Systems   Neurological: Positive for weakness and numbness (plantar aspect of big toe).          Objective:      Ht 5' (1.524 m)   Wt 95.3 kg (210 lb)   BMI 41.01 kg/m²          Physical Exam  Vitals reviewed. Constitutional:       Appearance: Normal appearance. Eyes:      Conjunctiva/sclera: Conjunctivae normal.   Musculoskeletal:      Right lower leg: Normal.      Left lower leg: Normal.      Right ankle: Swelling and ecchymosis present. Tenderness present over the lateral malleolus. Decreased range of motion. Right Achilles Tendon: Normal.      Left ankle: Normal.      Left Achilles Tendon: Normal.      Right foot: Swelling and tenderness present. Skin:     General: Skin is warm and dry. Capillary Refill: Capillary refill takes less than 2 seconds. Neurological:      Mental Status: She is alert.    Psychiatric:         Mood and Affect: Mood normal.         Behavior: Behavior normal.

## 2023-08-18 NOTE — LETTER
August 18, 2023     Patient: Vickey Brown  YOB: 1985  Date of Visit: 8/18/2023      To Whom it May Concern:    Vickey Brown is under my professional care. Renee was seen in my office on 8/18/2023. Work excuse until further notice. F/U 4 weeks. If you have any questions or concerns, please don't hesitate to call.          Sincerely,          Oni Noe MD        CC: No Recipients

## 2023-08-22 ENCOUNTER — TELEPHONE (OUTPATIENT)
Age: 38
End: 2023-08-22

## 2023-08-22 NOTE — TELEPHONE ENCOUNTER
Caller: Tammy Avery from [address] [place] [address]  Doctor: Valdemar Taylor    Reason for call: electronically faxed 8/18 OVN and work status to fax # [phone]    Call back#:

## 2023-08-31 ENCOUNTER — TELEPHONE (OUTPATIENT)
Age: 38
End: 2023-08-31

## 2023-08-31 NOTE — TELEPHONE ENCOUNTER
Spoke to patient, she notes she may have been on it more on Tuesday. I suspect her increased pain is a result of this, recommend continuing crutches, and boot when ambulating.   To call in a few days if still with increased pain

## 2023-08-31 NOTE — TELEPHONE ENCOUNTER
Caller: patient    Doctor: Taylro Frank    Reason for call: patient called in and stated they still have a lot of pain in the right foot.  They are taking tylenol and ibuprofen but it is not helping    Call back#: [phone]

## 2023-08-31 NOTE — TELEPHONE ENCOUNTER
Called and spoke w/pt and states that pain level is 8/10. Last night had 10/10 and was awakened w/burning and throbbing sensation. Pt is wearing cam boot. Pt is taking Ibuprofen 800mg every 6 hour Tylenol ES 500mg every 4 hours. Rest, elevating and icing as instructed. .  Pt is offloading w/crutches. She has apppt w/PT on 9/13/23. Has not gotten appt w/Podiatry yet. Pt states she states right side of foot bruising is lessening and more bruising noted on left side of right foot now. Has a little bit of swelling. Please advise any further recommendations re: pain. Pt will call for appt w/Podiatry.

## 2023-09-15 ENCOUNTER — APPOINTMENT (OUTPATIENT)
Dept: RADIOLOGY | Facility: MEDICAL CENTER | Age: 38
End: 2023-09-15
Payer: COMMERCIAL

## 2023-09-15 ENCOUNTER — OFFICE VISIT (OUTPATIENT)
Dept: OBGYN CLINIC | Facility: MEDICAL CENTER | Age: 38
End: 2023-09-15
Payer: OTHER MISCELLANEOUS

## 2023-09-15 VITALS — HEIGHT: 60 IN | BODY MASS INDEX: 41.23 KG/M2 | WEIGHT: 210 LBS

## 2023-09-15 DIAGNOSIS — S92.251D CLOSED AVULSION FRACTURE OF NAVICULAR BONE OF RIGHT FOOT WITH ROUTINE HEALING, SUBSEQUENT ENCOUNTER: Primary | ICD-10-CM

## 2023-09-15 DIAGNOSIS — S93.491D SPRAIN OF ANTERIOR TALOFIBULAR LIGAMENT OF RIGHT ANKLE, SUBSEQUENT ENCOUNTER: ICD-10-CM

## 2023-09-15 DIAGNOSIS — S92.251D CLOSED AVULSION FRACTURE OF NAVICULAR BONE OF RIGHT FOOT WITH ROUTINE HEALING, SUBSEQUENT ENCOUNTER: ICD-10-CM

## 2023-09-15 PROCEDURE — 73630 X-RAY EXAM OF FOOT: CPT

## 2023-09-15 PROCEDURE — 99213 OFFICE O/P EST LOW 20 MIN: CPT | Performed by: EMERGENCY MEDICINE

## 2023-09-15 NOTE — LETTER
September 15, 2023     Patient: Sivakumar Anderson  YOB: 1985  Date of Visit: 9/15/2023      To Whom it May Concern:    Sivakumar Anderson is under my professional care. Renee was seen in my office on 9/15/2023. Work excuse until further notice. Start PT. F/U 4 weeks. If you have any questions or concerns, please don't hesitate to call.          Sincerely,          Amadeo Parker MD        CC: No Recipients

## 2023-09-15 NOTE — PROGRESS NOTES
Assessment/Plan:    Diagnoses and all orders for this visit:    Closed avulsion fracture of navicular bone of right foot with routine healing, subsequent encounter  -     XR foot 3+ vw right; Future  -     Ankle Cude ankle/Ankle Brace    Sprain of anterior talofibular ligament of right ankle, subsequent encounter  -     Ankle Cude ankle/Ankle Brace    Repeat x-ray of the foot revealed stable findings with no acute or new additional findings. WBAT wean from CAM boot and crutches as tolerated, transition to lace up ankle brace which was provided today  Physical therapy needs to be scheduled by Workmen's Comp. Out of work note provided    Return in about 4 weeks (around 10/13/2023). Subjective:   Patient ID: Claude Amel is a 40 y.o. female. WC 8/11/23    Patient returns wearing CAM boot and using crutches. She notes continued pain of the dorsal proximal foot and has tried to weak from CAM.    She notes that physical therapy was scheduled on 9/13 however this was canceled as it had to be reschedule through [employer]. Initial note:  47yo female presenting to clinic for evaluation of right ankle pain. Pt works for [employer] when she tripped over step and fell last Friday 8/11/2023. She notes that she inverted her ankle during fall. She also endorses hearing a crunching sound when she fell. Pain occurs daily and is aggravated with use. Pain is 9/10 but subsides to 6/10 with NSAIDs. She also puts on on foot which has helped with swelling.     Ankle Injury  This is a new problem. The current episode started 1 to 4 weeks ago. The problem occurs daily. The problem has been gradually improving. Associated symptoms include numbness (plantar aspect of big toe) and weakness. The symptoms are aggravated by standing and walking. She has tried NSAIDs, ice and relaxation for the symptoms.  The treatment provided mild relief.          Review of Systems    The following portions of the patient's chart were reviewed and updated as appropriate:    Allergy:  No Known Allergies    Medications:    Current Outpatient Medications:   •  albuterol (2.5 mg/3 mL) 0.083 % nebulizer solution, Take 3 mL (2.5 mg total) by nebulization every 6 (six) hours as needed for wheezing or shortness of breath, Disp: 75 mL, Rfl: 3  •  albuterol (PROVENTIL HFA,VENTOLIN HFA) 90 mcg/act inhaler, Inhale 2 puffs every 4 (four) hours as needed (as needed), Disp: 18 g, Rfl: 2  •  budesonide-formoterol (SYMBICORT) 160-4.5 mcg/act inhaler, Inhale 2 puffs 2 (two) times a day Rinse mouth after use., Disp: 30.6 g, Rfl: 1  •  celecoxib (CeleBREX) 200 mg capsule, Take 1 capsule (200 mg total) by mouth 2 (two) times a day, Disp: 60 capsule, Rfl: 6  •  cyclobenzaprine (FLEXERIL) 5 mg tablet, Take 1 tablet (5 mg total) by mouth daily at bedtime, Disp: 30 tablet, Rfl: 6  •  ergocalciferol (VITAMIN D2) 50,000 units, Take 1 capsule (50,000 Units total) by mouth once a week, Disp: 12 capsule, Rfl: 1  •  fexofenadine-pseudoephedrine (Allegra-D Allergy & Congestion) 180-240 MG per 24 hr tablet, Take 1 tablet by mouth daily, Disp: 90 tablet, Rfl: 1  •  omeprazole (PriLOSEC) 40 MG capsule, Take 1 capsule (40 mg total) by mouth daily, Disp: 90 capsule, Rfl: 1    Patient Active Problem List   Diagnosis   • Asthma, mild persistent   • BPPV (benign paroxysmal positional vertigo)   • Mild single current episode of major depressive disorder (HCC)   • Seasonal allergies   • History of anxiety disorder   • Primary insomnia   • Chronic arthralgias of knees and hips   • Myalgia   • Chronic bilateral low back pain without sciatica   • Gastroesophageal reflux disease without esophagitis   • Severe obesity with body mass index (BMI) of 35.0 to 39.9 with comorbidity (HCC)   • Left breast mass   • Abnormal finding on breast imaging   • Family history of breast cancer   • Family history of ovarian cancer   • Bilateral hand pain   • Right ankle sprain   • Avulsion fracture of navicular bone of right foot       Objective:  Ht 5' (1.524 m)   Wt 95.3 kg (210 lb)   BMI 41.01 kg/m²     Right Ankle Exam     Range of Motion   Eversion: abnormal   Inversion: abnormal     Comments:  Ttp dorsal proximal foot            Physical Exam      Neurologic Exam    Procedures    I have personally reviewed pertinent films in PACS and my interpretation is Xray Right Foot: Stable fracture of the dorsal navicular bone. There are no of the fractures dislocations or concerning findings compared to previous x-ray.             Past Medical History:   Diagnosis Date   • Allergic    • Anxiety    • Asthma    • BRCA1 negative    • 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    • Depression    • GERD (gastroesophageal reflux disease)    • Obesity    • Scoliosis    • Visual impairment        Past Surgical History:   Procedure Laterality Date   • HERNIA REPAIR         Social History     Socioeconomic History   • Marital status: Single     Spouse name: Not on file   • Number of children: Not on file   • Years of education: Not on file   • Highest education level: Not on file   Occupational History   • Occupation: unemployed   Tobacco Use   • Smoking status: Former     Packs/day: 0.50     Types: Cigarettes     Quit date:      Years since quittin.7   • Smokeless tobacco: Never   Vaping Use   • Vaping Use: Never used   Substance and Sexual Activity   • Alcohol use: Yes     Comment: occasionally   • Drug use: Never   • Sexual activity: Yes     Partners: Male   Other Topics Concern   • Not on file   Social History Narrative   • Not on file     Social Determinants of Health     Financial Resource Strain: Low Risk  (2020)    Overall Financial Resource Strain (CARDIA)    • Difficulty of Paying Living Expenses: Not hard at all   Food Insecurity: No Food Insecurity (2020)    Hunger Vital Sign    • Worried About Running Out of Food in the Last Year: Never true    • Ran Out of Food in the Last Year: Never true   Transportation Needs: No Transportation Needs (11/18/2020)    PRAPARE - Transportation    • Lack of Transportation (Medical): No    • Lack of Transportation (Non-Medical): No   Physical Activity: Insufficiently Active (7/23/2020)    Exercise Vital Sign    • Days of Exercise per Week: 3 days    • Minutes of Exercise per Session: 30 min   Stress: No Stress Concern Present (7/23/2020)    [address] [facility]    • Feeling of Stress : Not at all   Social Connections:  Moderately Isolated (7/23/2020)    Social Connection and Isolation Panel [NHANES]    • Frequency of Communication with Friends and Family: More than three times a week    • Frequency of Social Gatherings with Friends and Family: More than three times a week    • Attends Nondenominational Services: 1 to 4 times per year    • Active Member of Clubs or Organizations: No    • Attends Club or Organization Meetings: Never    • Marital Status: Never    Intimate Partner Violence: Not At Risk (7/23/2020)    Humiliation, Afraid, Rape, and Kick questionnaire    • Fear of Current or Ex-Partner: No    • Emotionally Abused: No    • Physically Abused: No    • Sexually Abused: No   Housing Stability: Not on file       Family History   Problem Relation Age of Onset   • Thyroid disease Mother    • Arthritis Mother    • Diabetes Mother    • Heart attack Mother    • Depression Father    • Arthritis Father    • No Known Problems Sister    • No Known Problems Brother    • No Known Problems Son    • No Known Problems Daughter    • Coronary artery disease Maternal Grandmother    • Hyperlipidemia Maternal Grandmother    • Thyroid disease Maternal Grandmother    • Asthma Maternal Grandmother    • Arthritis Maternal Grandmother    • Vision loss Maternal Grandmother    • Breast cancer Maternal Grandmother         age unknown   • Diabetes Maternal Grandmother    • Hyperlipidemia Maternal Grandfather    • Thyroid disease Maternal Grandfather    • Diabetes Maternal Grandfather    • No Known Problems Paternal Grandmother    • No Known Problems Paternal Grandfather    • Ovarian cancer Maternal Aunt         46s   • Diabetes Maternal Aunt    • Colon cancer Maternal Aunt    • Diabetes Maternal Aunt    • Diabetes Maternal Uncle    • Diabetes Maternal Uncle    • Sickle cell anemia Paternal Aunt    • Sickle cell anemia Paternal Aunt    • No Known Problems Paternal Aunt    • No Known Problems Paternal Aunt    • No Known Problems Paternal Aunt    • No Known Problems Paternal Aunt    • No Known Problems Paternal Uncle    • No Known Problems Paternal Uncle    • Sickle cell anemia Paternal Uncle

## 2023-10-13 ENCOUNTER — OFFICE VISIT (OUTPATIENT)
Dept: OBGYN CLINIC | Facility: MEDICAL CENTER | Age: 38
End: 2023-10-13
Payer: OTHER MISCELLANEOUS

## 2023-10-13 VITALS
HEART RATE: 80 BPM | WEIGHT: 210 LBS | SYSTOLIC BLOOD PRESSURE: 146 MMHG | BODY MASS INDEX: 41.23 KG/M2 | DIASTOLIC BLOOD PRESSURE: 73 MMHG | HEIGHT: 60 IN

## 2023-10-13 DIAGNOSIS — S93.491D SPRAIN OF ANTERIOR TALOFIBULAR LIGAMENT OF RIGHT ANKLE, SUBSEQUENT ENCOUNTER: ICD-10-CM

## 2023-10-13 DIAGNOSIS — S92.251D CLOSED AVULSION FRACTURE OF NAVICULAR BONE OF RIGHT FOOT WITH ROUTINE HEALING, SUBSEQUENT ENCOUNTER: Primary | ICD-10-CM

## 2023-10-13 PROCEDURE — 99213 OFFICE O/P EST LOW 20 MIN: CPT | Performed by: EMERGENCY MEDICINE

## 2023-10-13 NOTE — LETTER
October 13, 2023     Patient: Derek Gardner  YOB: 1985  Date of Visit: 10/13/2023      To Whom it May Concern:    Derek Gardner is under my professional care. Renee was seen in my office on 10/13/2023. Work excuse until further notice. Start PT. F/U 4 weeks. If you have any questions or concerns, please don't hesitate to call.          Sincerely,          Gilberto Jack MD        CC: No Recipients

## 2023-10-13 NOTE — PROGRESS NOTES
Assessment/Plan:    Diagnoses and all orders for this visit:    Closed avulsion fracture of navicular bone of right foot with routine healing, subsequent encounter  -     Durable Medical Equipment    Sprain of anterior talofibular ligament of right ankle, subsequent encounter  -     Durable Medical Equipment    Provided airsport ankle brace in place of lace up ankle brace, which she may wean into from CAM boot. Informed Renee to wean from boot ASAP. Hope to start PT  Work excuse provided    Return in about 4 weeks (around 11/10/2023). Subjective:   Patient ID: Emily Valentin is a 40 y.o. female. WC 8/11/23    Patient returns unable to transition out of CAM boot due to anterior lateral pain, she notes the lace up ankle brace pushing on her inner ankle and causing pain. PT has not been set up yet. Previous note:  Patient returns wearing CAM boot and using crutches. She notes continued pain of the dorsal proximal foot and has tried to wean from CAM.    She notes that physical therapy was scheduled on 9/13 however this was canceled as it had to be reschedule through [employer]. Initial note:  45yo female presenting to clinic for evaluation of right ankle pain. Pt works for [employer] when she tripped over step and fell last Friday 8/11/2023. She notes that she inverted her ankle during fall. She also endorses hearing a crunching sound when she fell. Pain occurs daily and is aggravated with use. Pain is 9/10 but subsides to 6/10 with NSAIDs. She also puts on on foot which has helped with swelling. Ankle Injury  This is a new problem. The current episode started 1 to 4 weeks ago. The problem occurs daily. The problem has been gradually improving. Associated symptoms include numbness (plantar aspect of big toe) and weakness. The symptoms are aggravated by standing and walking. She has tried NSAIDs, ice and relaxation for the symptoms. The treatment provided mild relief. Review of Systems    The following portions of the patient's chart were reviewed and updated as appropriate:    Allergy:  No Known Allergies    Medications:    Current Outpatient Medications:     albuterol (2.5 mg/3 mL) 0.083 % nebulizer solution, Take 3 mL (2.5 mg total) by nebulization every 6 (six) hours as needed for wheezing or shortness of breath, Disp: 75 mL, Rfl: 3    albuterol (PROVENTIL HFA,VENTOLIN HFA) 90 mcg/act inhaler, Inhale 2 puffs every 4 (four) hours as needed (as needed), Disp: 18 g, Rfl: 2    budesonide-formoterol (SYMBICORT) 160-4.5 mcg/act inhaler, Inhale 2 puffs 2 (two) times a day Rinse mouth after use., Disp: 30.6 g, Rfl: 1    celecoxib (CeleBREX) 200 mg capsule, Take 1 capsule (200 mg total) by mouth 2 (two) times a day, Disp: 60 capsule, Rfl: 6    cyclobenzaprine (FLEXERIL) 5 mg tablet, Take 1 tablet (5 mg total) by mouth daily at bedtime, Disp: 30 tablet, Rfl: 6    ergocalciferol (VITAMIN D2) 50,000 units, Take 1 capsule (50,000 Units total) by mouth once a week, Disp: 12 capsule, Rfl: 1    fexofenadine-pseudoephedrine (Allegra-D Allergy & Congestion) 180-240 MG per 24 hr tablet, Take 1 tablet by mouth daily, Disp: 90 tablet, Rfl: 1    omeprazole (PriLOSEC) 40 MG capsule, Take 1 capsule (40 mg total) by mouth daily, Disp: 90 capsule, Rfl: 1    Patient Active Problem List   Diagnosis    Asthma, mild persistent    BPPV (benign paroxysmal positional vertigo)    Mild single current episode of major depressive disorder (HCC)    Seasonal allergies    History of anxiety disorder    Primary insomnia    Chronic arthralgias of knees and hips    Myalgia    Chronic bilateral low back pain without sciatica    Gastroesophageal reflux disease without esophagitis    Severe obesity with body mass index (BMI) of 35.0 to 39.9 with comorbidity (HCC)    Left breast mass    Abnormal finding on breast imaging    Family history of breast cancer    Family history of ovarian cancer    Bilateral hand pain Right ankle sprain    Avulsion fracture of navicular bone of right foot    Sprain of anterior talofibular ligament of right ankle, subsequent encounter    Closed avulsion fracture of navicular bone of right foot with routine healing, subsequent encounter       Objective:  /73   Pulse 80   Ht 5' (1.524 m)   Wt 95.3 kg (210 lb)   BMI 41.01 kg/m²     Right Ankle Exam     Tenderness   The patient is experiencing tenderness in the ATF and lateral malleolus. Physical Exam      Neurologic Exam    Procedures    I have personally reviewed the written report of the pertinent studies.              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     Depression     GERD (gastroesophageal reflux disease)     Obesity     Scoliosis     Visual impairment        Past Surgical History:   Procedure Laterality Date    HERNIA REPAIR         Social History     Socioeconomic History    Marital status: Single     Spouse name: Not on file    Number of children: Not on file    Years of education: Not on file    Highest education level: Not on file   Occupational History    Occupation: unemployed   Tobacco Use    Smoking status: Former     Packs/day: 0.50     Types: Cigarettes     Quit date:      Years since quittin.7    Smokeless tobacco: Never   Vaping Use    Vaping Use: Never used   Substance and Sexual Activity    Alcohol use: Yes     Comment: occasionally    Drug use: Never    Sexual activity: Yes     Partners: Male   Other Topics Concern    Not on file   Social History Narrative    Not on file     Social Determinants of Health     Financial Resource Strain: Low Risk  (2020)    Overall Financial Resource Strain (CARDIA)     Difficulty of Paying Living Expenses: Not hard at all   Food Insecurity: No Food Insecurity (2020)    Hunger Vital Sign     Worried About Running Out of Food in the Last Year: Never true     [address] in the Last Year: Never true   Transportation Needs: No Transportation Needs (11/18/2020)    PRAPARE - Transportation     Lack of Transportation (Medical): No     Lack of Transportation (Non-Medical): No   Physical Activity: Insufficiently Active (7/23/2020)    Exercise Vital Sign     Days of Exercise per Week: 3 days     Minutes of Exercise per Session: 30 min   Stress: No Stress Concern Present (7/23/2020)    [facility]     Feeling of Stress : Not at all   Social Connections:  Moderately Isolated (7/23/2020)    Social Connection and Isolation Panel [NHANES]     Frequency of Communication with Friends and Family: More than three times a week     Frequency of Social Gatherings with Friends and Family: More than three times a week     Attends Gnosticism Services: 1 to 4 times per year     Active Member of [employer] or Organizations: No     Attends Club or Organization Meetings: Never     Marital Status: Never    Intimate Partner Violence: Not At Risk (7/23/2020)    Humiliation, Afraid, Rape, and Kick questionnaire     Fear of Current or Ex-Partner: No     Emotionally Abused: No     Physically Abused: No     Sexually Abused: No   Housing Stability: Not on file       Family History   Problem Relation Age of Onset    Thyroid disease Mother     Arthritis Mother     Diabetes Mother     Heart attack Mother     Depression Father     Arthritis Father     No Known Problems Sister     No Known Problems Brother     No Known Problems Son     No Known Problems Daughter     Coronary artery disease Maternal Grandmother     Hyperlipidemia Maternal Grandmother     Thyroid disease Maternal Grandmother     Asthma Maternal Grandmother     Arthritis Maternal Grandmother     Vision loss Maternal Grandmother     Breast cancer Maternal Grandmother         age unknown    Diabetes Maternal Grandmother     Hyperlipidemia Maternal Grandfather     Thyroid disease Maternal Grandfather     Diabetes Maternal Grandfather     No Known Problems Paternal Grandmother     No Known Problems Paternal Grandfather     Ovarian cancer Maternal Aunt         46s    Diabetes Maternal Aunt     Colon cancer Maternal Aunt     Diabetes Maternal Aunt     Diabetes Maternal Uncle     Diabetes Maternal Uncle     Sickle cell anemia Paternal Aunt     Sickle cell anemia Paternal Aunt     No Known Problems Paternal Aunt     No Known Problems Paternal Aunt     No Known Problems Paternal Aunt     No Known Problems Paternal Aunt     No Known Problems Paternal Uncle     No Known Problems Paternal Uncle     Sickle cell anemia Paternal Uncle

## 2023-10-26 ENCOUNTER — TELEPHONE (OUTPATIENT)
Dept: FAMILY MEDICINE CLINIC | Facility: CLINIC | Age: 38
End: 2023-10-26

## 2023-10-26 ENCOUNTER — OFFICE VISIT (OUTPATIENT)
Dept: FAMILY MEDICINE CLINIC | Facility: CLINIC | Age: 38
End: 2023-10-26
Payer: COMMERCIAL

## 2023-10-26 VITALS
SYSTOLIC BLOOD PRESSURE: 116 MMHG | HEIGHT: 60 IN | BODY MASS INDEX: 43.19 KG/M2 | DIASTOLIC BLOOD PRESSURE: 68 MMHG | WEIGHT: 220 LBS | TEMPERATURE: 98.3 F | OXYGEN SATURATION: 97 % | HEART RATE: 84 BPM

## 2023-10-26 DIAGNOSIS — K21.9 GASTROESOPHAGEAL REFLUX DISEASE WITHOUT ESOPHAGITIS: Primary | ICD-10-CM

## 2023-10-26 DIAGNOSIS — S92.251D CLOSED AVULSION FRACTURE OF NAVICULAR BONE OF RIGHT FOOT WITH ROUTINE HEALING, SUBSEQUENT ENCOUNTER: ICD-10-CM

## 2023-10-26 DIAGNOSIS — E55.9 VITAMIN D DEFICIENCY: ICD-10-CM

## 2023-10-26 DIAGNOSIS — J45.30 MILD PERSISTENT ASTHMA, UNSPECIFIED WHETHER COMPLICATED: ICD-10-CM

## 2023-10-26 DIAGNOSIS — J30.2 SEASONAL ALLERGIES: ICD-10-CM

## 2023-10-26 DIAGNOSIS — M79.641 BILATERAL HAND PAIN: ICD-10-CM

## 2023-10-26 DIAGNOSIS — M79.642 BILATERAL HAND PAIN: ICD-10-CM

## 2023-10-26 DIAGNOSIS — E66.01 SEVERE OBESITY WITH BODY MASS INDEX (BMI) OF 35.0 TO 39.9 WITH COMORBIDITY (HCC): ICD-10-CM

## 2023-10-26 PROCEDURE — 99214 OFFICE O/P EST MOD 30 MIN: CPT | Performed by: FAMILY MEDICINE

## 2023-10-26 RX ORDER — ERGOCALCIFEROL 1.25 MG/1
50000 CAPSULE ORAL WEEKLY
Qty: 12 CAPSULE | Refills: 1 | Status: SHIPPED | OUTPATIENT
Start: 2023-10-26

## 2023-10-26 RX ORDER — CELECOXIB 200 MG/1
200 CAPSULE ORAL 2 TIMES DAILY
Qty: 60 CAPSULE | Refills: 6 | Status: SHIPPED | OUTPATIENT
Start: 2023-10-26

## 2023-10-26 RX ORDER — OMEPRAZOLE 40 MG/1
40 CAPSULE, DELAYED RELEASE ORAL DAILY
Qty: 90 CAPSULE | Refills: 1 | Status: SHIPPED | OUTPATIENT
Start: 2023-10-26

## 2023-10-26 RX ORDER — ALBUTEROL SULFATE 2.5 MG/3ML
2.5 SOLUTION RESPIRATORY (INHALATION) EVERY 6 HOURS PRN
Qty: 75 ML | Refills: 3 | Status: SHIPPED | OUTPATIENT
Start: 2023-10-26

## 2023-10-26 RX ORDER — FEXOFENADINE HCL AND PSEUDOEPHEDRINE HCI 180; 240 MG/1; MG/1
1 TABLET, EXTENDED RELEASE ORAL DAILY
Qty: 90 TABLET | Refills: 1 | Status: SHIPPED | OUTPATIENT
Start: 2023-10-26

## 2023-10-26 NOTE — PROGRESS NOTES
Assessment/Plan: Guidance given regarding obesity. Patient will be referred to bariatric team.  Patient will continue with other meds for chronic conditions. Patient will follow with orthopedics appropriately. Refills given at this time. Follow-up in 6 months. Diagnoses and all orders for this visit:    Gastroesophageal reflux disease without esophagitis  -     omeprazole (PriLOSEC) 40 MG capsule; Take 1 capsule (40 mg total) by mouth daily    Mild persistent asthma, unspecified whether complicated  -     albuterol (2.5 mg/3 mL) 0.083 % nebulizer solution; Take 3 mL (2.5 mg total) by nebulization every 6 (six) hours as needed for wheezing or shortness of breath    Closed avulsion fracture of navicular bone of right foot with routine healing, subsequent encounter    Bilateral hand pain  -     celecoxib (CeleBREX) 200 mg capsule; Take 1 capsule (200 mg total) by mouth 2 (two) times a day    Vitamin D deficiency  -     ergocalciferol (VITAMIN D2) 50,000 units; Take 1 capsule (50,000 Units total) by mouth once a week    Seasonal allergies  -     fexofenadine-pseudoephedrine (Allegra-D Allergy & Congestion) 180-240 MG per 24 hr tablet; Take 1 tablet by mouth daily    Severe obesity with body mass index (BMI) of 35.0 to 39.9 with comorbidity ([address] )  -     Ambulatory Referral to Weight Management; Future            Subjective:        Patient ID: Mina Bass is a 40 y.o. female. Patient is here to follow-up on asthma GERD vitamin D deficiency with history navicular bone fracture right foot. Patient seeing orthopedics. Patient now in splint. Patient was in cam walker. Asthma has been stable. GERD is stable.   Mood is stable at this time          The following portions of the patient's history were reviewed and updated as appropriate: allergies, current medications, past family history, past medical history, past social history, past surgical history and problem list.      Review of Systems Constitutional: Negative. HENT: Negative. Eyes: Negative. Respiratory: Negative. Cardiovascular: Negative. Gastrointestinal: Negative. Endocrine: Negative. Genitourinary: Negative. Musculoskeletal:  Positive for arthralgias and gait problem. Skin: Negative. Allergic/Immunologic: Negative. Hematological: Negative. Psychiatric/Behavioral: Negative. Objective:               /68 (BP Location: Right arm, Patient Position: Sitting, Cuff Size: Large)   Pulse 84   Temp 98.3 °F (36.8 °C) (Temporal)   Ht 5' (1.524 m)   Wt 99.8 kg (220 lb)   LMP 10/23/2023 (Exact Date)   SpO2 97%   BMI 42.97 kg/m²          Physical Exam  Vitals and nursing note reviewed. Constitutional:       General: She is not in acute distress. Appearance: Normal appearance. She is not ill-appearing, toxic-appearing or diaphoretic. HENT:      Head: Normocephalic and atraumatic. Right Ear: Tympanic membrane, ear canal and external ear normal. There is no impacted cerumen. Left Ear: Tympanic membrane, ear canal and external ear normal. There is no impacted cerumen. Nose: Nose normal. No congestion or rhinorrhea. Mouth/Throat:      Mouth: Mucous membranes are moist.      Pharynx: No oropharyngeal exudate or posterior oropharyngeal erythema. Eyes:      General: No scleral icterus. Right eye: No discharge. Left eye: No discharge. Neck:      Vascular: No carotid bruit. Cardiovascular:      Rate and Rhythm: Normal rate and regular rhythm. Pulses: Normal pulses. Heart sounds: Normal heart sounds. No murmur heard. No friction rub. No gallop. Pulmonary:      Effort: Pulmonary effort is normal. No respiratory distress. Breath sounds: Normal breath sounds. No stridor. No wheezing, rhonchi or rales. Chest:      Chest wall: No tenderness. Musculoskeletal:         General: Tenderness present. No swelling, deformity or signs of injury. Cervical back: Normal range of motion and neck supple. No rigidity. No muscular tenderness. Right lower leg: No edema. Left lower leg: No edema. Comments: Right foot and ankle with splint   Lymphadenopathy:      Cervical: No cervical adenopathy. Skin:     General: Skin is warm and dry. Capillary Refill: Capillary refill takes less than 2 seconds. Coloration: Skin is not jaundiced. Findings: No bruising, erythema, lesion or rash. Neurological:      Mental Status: She is alert and oriented to person, place, and time. Mental status is at baseline. Cranial Nerves: No cranial nerve deficit. Sensory: No sensory deficit. Motor: No weakness. Coordination: Coordination normal.      Gait: Gait normal.   Psychiatric:         Mood and Affect: Mood normal.         Behavior: Behavior normal.         Thought Content:  Thought content normal.         Judgment: Judgment normal.

## 2023-10-26 NOTE — TELEPHONE ENCOUNTER
Patient called, stated she called insurance and that [address] [place] [address] may cover with a prior auth.  Please advise

## 2023-10-27 NOTE — TELEPHONE ENCOUNTER
Spoke with patient and advised of medication being on backorder through next year. I also advised patient to follow up bariatric as discussed previously. Patient verbalized understanding and agrees.

## 2023-11-01 DIAGNOSIS — E66.01 SEVERE OBESITY WITH BODY MASS INDEX (BMI) OF 35.0 TO 39.9 WITH COMORBIDITY (HCC): Primary | ICD-10-CM

## 2023-11-01 NOTE — TELEPHONE ENCOUNTER
Patient cannot get into weight management until Feb 2024. Patient called [facility] [address], they have Ozempic starting dose of 0.25mg. Please advise on calling this in for patient.     [address]

## 2023-11-10 ENCOUNTER — OFFICE VISIT (OUTPATIENT)
Dept: OBGYN CLINIC | Facility: MEDICAL CENTER | Age: 38
End: 2023-11-10
Payer: OTHER MISCELLANEOUS

## 2023-11-10 VITALS
WEIGHT: 220 LBS | BODY MASS INDEX: 43.19 KG/M2 | HEIGHT: 60 IN | HEART RATE: 84 BPM | SYSTOLIC BLOOD PRESSURE: 116 MMHG | DIASTOLIC BLOOD PRESSURE: 68 MMHG

## 2023-11-10 DIAGNOSIS — S93.491D SPRAIN OF ANTERIOR TALOFIBULAR LIGAMENT OF RIGHT ANKLE, SUBSEQUENT ENCOUNTER: ICD-10-CM

## 2023-11-10 DIAGNOSIS — G89.29 CHRONIC PAIN OF RIGHT ANKLE: ICD-10-CM

## 2023-11-10 DIAGNOSIS — S92.251D CLOSED AVULSION FRACTURE OF NAVICULAR BONE OF RIGHT FOOT WITH ROUTINE HEALING, SUBSEQUENT ENCOUNTER: Primary | ICD-10-CM

## 2023-11-10 DIAGNOSIS — M25.571 CHRONIC PAIN OF RIGHT ANKLE: ICD-10-CM

## 2023-11-10 PROCEDURE — 99213 OFFICE O/P EST LOW 20 MIN: CPT | Performed by: EMERGENCY MEDICINE

## 2023-11-10 NOTE — PROGRESS NOTES
Assessment/Plan:    Diagnoses and all orders for this visit:    Closed avulsion fracture of navicular bone of right foot with routine healing, subsequent encounter  -     MRI ankle/heel right  wo contrast; Future    Sprain of anterior talofibular ligament of right ankle, subsequent encounter  -     MRI ankle/heel right  wo contrast; Future    Chronic pain of right ankle  -     MRI ankle/heel right  wo contrast; Future    MRI Right Ankle for continued pain after injury 2 months out. Continue PT  Work excuse note provided  PT notes not available    Return for Follow Up After Imaging Study. Subjective:   Patient ID: Domenica Banks is a 40 y.o. female.  8/11/23    Patient returns with continued pain, she started PT and had 3 total visits. She notes medial, anterior and lateral pain. Weaned from boot into ankle brace. Pain with WB, found she can only drive for 10 min due to increased pain. PT at [facility]     Previous note:  Patient returns unable to transition out of CAM boot due to anterior lateral pain, she notes the lace up ankle brace pushing on her inner ankle and causing pain. PT has not been set up yet. Previous note:  Patient returns wearing CAM boot and using crutches. She notes continued pain of the dorsal proximal foot and has tried to wean from CAM.    She notes that physical therapy was scheduled on 9/13 however this was canceled as it had to be reschedule through [email or web address]. Initial note:  47yo female presenting to clinic for evaluation of right ankle pain. Pt works for [employer] when she tripped over step and fell last Friday 8/11/2023. She notes that she inverted her ankle during fall. She also endorses hearing a crunching sound when she fell. Pain occurs daily and is aggravated with use. Pain is 9/10 but subsides to 6/10 with NSAIDs. She also puts on on foot which has helped with swelling. Ankle Injury  This is a new problem.  The current episode started 1 to 4 weeks ago. The problem occurs daily. The problem has been gradually improving. Associated symptoms include numbness (plantar aspect of big toe) and weakness. The symptoms are aggravated by standing and walking. She has tried NSAIDs, ice and relaxation for the symptoms. The treatment provided mild relief. Review of Systems    The following portions of the patient's chart were reviewed and updated as appropriate:    Allergy:  No Known Allergies    Medications:    Current Outpatient Medications:     albuterol (2.5 mg/3 mL) 0.083 % nebulizer solution, Take 3 mL (2.5 mg total) by nebulization every 6 (six) hours as needed for wheezing or shortness of breath, Disp: 75 mL, Rfl: 3    albuterol (PROVENTIL HFA,VENTOLIN HFA) 90 mcg/act inhaler, Inhale 2 puffs every 4 (four) hours as needed (as needed), Disp: 18 g, Rfl: 2    budesonide-formoterol (SYMBICORT) 160-4.5 mcg/act inhaler, Inhale 2 puffs 2 (two) times a day Rinse mouth after use., Disp: 30.6 g, Rfl: 1    celecoxib (CeleBREX) 200 mg capsule, Take 1 capsule (200 mg total) by mouth 2 (two) times a day, Disp: 60 capsule, Rfl: 6    ergocalciferol (VITAMIN D2) 50,000 units, Take 1 capsule (50,000 Units total) by mouth once a week, Disp: 12 capsule, Rfl: 1    fexofenadine-pseudoephedrine (Allegra-D Allergy & Congestion) 180-240 MG per 24 hr tablet, Take 1 tablet by mouth daily, Disp: 90 tablet, Rfl: 1    omeprazole (PriLOSEC) 40 MG capsule, Take 1 capsule (40 mg total) by mouth daily, Disp: 90 capsule, Rfl: 1    semaglutide, 0.25 or 0.5 mg/dose, (Ozempic, 0.25 or 0.5 MG/DOSE,) 2 mg/3 mL injection pen, 0.25 mg under the skin every 7 days for 4 doses (28 days), THEN 0.5 mg under the skin every 7 days, Disp: 9 mL, Rfl: 0    Patient Active Problem List   Diagnosis    Asthma, mild persistent    BPPV (benign paroxysmal positional vertigo)    Mild single current episode of major depressive disorder (HCC)    Seasonal allergies    History of anxiety disorder    Primary insomnia    Chronic arthralgias of knees and hips    Myalgia    Chronic bilateral low back pain without sciatica    Gastroesophageal reflux disease without esophagitis    Severe obesity with body mass index (BMI) of 35.0 to 39.9 with comorbidity (HCC)    Left breast mass    Abnormal finding on breast imaging    Family history of breast cancer    Family history of ovarian cancer    Bilateral hand pain    Right ankle sprain    Avulsion fracture of navicular bone of right foot    Sprain of anterior talofibular ligament of right ankle, subsequent encounter    Closed avulsion fracture of navicular bone of right foot with routine healing, subsequent encounter       Objective:  /68   Pulse 84   Ht 5' (1.524 m)   Wt 99.8 kg (220 lb)   LMP 10/23/2023 (Exact Date)   BMI 42.97 kg/m²     Ortho Exam    Physical Exam  Vitals reviewed. Constitutional:       Appearance: She is well-developed. HENT:      Head: Normocephalic and atraumatic. Eyes:      Conjunctiva/sclera: Conjunctivae normal.   Cardiovascular:      Rate and Rhythm: Normal rate. Pulmonary:      Effort: Pulmonary effort is normal. No respiratory distress. Musculoskeletal:      Cervical back: Normal range of motion and neck supple. Skin:     General: Skin is warm and dry. Neurological:      Mental Status: She is alert and oriented to person, place, and time. Psychiatric:         Behavior: Behavior normal.           Neurologic Exam     Mental Status   Oriented to person, place, and time. Procedures    I have personally reviewed the written report of the pertinent studies.              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     Depression     GERD (gastroesophageal reflux disease)     Obesity     Scoliosis     Visual impairment        Past Surgical History:   Procedure Laterality Date    HERNIA REPAIR         Social History     Socioeconomic History    Marital status: Single     Spouse name: Not on file    Number of children: Not on file    Years of education: Not on file    Highest education level: Not on file   Occupational History    Occupation: unemployed   Tobacco Use    Smoking status: Former     Packs/day: 0.50     Types: Cigarettes     Quit date:      Years since quittin.8    Smokeless tobacco: Never   Vaping Use    Vaping Use: Never used   Substance and Sexual Activity    Alcohol use: Yes     Comment: occasionally    Drug use: Never    Sexual activity: Yes     Partners: Male   Other Topics Concern    Not on file   Social History Narrative    Not on file     Social Determinants of Health     Financial Resource Strain: Low Risk  (2020)    Overall Financial Resource Strain (CARDIA)     Difficulty of Paying Living Expenses: Not hard at all   Food Insecurity: No Food Insecurity (2020)    Hunger Vital Sign     Worried About Running Out of Food in the Last Year: Never true     [address] in the Last Year: Never true   Transportation Needs: No Transportation Needs (2020)    PRAPARE - Transportation     Lack of Transportation (Medical): No     Lack of Transportation (Non-Medical): No   Physical Activity: Insufficiently Active (2020)    Exercise Vital Sign     Days of Exercise per Week: 3 days     Minutes of Exercise per Session: 30 min   Stress: No Stress Concern Present (2020)    [facility]     Feeling of Stress : Not at all   Social Connections:  Moderately Isolated (2020)    Social Connection and Isolation Panel [NHANES]     Frequency of Communication with Friends and Family: More than three times a week     Frequency of Social Gatherings with Friends and Family: More than three times a week     Attends Mosque Services: 1 to 4 times per year     Active Member of [employer] or Organizations: No     Attends Club or Organization Meetings: Never     Marital Status: Never    Intimate Partner Violence: Not At Risk (7/23/2020)    Humiliation, Afraid, Rape, and Kick questionnaire     Fear of Current or Ex-Partner: No     Emotionally Abused: No     Physically Abused: No     Sexually Abused: No   Housing Stability: Not on file       Family History   Problem Relation Age of Onset    Thyroid disease Mother     Arthritis Mother     Diabetes Mother     Heart attack Mother     Depression Father     Arthritis Father     No Known Problems Sister     No Known Problems Brother     No Known Problems Son     No Known Problems Daughter     Coronary artery disease Maternal Grandmother     Hyperlipidemia Maternal Grandmother     Thyroid disease Maternal Grandmother     Asthma Maternal Grandmother     Arthritis Maternal Grandmother     Vision loss Maternal Grandmother     Breast cancer Maternal Grandmother         age unknown    Diabetes Maternal Grandmother     Hyperlipidemia Maternal Grandfather     Thyroid disease Maternal Grandfather     Diabetes Maternal Grandfather     No Known Problems Paternal Grandmother     No Known Problems Paternal Grandfather     Ovarian cancer Maternal Aunt         46s    Diabetes Maternal Aunt     Colon cancer Maternal Aunt     Diabetes Maternal Aunt     Diabetes Maternal Uncle     Diabetes Maternal Uncle     Sickle cell anemia Paternal Aunt     Sickle cell anemia Paternal Aunt     No Known Problems Paternal Aunt     No Known Problems Paternal Aunt     No Known Problems Paternal Aunt     No Known Problems Paternal Aunt     No Known Problems Paternal Uncle     No Known Problems Paternal Uncle     Sickle cell anemia Paternal Uncle

## 2023-11-10 NOTE — LETTER
November 10, 2023     Patient: Jody Paz  YOB: 1985  Date of Visit: 11/10/2023      To Whom it May Concern:    Jody Paz is under my professional care. Renee was seen in my office on 11/10/2023. Work excuse until further notice. Continue PT.  F/U after MRI. If you have any questions or concerns, please don't hesitate to call.          Sincerely,          Liyah Quintero MD        CC: No Recipients

## 2023-12-15 ENCOUNTER — OFFICE VISIT (OUTPATIENT)
Dept: OBGYN CLINIC | Facility: MEDICAL CENTER | Age: 38
End: 2023-12-15
Payer: OTHER MISCELLANEOUS

## 2023-12-15 ENCOUNTER — TELEPHONE (OUTPATIENT)
Age: 38
End: 2023-12-15

## 2023-12-15 VITALS
SYSTOLIC BLOOD PRESSURE: 118 MMHG | BODY MASS INDEX: 43.19 KG/M2 | HEART RATE: 81 BPM | WEIGHT: 220 LBS | DIASTOLIC BLOOD PRESSURE: 68 MMHG | HEIGHT: 60 IN

## 2023-12-15 DIAGNOSIS — M25.571 CHRONIC PAIN OF RIGHT ANKLE: ICD-10-CM

## 2023-12-15 DIAGNOSIS — G89.29 CHRONIC PAIN OF RIGHT ANKLE: ICD-10-CM

## 2023-12-15 DIAGNOSIS — S92.251D CLOSED AVULSION FRACTURE OF NAVICULAR BONE OF RIGHT FOOT WITH ROUTINE HEALING, SUBSEQUENT ENCOUNTER: Primary | ICD-10-CM

## 2023-12-15 DIAGNOSIS — S93.491D SPRAIN OF ANTERIOR TALOFIBULAR LIGAMENT OF RIGHT ANKLE, SUBSEQUENT ENCOUNTER: ICD-10-CM

## 2023-12-15 PROCEDURE — 99213 OFFICE O/P EST LOW 20 MIN: CPT | Performed by: EMERGENCY MEDICINE

## 2023-12-15 NOTE — LETTER
December 15, 2023     Patient: Laura Louis  YOB: 1985  Date of Visit: 12/15/2023      To Whom it May Concern:    Laura Louis is under my professional care. Renee was seen in my office on 12/15/2023. Work excuse until further notice. Referred to Orthopedic Foot and Ankle specialist.      If you have any questions or concerns, please don't hesitate to call.          Sincerely,          Shady Osei MD        CC: No Recipients

## 2023-12-15 NOTE — PROGRESS NOTES
Assessment/Plan:    Diagnoses and all orders for this visit:    Closed avulsion fracture of navicular bone of right foot with routine healing, subsequent encounter  -     Ambulatory Referral to Orthopedic Surgery; Future    Sprain of anterior talofibular ligament of right ankle, subsequent encounter  -     Ambulatory Referral to Orthopedic Surgery; Future    Chronic pain of right ankle  -     Ambulatory Referral to Orthopedic Surgery; Future    Reviewed outside MRI Ankle (see below)  Referred to Foot and Ankle specialist given continued chronic pain despite time, bracing and PT. Work excuse note provided. Return if symptoms worsen or fail to improve. Subjective:   Patient ID: Tatyana Santana is a 41 y.o. female. WC 8/11/23    Patient returns to review MRI results from [facility]. She still has pain medial and anterior ankle. She's utilizing ankle brace. Previous note:  Patient returns with continued pain, she started PT and had 3 total visits. She notes medial, anterior and lateral pain. Weaned from boot into ankle brace. Pain with WB, found she can only drive for 10 min due to increased pain. PT at [facility]     Previous note:  Patient returns unable to transition out of CAM boot due to anterior lateral pain, she notes the lace up ankle brace pushing on her inner ankle and causing pain. PT has not been set up yet. Previous note:  Patient returns wearing CAM boot and using crutches. She notes continued pain of the dorsal proximal foot and has tried to wean from CAM.    She notes that physical therapy was scheduled on 9/13 however this was canceled as it had to be reschedule through [email or web address]. Initial note:  47yo female presenting to clinic for evaluation of right ankle pain. Pt works for [employer] when she tripped over step and fell last Friday 8/11/2023. She notes that she inverted her ankle during fall.  She also endorses hearing a crunching sound when she fell. Pain occurs daily and is aggravated with use. Pain is 9/10 but subsides to 6/10 with NSAIDs. She also puts on on foot which has helped with swelling. Ankle Injury  This is a new problem. The current episode started 1 to 4 weeks ago. The problem occurs daily. The problem has been gradually improving. Associated symptoms include numbness (plantar aspect of big toe) and weakness. The symptoms are aggravated by standing and walking. She has tried NSAIDs, ice and relaxation for the symptoms. The treatment provided mild relief. Review of Systems    The following portions of the patient's chart were reviewed and updated as appropriate:    Allergy:  No Known Allergies    Medications:    Current Outpatient Medications:     albuterol (2.5 mg/3 mL) 0.083 % nebulizer solution, Take 3 mL (2.5 mg total) by nebulization every 6 (six) hours as needed for wheezing or shortness of breath, Disp: 75 mL, Rfl: 3    albuterol (PROVENTIL HFA,VENTOLIN HFA) 90 mcg/act inhaler, Inhale 2 puffs every 4 (four) hours as needed (as needed), Disp: 18 g, Rfl: 2    budesonide-formoterol (SYMBICORT) 160-4.5 mcg/act inhaler, Inhale 2 puffs 2 (two) times a day Rinse mouth after use., Disp: 30.6 g, Rfl: 1    celecoxib (CeleBREX) 200 mg capsule, Take 1 capsule (200 mg total) by mouth 2 (two) times a day, Disp: 60 capsule, Rfl: 6    ergocalciferol (VITAMIN D2) 50,000 units, Take 1 capsule (50,000 Units total) by mouth once a week, Disp: 12 capsule, Rfl: 1    fexofenadine-pseudoephedrine (Allegra-D Allergy & Congestion) 180-240 MG per 24 hr tablet, Take 1 tablet by mouth daily, Disp: 90 tablet, Rfl: 1    omeprazole (PriLOSEC) 40 MG capsule, Take 1 capsule (40 mg total) by mouth daily, Disp: 90 capsule, Rfl: 1    semaglutide, 0.25 or 0.5 mg/dose, (Ozempic, 0.25 or 0.5 MG/DOSE,) 2 mg/3 mL injection pen, 0.25 mg under the skin every 7 days for 4 doses (28 days), THEN 0.5 mg under the skin every 7 days, Disp: 9 mL, Rfl: 0    Patient Active Problem List   Diagnosis    Asthma, mild persistent    BPPV (benign paroxysmal positional vertigo)    Mild single current episode of major depressive disorder (HCC)    Seasonal allergies    History of anxiety disorder    Primary insomnia    Chronic arthralgias of knees and hips    Myalgia    Chronic bilateral low back pain without sciatica    Gastroesophageal reflux disease without esophagitis    Severe obesity with body mass index (BMI) of 35.0 to 39.9 with comorbidity (HCC)    Left breast mass    Abnormal finding on breast imaging    Family history of breast cancer    Family history of ovarian cancer    Bilateral hand pain    Right ankle sprain    Avulsion fracture of navicular bone of right foot    Sprain of anterior talofibular ligament of right ankle, subsequent encounter    Closed avulsion fracture of navicular bone of right foot with routine healing, subsequent encounter       Objective:  /68   Pulse 81   Ht 5' (1.524 m)   Wt 99.8 kg (220 lb)   BMI 42.97 kg/m²     Ortho Exam    Physical Exam      Neurologic Exam    Procedures    I have personally reviewed the written report of the pertinent studies. Outside MRI Right Ankle from 12/8/23  Impression:    Age-indeterminate dorsal navicular avulsion fracture. This is not consistent with the given age of the patient's injury. There is mild Achilles tendinitis as well.               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     Depression     GERD (gastroesophageal reflux disease)     Obesity     Scoliosis     Visual impairment        Past Surgical History:   Procedure Laterality Date    HERNIA REPAIR         Social History     Socioeconomic History    Marital status: Single     Spouse name: Not on file    Number of children: Not on file    Years of education: Not on file    Highest education level: Not on file   Occupational History    Occupation: unemployed   Tobacco Use Smoking status: Former     Current packs/day: 0.00     Types: Cigarettes     Quit date:      Years since quittin.9    Smokeless tobacco: Never   Vaping Use    Vaping status: Never Used   Substance and Sexual Activity    Alcohol use: Yes     Comment: occasionally    Drug use: Never    Sexual activity: Yes     Partners: Male   Other Topics Concern    Not on file   Social History Narrative    Not on file     Social Determinants of Health     Financial Resource Strain: Low Risk  (2020)    Overall Financial Resource Strain (CARDIA)     Difficulty of Paying Living Expenses: Not hard at all   Food Insecurity: No Food Insecurity (2020)    Hunger Vital Sign     Worried About Running Out of Food in the Last Year: Never true     Ran Out of Food in the Last Year: Never true   Transportation Needs: No Transportation Needs (2020)    PRAPARE - Transportation     Lack of Transportation (Medical): No     Lack of Transportation (Non-Medical): No   Physical Activity: Insufficiently Active (2020)    Exercise Vital Sign     Days of Exercise per Week: 3 days     Minutes of Exercise per Session: 30 min   Stress: No Stress Concern Present (2020)    [address] [place] [address]     Feeling of Stress : Not at all   Social Connections:  Moderately Isolated (2020)    Social Connection and Isolation Panel [NHANES]     Frequency of Communication with Friends and Family: More than three times a week     Frequency of Social Gatherings with Friends and Family: More than three times a week     Attends Congregational Services: 1 to 4 times per year     Active Member of [employer] or Organizations: No     Attends Club or Organization Meetings: Never     Marital Status: Never    Intimate Partner Violence: Not At Risk (2020)    Humiliation, Afraid, Rape, and Kick questionnaire     Fear of Current or Ex-Partner: No     Emotionally Abused: No     Physically Abused: No     Sexually Abused: No   Housing Stability: Not on file       Family History   Problem Relation Age of Onset    Thyroid disease Mother     Arthritis Mother     Diabetes Mother     Heart attack Mother     Depression Father     Arthritis Father     No Known Problems Sister     No Known Problems Brother     No Known Problems Son     No Known Problems Daughter     Coronary artery disease Maternal Grandmother     Hyperlipidemia Maternal Grandmother     Thyroid disease Maternal Grandmother     Asthma Maternal Grandmother     Arthritis Maternal Grandmother     Vision loss Maternal Grandmother     Breast cancer Maternal Grandmother         age unknown    Diabetes Maternal Grandmother     Hyperlipidemia Maternal Grandfather     Thyroid disease Maternal Grandfather     Diabetes Maternal Grandfather     No Known Problems Paternal Grandmother     No Known Problems Paternal Grandfather     Ovarian cancer Maternal Aunt         46s    Diabetes Maternal Aunt     Colon cancer Maternal Aunt     Diabetes Maternal Aunt     Diabetes Maternal Uncle     Diabetes Maternal Uncle     Sickle cell anemia Paternal Aunt     Sickle cell anemia Paternal Aunt     No Known Problems Paternal Aunt     No Known Problems Paternal Aunt     No Known Problems Paternal Aunt     No Known Problems Paternal Aunt     No Known Problems Paternal Uncle     No Known Problems Paternal Uncle     Sickle cell anemia Paternal Uncle

## 2023-12-15 NOTE — TELEPHONE ENCOUNTER
Caller: Radha Del Cid      Doctor/Office: Esteban    CB#: n/a      What needs to be faxed: Office notes    ATTN to: Radha Del Cid    Fax#: [phone]      Documents were successfully e-faxed

## 2023-12-19 ENCOUNTER — TELEPHONE (OUTPATIENT)
Age: 38
End: 2023-12-19

## 2023-12-19 NOTE — TELEPHONE ENCOUNTER
Spoke to pt on phone and relayed info from the report. Pt states that she hasn't had any prior foot injuries. I advised her to discuss it with Dr. Lachman who may be able to provide some better insight on her injury and how to deal with her work comp case moving forward. She is scheduled for an appt. tomorrow.

## 2023-12-19 NOTE — TELEPHONE ENCOUNTER
Caller: Patient    Doctor: Shreya    Reason for call: Patient stated when she was in office 12/15 Dr Cash stated they were waiting for the MRI report from Yvonne Chua. When the report was received the office would contact the patient to discuss the results. Please contact patient to advise    Call back#: [phone]

## 2023-12-20 ENCOUNTER — OFFICE VISIT (OUTPATIENT)
Dept: OBGYN CLINIC | Facility: CLINIC | Age: 38
End: 2023-12-20
Payer: OTHER MISCELLANEOUS

## 2023-12-20 ENCOUNTER — TELEPHONE (OUTPATIENT)
Age: 38
End: 2023-12-20

## 2023-12-20 ENCOUNTER — TELEPHONE (OUTPATIENT)
Dept: OBGYN CLINIC | Facility: HOSPITAL | Age: 38
End: 2023-12-20

## 2023-12-20 VITALS — HEIGHT: 60 IN | BODY MASS INDEX: 43.19 KG/M2 | WEIGHT: 220 LBS

## 2023-12-20 DIAGNOSIS — S92.251D CLOSED AVULSION FRACTURE OF NAVICULAR BONE OF RIGHT FOOT WITH ROUTINE HEALING, SUBSEQUENT ENCOUNTER: ICD-10-CM

## 2023-12-20 DIAGNOSIS — G89.29 CHRONIC PAIN OF RIGHT ANKLE: ICD-10-CM

## 2023-12-20 DIAGNOSIS — M25.571 CHRONIC PAIN OF RIGHT ANKLE: ICD-10-CM

## 2023-12-20 DIAGNOSIS — S93.491D SPRAIN OF ANTERIOR TALOFIBULAR LIGAMENT OF RIGHT ANKLE, SUBSEQUENT ENCOUNTER: ICD-10-CM

## 2023-12-20 PROCEDURE — 99213 OFFICE O/P EST LOW 20 MIN: CPT | Performed by: ORTHOPAEDIC SURGERY

## 2023-12-20 NOTE — TELEPHONE ENCOUNTER
Caller: Zoe    Doctor/Office: Lachman/Burkey CB#: [phone]      What needs to be faxed: OV 12/20/23 & 12/15/23    ATTN to: Divina    Fax#: [phone]      Documents were successfully e-faxed

## 2023-12-20 NOTE — PROGRESS NOTES
James R Lachman, M.D.  Attending, Orthopaedic Surgery  Foot and Ankle  [facility]        ORTHOPAEDIC FOOT AND ANKLE CLINIC VISIT     Assessment:     Encounter Diagnoses   Name Primary?    Closed avulsion fracture of navicular bone of right foot with routine healing, subsequent encounter     Sprain of anterior talofibular ligament of right ankle, subsequent encounter     Chronic pain of right ankle               Plan:   The patient verbalized understanding of exam findings and treatment plan. We engaged in the shared decision-making process and treatment options were discussed at length with the patient. Surgical and conservative management discussed today along with risks and benefits.  Patient has a right dorsal navicular avulsion fracture with associated ATFL sprain sustained in August 2023   This is a workers comp case. She was nonweightbearing for 4 weeks, in a cam boot for 8 weeks and did not begin PT for 2 months after her injury. These delays have led to a prolonged recovery.  Her MRI does not show any surgical issue.  The avulsion fracture of her navicular was noted on Xray and also seen on MRI.  Her right ankle is significantly weak in all planes compared to the contralateral ankle  Continue PT/HEP as directed   WBAT in supportive sneaker   Compression sock for swelling control   RTC PRN. May return to Dr. Cash for scheduled f/u visit.      History of Present Illness:   Chief Complaint:   Chief Complaint   Patient presents with    Right Foot - Pain     Renee Wall is a 38 y.o. female who is being seen for right foot pain. Patient states she tripped over a step on 8/11/23, inverting her ankle and has had continued pain. This is being evaluated as a workers comp pain. She was seen by Dr. Cash who has placed her in a cam boot/ankle brace. She complains of continued pain.  Pain is localized at dorsal aspect of  navicular with minimal radiating and described as sharp and  severe. Patient denies numbness, tingling or radicular pain.  Denies history of neuropathy.  Patient does not  smoke, does not have diabetes and does not take blood thinners.  Patient denies family history of anesthesia complications and has not had any complications with anesthesia.     Pain/symptom timing:  Worse during the day when active  Pain/symptom context:  Worse with activites and work  Pain/symptom modifying factors:  Rest makes better, activities make worse  Pain/symptom associated signs/symptoms: none    Prior treatment   NSAIDsYes    Injections No   Bracing/Orthotics Yes   Physical Therapy Yes     Orthopedic Surgical History:   See below    Past Medical, Surgical and Social History:  Past Medical History:  has a past medical history of Allergic, Anxiety, Asthma, BRCA1 negative, 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, Depression, GERD (gastroesophageal reflux disease), Obesity, Scoliosis, and Visual impairment.  Problem List: does not have any pertinent problems on file.  Past Surgical History:  has a past surgical history that includes Hernia repair.  Family History: family history includes Arthritis in her father, maternal grandmother, and mother; Asthma in her maternal grandmother; Breast cancer in her maternal grandmother; Colon cancer in her maternal aunt; Coronary artery disease in her maternal grandmother; Depression in her father; Diabetes in her maternal aunt, maternal aunt, maternal grandfather, maternal grandmother, maternal uncle, maternal uncle, and mother; Heart attack in her mother; Hyperlipidemia in her maternal grandfather and maternal grandmother; No Known Problems in her brother, daughter, paternal aunt, paternal aunt, paternal aunt, paternal aunt, paternal grandfather, paternal grandmother, paternal uncle, paternal uncle, sister, and son; Ovarian cancer in her maternal aunt; Sickle cell anemia in her paternal aunt, paternal aunt, and paternal uncle; Thyroid disease in her maternal grandfather, maternal  grandmother, and mother; Vision loss in her maternal grandmother.  Social History:  reports that she quit smoking about 20 years ago. Her smoking use included cigarettes. She has never used smokeless tobacco. She reports current alcohol use. She reports that she does not use drugs.  Current Medications: has a current medication list which includes the following prescription(s): albuterol, albuterol, budesonide-formoterol, celecoxib, ergocalciferol, fexofenadine-pseudoephedrine, omeprazole, and semaglutide (0.25 or 0.5 mg/dose).  Allergies: has No Known Allergies.     Review of Systems:  General- denies fever/chills  HEENT- denies hearing loss or sore throat  Eyes- denies eye pain or visual disturbances, denies red eyes  Respiratory- denies cough or SOB  Cardio- denies chest pain or palpitations  GI- denies abdominal pain  Endocrine- denies urinary frequency  Urinary- denies pain with urination  Musculoskeletal- Negative except noted above  Skin- denies rashes or wounds  Neurological- denies dizziness or headache  Psychiatric- denies anxiety or difficulty concentrating    Physical Exam:   Ht 5' (1.524 m)   Wt 99.8 kg (220 lb)   BMI 42.97 kg/m²   General/Constitutional: No apparent distress: well-nourished and well developed.  Eyes: normal ocular motion  Cardio: RRR, Normal S1S2, No m/r/g  Lymphatic: No appreciable lymphadenopathy  Respiratory: Non-labored breathing, CTA b/l no w/c/r  Vascular: No edema, swelling or tenderness, except as noted in detailed exam.  Integumentary: No impressive skin lesions present, except as noted in detailed exam.  Neuro: No ataxia or tremors noted  Psych: Normal mood and affect, oriented to person, place and time. Appropriate affect.  Musculoskeletal: Normal, except as noted in detailed exam and in HPI.    Examination    Right    Gait Normal and Antalgic   Musculoskeletal Tender to palpation at lateral and medial ankle    Skin Normal.      Nails Normal    Range of Motion  15 degrees  dorsiflexion, 30 degrees plantarflexion  Subtalar motion: normal    Stability Stable    Muscle Strength 5/5 tibialis anterior  5/5 gastrocnemius-soleus  5/5 posterior tibialis  3+/5 peroneal/eversion strength  5/5 EHL  5/5 FHL    Neurologic Normal    Sensation  Intact to light touch throughout sural, saphenous, superficial peroneal, deep peroneal and medial/lateral plantar nerve distributions.  Ewing-Darin 5.07 filament (10g) testing  deferred.    Cardiovascular Brisk capillary refill < 2 seconds,intact DP and PT pulses    Special Tests Negative anterior  neutral and plantarflexion      Imaging Studies:   3 views of the right foot were taken, reviewed and interpreted independently that demonstrate avulsion fracture of navicular at dorsal aspect. Reviewed by me personally.    MRI of right foot was available for review from 12/8/23 which demonstrates age indeterminate avulsion fracture of navicular. Reviewed by me personally.      Scribe Attestation      I,:  Myron Whyte PA-C am acting as a scribe while in the presence of the attending physician.:       I,:  James R Lachman, MD personally performed the services described in this documentation    as scribed in my presence.:               James R. Lachman, MD  Foot & Ankle Surgery   Department of Orthopaedic Surgery  [facility]      I personally performed the service.    James R. Lachman, MD   Attending with

## 2023-12-20 NOTE — TELEPHONE ENCOUNTER
Caller: Oscar navarro    Doctor: Shreya    Reason for call: calling to request PT script to be faxed. I advised once order is in, we will fax it over.    Call back#: n/a

## 2023-12-20 NOTE — TELEPHONE ENCOUNTER
Caller: Renee    Doctor/Office: Shreya MICHAEL#: [phone]    What needs to be faxed: Needs an updated order to continue with Physical therapy    ATTN to: Laz rowell     Fax#: [phone]

## 2023-12-26 NOTE — TELEPHONE ENCOUNTER
Caller: Patient  Doctor: Shreya     Reason for call: States they did not fax over PT script   Fax- [phone]

## 2024-01-10 ENCOUNTER — APPOINTMENT (OUTPATIENT)
Dept: LAB | Facility: CLINIC | Age: 39
End: 2024-01-10
Payer: COMMERCIAL

## 2024-01-10 ENCOUNTER — OFFICE VISIT (OUTPATIENT)
Dept: RHEUMATOLOGY | Facility: CLINIC | Age: 39
End: 2024-01-10
Payer: COMMERCIAL

## 2024-01-10 ENCOUNTER — OFFICE VISIT (OUTPATIENT)
Dept: OBGYN CLINIC | Facility: MEDICAL CENTER | Age: 39
End: 2024-01-10
Payer: OTHER MISCELLANEOUS

## 2024-01-10 VITALS
BODY MASS INDEX: 43.19 KG/M2 | HEART RATE: 78 BPM | SYSTOLIC BLOOD PRESSURE: 116 MMHG | DIASTOLIC BLOOD PRESSURE: 70 MMHG | WEIGHT: 220 LBS | HEIGHT: 60 IN

## 2024-01-10 VITALS
HEART RATE: 80 BPM | WEIGHT: 225 LBS | DIASTOLIC BLOOD PRESSURE: 68 MMHG | BODY MASS INDEX: 44.17 KG/M2 | OXYGEN SATURATION: 98 % | HEIGHT: 60 IN | SYSTOLIC BLOOD PRESSURE: 116 MMHG

## 2024-01-10 DIAGNOSIS — M79.641 BILATERAL HAND PAIN: ICD-10-CM

## 2024-01-10 DIAGNOSIS — S93.491D SPRAIN OF ANTERIOR TALOFIBULAR LIGAMENT OF RIGHT ANKLE, SUBSEQUENT ENCOUNTER: ICD-10-CM

## 2024-01-10 DIAGNOSIS — M79.642 BILATERAL HAND PAIN: ICD-10-CM

## 2024-01-10 DIAGNOSIS — G89.29 CHRONIC PAIN OF RIGHT ANKLE: ICD-10-CM

## 2024-01-10 DIAGNOSIS — M25.571 CHRONIC PAIN OF RIGHT ANKLE: ICD-10-CM

## 2024-01-10 DIAGNOSIS — M79.10 MYALGIA: ICD-10-CM

## 2024-01-10 DIAGNOSIS — M19.90 INFLAMMATORY ARTHRITIS: Primary | ICD-10-CM

## 2024-01-10 DIAGNOSIS — E55.9 VITAMIN D DEFICIENCY: ICD-10-CM

## 2024-01-10 DIAGNOSIS — S92.251D CLOSED AVULSION FRACTURE OF NAVICULAR BONE OF RIGHT FOOT WITH ROUTINE HEALING, SUBSEQUENT ENCOUNTER: Primary | ICD-10-CM

## 2024-01-10 LAB
25(OH)D3 SERPL-MCNC: 15.1 NG/ML (ref 30–100)
BASOPHILS # BLD AUTO: 0.02 THOUSANDS/ÂΜL (ref 0–0.1)
BASOPHILS NFR BLD AUTO: 0 % (ref 0–1)
EOSINOPHIL # BLD AUTO: 0.12 THOUSAND/ÂΜL (ref 0–0.61)
EOSINOPHIL NFR BLD AUTO: 2 % (ref 0–6)
ERYTHROCYTE [DISTWIDTH] IN BLOOD BY AUTOMATED COUNT: 14.5 % (ref 11.6–15.1)
ERYTHROCYTE [SEDIMENTATION RATE] IN BLOOD: 32 MM/HOUR (ref 0–19)
HCT VFR BLD AUTO: 39.7 % (ref 34.8–46.1)
HGB BLD-MCNC: 12.2 G/DL (ref 11.5–15.4)
IMM GRANULOCYTES # BLD AUTO: 0.02 THOUSAND/UL (ref 0–0.2)
IMM GRANULOCYTES NFR BLD AUTO: 0 % (ref 0–2)
LYMPHOCYTES # BLD AUTO: 2.11 THOUSANDS/ÂΜL (ref 0.6–4.47)
LYMPHOCYTES NFR BLD AUTO: 30 % (ref 14–44)
MCH RBC QN AUTO: 28 PG (ref 26.8–34.3)
MCHC RBC AUTO-ENTMCNC: 30.7 G/DL (ref 31.4–37.4)
MCV RBC AUTO: 91 FL (ref 82–98)
MONOCYTES # BLD AUTO: 0.5 THOUSAND/ÂΜL (ref 0.17–1.22)
MONOCYTES NFR BLD AUTO: 7 % (ref 4–12)
NEUTROPHILS # BLD AUTO: 4.25 THOUSANDS/ÂΜL (ref 1.85–7.62)
NEUTS SEG NFR BLD AUTO: 61 % (ref 43–75)
NRBC BLD AUTO-RTO: 0 /100 WBCS
PLATELET # BLD AUTO: 370 THOUSANDS/UL (ref 149–390)
PMV BLD AUTO: 10.2 FL (ref 8.9–12.7)
RBC # BLD AUTO: 4.36 MILLION/UL (ref 3.81–5.12)
WBC # BLD AUTO: 7.02 THOUSAND/UL (ref 4.31–10.16)

## 2024-01-10 PROCEDURE — 82306 VITAMIN D 25 HYDROXY: CPT | Performed by: INTERNAL MEDICINE

## 2024-01-10 PROCEDURE — 99214 OFFICE O/P EST MOD 30 MIN: CPT | Performed by: INTERNAL MEDICINE

## 2024-01-10 PROCEDURE — 85652 RBC SED RATE AUTOMATED: CPT | Performed by: INTERNAL MEDICINE

## 2024-01-10 PROCEDURE — 99213 OFFICE O/P EST LOW 20 MIN: CPT | Performed by: EMERGENCY MEDICINE

## 2024-01-10 PROCEDURE — 36415 COLL VENOUS BLD VENIPUNCTURE: CPT | Performed by: INTERNAL MEDICINE

## 2024-01-10 PROCEDURE — 80053 COMPREHEN METABOLIC PANEL: CPT | Performed by: INTERNAL MEDICINE

## 2024-01-10 PROCEDURE — 86140 C-REACTIVE PROTEIN: CPT | Performed by: INTERNAL MEDICINE

## 2024-01-10 PROCEDURE — 85025 COMPLETE CBC W/AUTO DIFF WBC: CPT | Performed by: INTERNAL MEDICINE

## 2024-01-10 RX ORDER — CYCLOBENZAPRINE HCL 5 MG
5 TABLET ORAL
Qty: 30 TABLET | Refills: 6 | Status: SHIPPED | OUTPATIENT
Start: 2024-01-10

## 2024-01-10 RX ORDER — CELECOXIB 200 MG/1
200 CAPSULE ORAL 2 TIMES DAILY
Qty: 180 CAPSULE | Refills: 1 | Status: SHIPPED | OUTPATIENT
Start: 2024-01-10

## 2024-01-10 NOTE — PATIENT INSTRUCTIONS
Do labs  Continue celecoxib 200mg twice a day as needed for joint pain  Restart cyclobenzaprine 5mg at bedtime as needed for muscle pain  Continue weekly Vit. D  Try diclofenac gel as needed for joint pain  Will consider starting sulfasalazine for long-term control of inflammation     Return to clinic in 6 months

## 2024-01-10 NOTE — LETTER
January 10, 2024     Patient: Renee Wall  YOB: 1985  Date of Visit: 1/10/2024      To Whom it May Concern:    Renee Wall is under my professional care. Renee was seen in my office on 1/10/2024. Work excuse until further notice.  Continue PT.  F/U 4 weeks.    If you have any questions or concerns, please don't hesitate to call.         Sincerely,          Wally Cash MD        CC: No Recipients

## 2024-01-10 NOTE — PROGRESS NOTES
"    Assessment/Plan:    Diagnoses and all orders for this visit:    Closed avulsion fracture of navicular bone of right foot with routine healing, subsequent encounter  -     Ambulatory Referral to Physical Therapy; Future    Sprain of anterior talofibular ligament of right ankle, subsequent encounter  -     Ambulatory Referral to Physical Therapy; Future    Chronic pain of right ankle  -     Ambulatory Referral to Physical Therapy; Future    S/p Ortho eval note reviewed  Continue WBAT in supportive shoe/sneaker, PT  Out of work note provided, patient states there is no light duty  Patient has what is likely TIKI scheduled      Return in about 4 weeks (around 2/7/2024).      Subjective:   Patient ID: Renee Wall is a 38 y.o. female.    WC 8/11/23    Patient returns feeling 50% baseline, she notes medial and anterior ankle pain especially with standing 15 minutes having to rest and sit.    s/p Ortho consult who recommended \"  · Her MRI does not show any surgical issue.  The avulsion fracture of her navicular was noted on Xray and also seen on MRI.  · Her right ankle is significantly weak in all planes compared to the contralateral ankle  · Continue PT/HEP as directed   · WBAT in supportive sneaker   · Compression sock for swelling control   She is scheduled to see other physicians possibly TIKI.      Previous note:  Patient returns to review MRI results from [facility].  She still has pain medial and anterior ankle.  She's utilizing ankle brace.      Previous note:  Patient returns with continued pain, she started PT and had 3 total visits. She notes medial, anterior and lateral pain.  Weaned from boot into ankle brace.  Pain with WB, found she can only drive for 10 min due to increased pain.    PT at [facility]     Previous note:  Patient returns unable to transition out of CAM boot due to anterior lateral pain, she notes the lace up ankle brace pushing on her inner ankle and causing pain.    PT has " not been set up yet.      Previous note:  Patient returns wearing CAM boot and using crutches.  She notes continued pain of the dorsal proximal foot and has tried to wean from CAM.    She notes that physical therapy was scheduled on 9/13 however this was canceled as it had to be reschedule through Worker's Comp.    Initial note:  38yo female presenting to clinic for evaluation of right ankle pain. Pt works for [employer] when she tripped over step and fell last Friday 8/11/2023. She notes that she inverted her ankle during fall. She also endorses hearing a crunching sound when she fell. Pain occurs daily and is aggravated with use. Pain is 9/10 but subsides to 6/10 with NSAIDs. She also puts on on foot which has helped with swelling.     Ankle Injury  This is a new problem. The current episode started 1 to 4 weeks ago. The problem occurs daily. The problem has been gradually improving. Associated symptoms include numbness (plantar aspect of big toe) and weakness. The symptoms are aggravated by standing and walking. She has tried NSAIDs, ice and relaxation for the symptoms. The treatment provided mild relief.            Review of Systems    The following portions of the patient's chart were reviewed and updated as appropriate:   Allergy:  No Known Allergies    Medications:    Current Outpatient Medications:     albuterol (2.5 mg/3 mL) 0.083 % nebulizer solution, Take 3 mL (2.5 mg total) by nebulization every 6 (six) hours as needed for wheezing or shortness of breath, Disp: 75 mL, Rfl: 3    albuterol (PROVENTIL HFA,VENTOLIN HFA) 90 mcg/act inhaler, Inhale 2 puffs every 4 (four) hours as needed (as needed), Disp: 18 g, Rfl: 2    budesonide-formoterol (SYMBICORT) 160-4.5 mcg/act inhaler, Inhale 2 puffs 2 (two) times a day Rinse mouth after use., Disp: 30.6 g, Rfl: 1    ergocalciferol (VITAMIN D2) 50,000 units, Take 1 capsule (50,000 Units total) by mouth once a week, Disp: 12 capsule, Rfl: 1     fexofenadine-pseudoephedrine (Allegra-D Allergy & Congestion) 180-240 MG per 24 hr tablet, Take 1 tablet by mouth daily, Disp: 90 tablet, Rfl: 1    omeprazole (PriLOSEC) 40 MG capsule, Take 1 capsule (40 mg total) by mouth daily, Disp: 90 capsule, Rfl: 1    semaglutide, 0.25 or 0.5 mg/dose, (Ozempic, 0.25 or 0.5 MG/DOSE,) 2 mg/3 mL injection pen, 0.25 mg under the skin every 7 days for 4 doses (28 days), THEN 0.5 mg under the skin every 7 days (Patient not taking: Reported on 1/10/2024), Disp: 9 mL, Rfl: 0    celecoxib (CeleBREX) 200 mg capsule, Take 1 capsule (200 mg total) by mouth 2 (two) times a day, Disp: 180 capsule, Rfl: 1    cyclobenzaprine (FLEXERIL) 5 mg tablet, Take 1 tablet (5 mg total) by mouth daily at bedtime, Disp: 30 tablet, Rfl: 6    Diclofenac Sodium (VOLTAREN) 1 %, Apply 2 g topically 4 (four) times a day, Disp: 100 g, Rfl: 6    Patient Active Problem List   Diagnosis    Asthma, mild persistent    BPPV (benign paroxysmal positional vertigo)    Mild single current episode of major depressive disorder (HCC)    Seasonal allergies    History of anxiety disorder    Primary insomnia    Chronic arthralgias of knees and hips    Myalgia    Chronic bilateral low back pain without sciatica    Gastroesophageal reflux disease without esophagitis    Severe obesity with body mass index (BMI) of 35.0 to 39.9 with comorbidity (HCC)    Left breast mass    Abnormal finding on breast imaging    Family history of breast cancer    Family history of ovarian cancer    Bilateral hand pain    Right ankle sprain    Avulsion fracture of navicular bone of right foot    Sprain of anterior talofibular ligament of right ankle, subsequent encounter    Closed avulsion fracture of navicular bone of right foot with routine healing, subsequent encounter       Objective:  /70   Pulse 78   Ht 5' (1.524 m)   Wt 99.8 kg (220 lb)   BMI 42.97 kg/m²     Ortho Exam    Physical Exam      Neurologic Exam    Procedures    I have  personally reviewed the written report of the pertinent studies.             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     Depression     GERD (gastroesophageal reflux disease)     Obesity     Scoliosis     Visual impairment        Past Surgical History:   Procedure Laterality Date    HERNIA REPAIR         Social History     Socioeconomic History    Marital status: Single     Spouse name: Not on file    Number of children: Not on file    Years of education: Not on file    Highest education level: Not on file   Occupational History    Occupation: unemployed   Tobacco Use    Smoking status: Former     Current packs/day: 0.00     Types: Cigarettes     Quit date:      Years since quittin.0    Smokeless tobacco: Never   Vaping Use    Vaping status: Never Used   Substance and Sexual Activity    Alcohol use: Yes     Comment: occasionally    Drug use: Never    Sexual activity: Yes     Partners: Male   Other Topics Concern    Not on file   Social History Narrative    Not on file     Social Determinants of Health     Financial Resource Strain: Low Risk  (2020)    Overall Financial Resource Strain (CARDIA)     Difficulty of Paying Living Expenses: Not hard at all   Food Insecurity: No Food Insecurity (2020)    Hunger Vital Sign     Worried About Running Out of Food in the Last Year: Never true     Ran Out of Food in the Last Year: Never true   Transportation Needs: No Transportation Needs (2020)    PRAPARE - Transportation     Lack of Transportation (Medical): No     Lack of Transportation (Non-Medical): No   Physical Activity: Insufficiently Active (2020)    Exercise Vital Sign     Days of Exercise per Week: 3 days     Minutes of Exercise per Session: 30 min   Stress: No Stress Concern Present (2020)    [place] [employer] - Occupational Stress Questionnaire     Feeling of Stress : Not at all    Social Connections: Moderately Isolated (7/23/2020)    Social Connection and Isolation Panel [NHANES]     Frequency of Communication with Friends and Family: More than three times a week     Frequency of Social Gatherings with Friends and Family: More than three times a week     Attends Buddhism Services: 1 to 4 times per year     Active Member of Clubs or Organizations: No     Attends Club or Organization Meetings: Never     Marital Status: Never    Intimate Partner Violence: Not At Risk (7/23/2020)    Humiliation, Afraid, Rape, and Kick questionnaire     Fear of Current or Ex-Partner: No     Emotionally Abused: No     Physically Abused: No     Sexually Abused: No   Housing Stability: Not on file       Family History   Problem Relation Age of Onset    Thyroid disease Mother     Arthritis Mother     Diabetes Mother     Heart attack Mother     Depression Father     Arthritis Father     No Known Problems Sister     No Known Problems Brother     No Known Problems Son     No Known Problems Daughter     Coronary artery disease Maternal Grandmother     Hyperlipidemia Maternal Grandmother     Thyroid disease Maternal Grandmother     Asthma Maternal Grandmother     Arthritis Maternal Grandmother     Vision loss Maternal Grandmother     Breast cancer Maternal Grandmother         age unknown    Diabetes Maternal Grandmother     Hyperlipidemia Maternal Grandfather     Thyroid disease Maternal Grandfather     Diabetes Maternal Grandfather     No Known Problems Paternal Grandmother     No Known Problems Paternal Grandfather     Ovarian cancer Maternal Aunt         50s    Diabetes Maternal Aunt     Colon cancer Maternal Aunt     Diabetes Maternal Aunt     Diabetes Maternal Uncle     Diabetes Maternal Uncle     Sickle cell anemia Paternal Aunt     Sickle cell anemia Paternal Aunt     No Known Problems Paternal Aunt     No Known Problems Paternal Aunt     No Known Problems Paternal Aunt     No Known Problems Paternal Aunt      No Known Problems Paternal Uncle     No Known Problems Paternal Uncle     Sickle cell anemia Paternal Uncle

## 2024-01-10 NOTE — PROGRESS NOTES
Assessment and Plan:   Renee Wall is a 38 y.o.  female who presents for follow up of her likely inflammatory arthritis, possible seronegative Rheumatoid arthritis.  Was asymptomatic when she had a hand ultrasound done in June 2022, which did not reveal active synovitis.  However, she seems to currently have active symptoms.  Patient has tenderness of right MCPs and PIPs and left PIPs on physical exam.  Had side effects on hydroxychloroquine.    Do labs, inflammatory markers returned elevated  Continue celecoxib 200mg twice a day as needed for joint pain  Restart cyclobenzaprine 5mg at bedtime as needed for muscle pain  Continue weekly Vit. D  Try diclofenac gel as needed for joint pain  Initiated sulfasalazine for long-term control of inflammation  Also initiated ergocalciferol 50,000 units weekly for her vitamin D deficiency     Return to clinic in 6 months     Plan:  Diagnoses and all orders for this visit:    Inflammatory arthritis  -     CBC and differential  -     Comprehensive metabolic panel  -     Sedimentation rate, automated  -     C-reactive protein  -     Diclofenac Sodium (VOLTAREN) 1 %; Apply 2 g topically 4 (four) times a day    Bilateral hand pain  -     Diclofenac Sodium (VOLTAREN) 1 %; Apply 2 g topically 4 (four) times a day  -     celecoxib (CeleBREX) 200 mg capsule; Take 1 capsule (200 mg total) by mouth 2 (two) times a day    Vitamin D deficiency  -     Vitamin D 25 hydroxy    Myalgia  -     cyclobenzaprine (FLEXERIL) 5 mg tablet; Take 1 tablet (5 mg total) by mouth daily at bedtime    Follow-up plan: RTC in 6 months            Rheumatic Disease Summary  Initial visit 11/4/21: Patient presented as a Rheumatology consult referred by Cb Barclay DO for evaluation of bilateral hand pain and swelling since she was about 27 years old.  Had diffuse tenderness on physical exam along with hand tenderness; also had significantly elevated ESR and CRP.  There was a possibility the patient could have  an inflammatory arthritis such as seronegative Rheumatoid arthritis as well as fibromyalgia symptoms. Hand xrays were ordered, pending the results, would dertermine what long-term DMARD medication to start patient on; for time-being, started prednisone taper. Labs ordered, RF and anti-CCP were negative; ESR and CRP returned elevated, vitamin-D was very low, for which I prescribed ergocalciferol 53132 units p.o. weekly. Stopped diclofenac and try celebrex 200mg twice daily. She could take cyclobenzaprine 5mg at bedtime.      Last visit 5/13/22: Patient presents for follow-up of her likely inflammatory arthritis, possible seronegative Rheumatoid arthritis. Hand x-rays unremarkable but patient having persistently elevated inflammatory markers and hand joint symptoms; had positive response to prednisone. Bilateral hand ultrasound ordered to evaluate for synovitis.  Do labs  Schedule hand ultrasound  Increase celecoxib to 200mg twice a day as needed for joint pain  Continue cyclobenzaprine 5mg at bedtime as needed for muscle pain  Continue weekly Vit. D  Initiate HCQ 200mg po bid; get regular eye exams     HPI  Renee Wall is a 38 y.o.  female who presents for follow up of her likely inflammatory arthritis. She admits to 30 minutes of morning stiffness in legs and hands.    The following portions of the patient's history were reviewed and updated as appropriate: allergies, current medications, past family history, past medical history, past social history, past surgical history and problem list.    Review of Systems:   Review of Systems   Constitutional:  Negative for fatigue.   HENT:  Positive for sinus pressure and sinus pain. Negative for mouth sores.    Eyes:  Negative for pain.   Respiratory:  Negative for shortness of breath.    Cardiovascular:  Negative for leg swelling.   Gastrointestinal:         Indigestion/heartburn   Musculoskeletal:  Positive for arthralgias, back pain, joint swelling and myalgias.   Skin:   Negative for rash.   Neurological:  Positive for weakness and numbness.   Hematological:  Negative for adenopathy.   Psychiatric/Behavioral:  Negative for sleep disturbance.      Reviewed and agree.    Home Medications:    Current Outpatient Medications:     albuterol (2.5 mg/3 mL) 0.083 % nebulizer solution, Take 3 mL (2.5 mg total) by nebulization every 6 (six) hours as needed for wheezing or shortness of breath, Disp: 75 mL, Rfl: 3    albuterol (PROVENTIL HFA,VENTOLIN HFA) 90 mcg/act inhaler, Inhale 2 puffs every 4 (four) hours as needed (as needed), Disp: 18 g, Rfl: 2    budesonide-formoterol (SYMBICORT) 160-4.5 mcg/act inhaler, Inhale 2 puffs 2 (two) times a day Rinse mouth after use., Disp: 30.6 g, Rfl: 1    celecoxib (CeleBREX) 200 mg capsule, Take 1 capsule (200 mg total) by mouth 2 (two) times a day, Disp: 180 capsule, Rfl: 1    cyclobenzaprine (FLEXERIL) 5 mg tablet, Take 1 tablet (5 mg total) by mouth daily at bedtime, Disp: 30 tablet, Rfl: 6    Diclofenac Sodium (VOLTAREN) 1 %, Apply 2 g topically 4 (four) times a day, Disp: 100 g, Rfl: 6    ergocalciferol (VITAMIN D2) 50,000 units, Take 1 capsule (50,000 Units total) by mouth once a week, Disp: 12 capsule, Rfl: 1    fexofenadine-pseudoephedrine (Allegra-D Allergy & Congestion) 180-240 MG per 24 hr tablet, Take 1 tablet by mouth daily, Disp: 90 tablet, Rfl: 1    omeprazole (PriLOSEC) 40 MG capsule, Take 1 capsule (40 mg total) by mouth daily, Disp: 90 capsule, Rfl: 1    semaglutide, 0.25 or 0.5 mg/dose, (Ozempic, 0.25 or 0.5 MG/DOSE,) 2 mg/3 mL injection pen, 0.25 mg under the skin every 7 days for 4 doses (28 days), THEN 0.5 mg under the skin every 7 days (Patient not taking: Reported on 1/10/2024), Disp: 9 mL, Rfl: 0    Objective:    Vitals:    01/10/24 1006   BP: 116/68   Pulse: 80   SpO2: 98%   Weight: 102 kg (225 lb)   Height: 5' (1.524 m)       Physical Exam  Constitutional:       General: She is not in acute distress.  HENT:      Head:  Normocephalic and atraumatic.   Eyes:      Conjunctiva/sclera: Conjunctivae normal.   Cardiovascular:      Rate and Rhythm: Normal rate and regular rhythm.      Heart sounds: S1 normal and S2 normal.      No friction rub.   Pulmonary:      Effort: Pulmonary effort is normal. No respiratory distress.      Breath sounds: Normal breath sounds. No wheezing, rhonchi or rales.   Musculoskeletal:         General: Tenderness present.      Cervical back: Neck supple.      Comments: Right MCPs/PIPs tenderness, left PIPs tenderness   Skin:     Coloration: Skin is not pale.   Neurological:      Mental Status: She is alert. Mental status is at baseline.   Psychiatric:         Mood and Affect: Mood normal.         Behavior: Behavior normal.       Reviewed labs and imaging.    Imaging:   Bilateral hand ultrasound 6/17/22-unremarkable    CRX 2/24/22  No acute cardiopulmonary disease.    Bilateral hand x-rays from today 11/4/21 - unremarkable     6/18/21 VAS lower limb venous duplex bilateral: normal     4/23/20 XR lumbar spine: unremarkable    Labs:   No visits with results within 6 Month(s) from this visit.   Latest known visit with results is:   Admission on 04/03/2023, Discharged on 04/03/2023   Component Date Value Ref Range Status    Ventricular Rate 04/03/2023 83  BPM Final    Atrial Rate 04/03/2023 83  BPM Final    PA Interval 04/03/2023 118  ms Final    QRSD Interval 04/03/2023 86  ms Final    QT Interval 04/03/2023 374  ms Final    QTC Interval 04/03/2023 439  ms Final    P Axis 04/03/2023 37  degrees Final    QRS [place] 04/03/2023 30  degrees Final    T Wave [place] 04/03/2023 21  degrees Final    Sodium 04/03/2023 136  135 - 147 mmol/L Final    Potassium 04/03/2023 4.2  3.5 - 5.3 mmol/L Final    Chloride 04/03/2023 106  96 - 108 mmol/L Final    CO2 04/03/2023 25  21 - 32 mmol/L Final    ANION GAP 04/03/2023 5  4 - 13 mmol/L Final    BUN 04/03/2023 10  5 - 25 mg/dL Final    Creatinine 04/03/2023 0.67  0.60 - 1.30 mg/dL Final     Standardized to IDMS reference method    Glucose 04/03/2023 102  65 - 140 mg/dL Final    If the patient is fasting, the ADA then defines impaired fasting glucose as > 100 mg/dL and diabetes as > or equal to 123 mg/dL.    Calcium 04/03/2023 8.6  8.4 - 10.2 mg/dL Final    AST 04/03/2023 12 (L)  13 - 39 U/L Final    ALT 04/03/2023 12  7 - 52 U/L Final    Specimen collection should occur prior to Sulfasalazine administration due to the potential for falsely depressed results.     Alkaline Phosphatase 04/03/2023 76  34 - 104 U/L Final    Total Protein 04/03/2023 7.1  6.4 - 8.4 g/dL Final    Albumin 04/03/2023 4.0  3.5 - 5.0 g/dL Final    Total Bilirubin 04/03/2023 0.26  0.20 - 1.00 mg/dL Final    Use of this assay is not recommended for patients undergoing treatment with eltrombopag due to the potential for falsely elevated results.  N-acetyl-p-benzoquinone imine (metabolite of Acetaminophen) will generate erroneously low results in samples for patients that have taken an overdose of Acetaminophen.    eGFR 04/03/2023 113  ml/min/1.73sq m Final    Lipase 04/03/2023 29  11 - 82 u/L Final    WBC 04/03/2023 7.26  4.31 - 10.16 Thousand/uL Final    RBC 04/03/2023 4.48  3.81 - 5.12 Million/uL Final    Hemoglobin 04/03/2023 12.7  11.5 - 15.4 g/dL Final    Hematocrit 04/03/2023 40.3  34.8 - 46.1 % Final    MCV 04/03/2023 90  82 - 98 fL Final    MCH 04/03/2023 28.3  26.8 - 34.3 pg Final    MCHC 04/03/2023 31.5  31.4 - 37.4 g/dL Final    RDW 04/03/2023 13.9  11.6 - 15.1 % Final    MPV 04/03/2023 9.8  8.9 - 12.7 fL Final    Platelets 04/03/2023 339  149 - 390 Thousands/uL Final    nRBC 04/03/2023 0  /100 WBCs Final    Neutrophils Relative 04/03/2023 60  43 - 75 % Final    Immat GRANS % 04/03/2023 0  0 - 2 % Final    Lymphocytes Relative 04/03/2023 32  14 - 44 % Final    Monocytes Relative 04/03/2023 7  4 - 12 % Final    Eosinophils Relative 04/03/2023 1  0 - 6 % Final    Basophils Relative 04/03/2023 0  0 - 1 % Final     "Neutrophils Absolute 04/03/2023 4.22  1.85 - 7.62 Thousands/µL Final    Immature Grans Absolute 04/03/2023 0.03  0.00 - 0.20 Thousand/uL Final    Lymphocytes Absolute 04/03/2023 2.35  0.60 - 4.47 Thousands/µL Final    Monocytes Absolute 04/03/2023 0.54  0.17 - 1.22 Thousand/µL Final    Eosinophils Absolute 04/03/2023 0.09  0.00 - 0.61 Thousand/µL Final    Basophils Absolute 04/03/2023 0.03  0.00 - 0.10 Thousands/µL Final    hs TnI 0hr 04/03/2023 <2  \"Refer to ACS Flowchart\"- see link ng/L Final    Comment:                                              Initial (time 0) result  If >=50 ng/L, Myocardial injury suggested ;  Type of myocardial injury and treatment strategy  to be determined.  If 5-49 ng/L, a delta result at 2 hours and or 4 hours will be needed to further evaluate.  If <4 ng/L, and chest pain has been >3 hours since onset, patient may qualify for discharge based on the HEART score in the ED.  If <5 ng/L and <3hours since onset of chest pain, a delta result at 2 hours will be needed to further evaluate.    HS Troponin 99th Percentile URL of a Health Population=12 ng/L with a 95% Confidence Interval of 8-18 ng/L.    Second Troponin (time 2 hours)  If calculated delta >= 20 ng/L,  Myocardial injury suggested ; Type of myocardial injury and treatment strategy to be determined.  If 5-49 ng/L and the calculated delta is 5-19 ng/L, consult medical service for evaluation.  Continue evaluation for ischemia on ecg and other possible etiology and repeat hs troponin at 4 hours.  If delta                            is <5 ng/L at 2 hours, consider discharge based on risk stratification via the HEART score (if in ED), or PRAMOD risk score in IP/Observation.    HS Troponin 99th Percentile URL of a Health Population=12 ng/L with a 95% Confidence Interval of 8-18 ng/L.    EXT Preg Test, Ur 04/03/2023 Negative   Final    Control 04/03/2023 Valid   Final       "

## 2024-01-11 ENCOUNTER — TELEPHONE (OUTPATIENT)
Age: 39
End: 2024-01-11

## 2024-01-11 LAB
ALBUMIN SERPL BCP-MCNC: 4.3 G/DL (ref 3.5–5)
ALP SERPL-CCNC: 81 U/L (ref 34–104)
ALT SERPL W P-5'-P-CCNC: 12 U/L (ref 7–52)
ANION GAP SERPL CALCULATED.3IONS-SCNC: 7 MMOL/L
AST SERPL W P-5'-P-CCNC: 14 U/L (ref 13–39)
BILIRUB SERPL-MCNC: 0.38 MG/DL (ref 0.2–1)
BUN SERPL-MCNC: 8 MG/DL (ref 5–25)
CALCIUM SERPL-MCNC: 9.9 MG/DL (ref 8.4–10.2)
CHLORIDE SERPL-SCNC: 105 MMOL/L (ref 96–108)
CO2 SERPL-SCNC: 26 MMOL/L (ref 21–32)
CREAT SERPL-MCNC: 0.66 MG/DL (ref 0.6–1.3)
CRP SERPL QL: 8.9 MG/L
GFR SERPL CREATININE-BSD FRML MDRD: 113 ML/MIN/1.73SQ M
GLUCOSE P FAST SERPL-MCNC: 85 MG/DL (ref 65–99)
POTASSIUM SERPL-SCNC: 4.2 MMOL/L (ref 3.5–5.3)
PROT SERPL-MCNC: 7.1 G/DL (ref 6.4–8.4)
SODIUM SERPL-SCNC: 138 MMOL/L (ref 135–147)

## 2024-01-11 NOTE — TELEPHONE ENCOUNTER
Caller: Zoe RAUSCH     Doctor/Office: Shreya MICHAEL#: [phone]      What needs to be faxed: THOMAS 1/10/24    ATTN to: N/A    Fax#: [phone]      Documents were successfully e-faxed

## 2024-01-12 ENCOUNTER — APPOINTMENT (OUTPATIENT)
Dept: LAB | Facility: HOSPITAL | Age: 39
End: 2024-01-12
Payer: COMMERCIAL

## 2024-01-12 ENCOUNTER — PATIENT MESSAGE (OUTPATIENT)
Dept: BARIATRICS | Facility: CLINIC | Age: 39
End: 2024-01-12

## 2024-01-12 ENCOUNTER — TELEPHONE (OUTPATIENT)
Age: 39
End: 2024-01-12

## 2024-01-12 ENCOUNTER — OFFICE VISIT (OUTPATIENT)
Dept: BARIATRICS | Facility: CLINIC | Age: 39
End: 2024-01-12
Payer: COMMERCIAL

## 2024-01-12 VITALS
RESPIRATION RATE: 16 BRPM | BODY MASS INDEX: 42.48 KG/M2 | DIASTOLIC BLOOD PRESSURE: 74 MMHG | HEART RATE: 81 BPM | HEIGHT: 61 IN | SYSTOLIC BLOOD PRESSURE: 110 MMHG | WEIGHT: 225 LBS

## 2024-01-12 DIAGNOSIS — Z13.1 SCREENING FOR DIABETES MELLITUS: ICD-10-CM

## 2024-01-12 DIAGNOSIS — Z13.29 SCREENING FOR THYROID DISORDER: ICD-10-CM

## 2024-01-12 DIAGNOSIS — Z13.220 SCREENING FOR LIPID DISORDERS: ICD-10-CM

## 2024-01-12 DIAGNOSIS — E66.01 OBESITY, CLASS III, BMI 40-49.9 (MORBID OBESITY) (HCC): Primary | ICD-10-CM

## 2024-01-12 DIAGNOSIS — K21.9 GASTROESOPHAGEAL REFLUX DISEASE WITHOUT ESOPHAGITIS: ICD-10-CM

## 2024-01-12 DIAGNOSIS — E66.01 OBESITY, CLASS III, BMI 40-49.9 (MORBID OBESITY) (HCC): ICD-10-CM

## 2024-01-12 DIAGNOSIS — R73.03 PREDIABETES: ICD-10-CM

## 2024-01-12 DIAGNOSIS — E66.01 SEVERE OBESITY WITH BODY MASS INDEX (BMI) OF 35.0 TO 39.9 WITH COMORBIDITY (HCC): ICD-10-CM

## 2024-01-12 LAB
CHOLEST SERPL-MCNC: 133 MG/DL
EST. AVERAGE GLUCOSE BLD GHB EST-MCNC: 126 MG/DL
HBA1C MFR BLD: 6 %
HDLC SERPL-MCNC: 37 MG/DL
INSULIN SERPL-ACNC: 9.56 UIU/ML (ref 1.9–23)
LDLC SERPL CALC-MCNC: 79 MG/DL (ref 0–100)
TRIGL SERPL-MCNC: 87 MG/DL
TSH SERPL DL<=0.05 MIU/L-ACNC: 2.07 UIU/ML (ref 0.45–4.5)

## 2024-01-12 PROCEDURE — 84443 ASSAY THYROID STIM HORMONE: CPT

## 2024-01-12 PROCEDURE — 99203 OFFICE O/P NEW LOW 30 MIN: CPT | Performed by: NURSE PRACTITIONER

## 2024-01-12 PROCEDURE — 83036 HEMOGLOBIN GLYCOSYLATED A1C: CPT

## 2024-01-12 PROCEDURE — 36415 COLL VENOUS BLD VENIPUNCTURE: CPT

## 2024-01-12 PROCEDURE — 83525 ASSAY OF INSULIN: CPT

## 2024-01-12 PROCEDURE — 80061 LIPID PANEL: CPT

## 2024-01-12 NOTE — PROGRESS NOTES
Assessment/Plan:    Obesity, Class III, BMI 40-49.9 (morbid obesity) (HCC)  - Discussed options of HealthyCORE-Intensive Lifestyle Intervention Program, Very Low Calorie Diet-VLCD, Conservative Program, Tiffany-En-Y Gastric Bypass, and Vertical Sleeve Gastrectomy and the role of weight loss medications.  - Not currently interested in weight loss surgery.  - Explained the importance of making lifestyle changes with anti-obesity medications.  - Patient is interested in pursuing Conservative Program  - Initial weight loss goal of 5-10% weight loss for improved health  - Weight loss can improve patient's co-morbid conditions and/or prevent weight-related complications.  - Interested in weight loss medication to help with appetite and cravings.   - FDA approved weight loss medications reviewed: Wegovy, Saxenda, Zepbound, Qsymia, Contrave, and Phentermine. Wegovy and Saxenda shortage discussed. Off label use of medications discussed.   - On phentermine in the past and caused chest pain and palpitations.   - Contrave in the past caused headaches.   - Patient denies personal history of pancreatitis. Patient also denies personal and family history of thyroid cancer and multiple endocrine neoplasia type 2 (MEN 2 tumor).   - Denies history of kidney stones, gallstones, seizures, or glaucoma.   - Advised to use contraception while on weight loss medication, as pregnancy not recommended while taking weight loss medication. Reviewed with her that as she loses weight, fertility can increase. If she wants to conceive in the future, advised she should be off of all weight loss medications for at least 3 months prior to trying to conceive.   She will check coverage of medication and update me. Leaning towards Zepbound and Saxenda.   - Medication contract signed.  - Stop Bang 2/8  - Labs reviewed: CMP and CBC 1/10/2024 were within acceptable range.   - Check TSH, lipid, A1C, and fasting insulin.       Goals:  Do not skip meals.  Food log  (ie.) www.myfitnesspal.com,[employer].com,loseit.com,PeerIndex.com,etc. baritastic (use skinnytaste.com, [employer] or smartphone lay [employer] for recipes)  No sugary beverages. At least 64oz of water daily.  Increase physical activity by 10 minutes daily. Gradually increase physical activity to a goal of 5 days per week for 30 minutes of MODERATE intensity PLUS 2 days per week of FULL BODY resistance training (use smartphone apps Pixafy, Home Workout, etc.)  Start food logging, weighing and measuring food.   1300 calories per day. Sample menu given.   Increase water intake to at least 64 oz daily.   Reduce and ideally eliminate sugar beverages.   Increase exercise as tolerated - currently in PT for ankle pain.  Increase sleep closer to 7-8 hours per night.    Gastroesophageal reflux disease without esophagitis  - Taking prilosec. May improve with weight loss and lifestyle modification. Continue management with prescribing provider.          Renee was seen today for consult.    Diagnoses and all orders for this visit:    Obesity, Class III, BMI 40-49.9 (morbid obesity) (HCC)  -     TSH, 3rd generation; Future  -     Lipid Panel with Direct LDL reflex; Future  -     HEMOGLOBIN A1C W/ EAG ESTIMATION; Future  -     Insulin, fasting; Future    Severe obesity with body mass index (BMI) of 35.0 to 39.9 with comorbidity (HCC)  -     Ambulatory Referral to Weight Management    Screening for diabetes mellitus  -     HEMOGLOBIN A1C W/ EAG ESTIMATION; Future  -     Insulin, fasting; Future    Screening for lipid disorders  -     Lipid Panel with Direct LDL reflex; Future    Screening for thyroid disorder  -     TSH, 3rd generation; Future    Gastroesophageal reflux disease without esophagitis        Total time spent: 40 min, with >50% face-to-face time spent counseling patient on nonsurgical interventions for the treatment of excess weight. Discussed in detail nonsurgical options including intensive lifestyle  intervention program, very low-calorie diet program and conservative program.  Discussed the role of weight loss medications.  Counseled patient on diet behavior and exercise modification for weight loss.          Follow up in approximately  2 month nurse visit and 4 months  with Non-Surgical Physician/Advanced Practitioner.    Subjective:   Chief Complaint   Patient presents with    Consult     MWM Consult; Gw-165lb ; Waist-47in; Stop Bang-2/8       Patient ID: Renee Wall  is a 38 y.o. female with excess weight/obesity here to pursue weight management. She presents to the office visit with her boyfriend.  Previous notes and records have been reviewed.    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     Depression     GERD (gastroesophageal reflux disease)     Obesity     Scoliosis     Visual impairment      Past Surgical History:   Procedure Laterality Date    HERNIA REPAIR         HPI:  Wt Readings from Last 20 Encounters:   01/12/24 102 kg (225 lb)   01/10/24 102 kg (225 lb)   01/10/24 99.8 kg (220 lb)   12/20/23 99.8 kg (220 lb)   12/15/23 99.8 kg (220 lb)   11/10/23 99.8 kg (220 lb)   10/26/23 99.8 kg (220 lb)   10/13/23 95.3 kg (210 lb)   09/15/23 95.3 kg (210 lb)   08/18/23 95.3 kg (210 lb)   05/15/23 95.3 kg (210 lb)   04/03/23 94 kg (207 lb 3.7 oz)   06/24/22 98.9 kg (218 lb)   05/13/22 98.9 kg (218 lb)   11/15/21 98.9 kg (218 lb)   11/04/21 99.8 kg (220 lb)   11/03/21 99.8 kg (220 lb)   07/28/21 99.8 kg (220 lb)   06/18/21 98 kg (216 lb)   05/03/21 94.3 kg (208 lb)     Obesity/Excess Weight:  Severity: Severe  Onset:  1.5 years    Modifiers: Diet and Exercise and Prescription Weight Loss Medications  Contributing factors: Insufficient Physical Activity, Stress/Emotional Eating, and Depression  Associated symptoms: decreased self esteem, depression, and clothes do not fit    Was on Phentermine about 2 years ago, but had chest pain and  palpitations.    Tried Contrave - had headaches from it.      Appetite increased. Cravings for sugar. Stress eating.     Hydration: 1 bottle of water, 2 cups juice, 4 cups ginger ale  Alcohol: rare  Smoking: denies  Exercise: PT twice a week one hour for ankle pain  Occupation: works for [employer]  Sleep: 3 hours  STOP ban/8    Highest weight: current   Current weight: 225 lbs BMI 43.94  Goal weight: 165 lbs    Colonoscopy: N/A  Mammogram: N/A    The following portions of the patient's history were reviewed and updated as appropriate: allergies, current medications, past family history, past medical history, past social history, past surgical history, and problem list.    Family History   Problem Relation Age of Onset    Heart disease Mother     Thyroid disease Mother     Arthritis Mother     Diabetes Mother     Heart attack Mother     Depression Father     Arthritis Father     No Known Problems Sister     No Known Problems Brother     No Known Problems Daughter     No Known Problems Son     Ovarian cancer Maternal Aunt         50s    Diabetes Maternal Aunt     Colon cancer Maternal Aunt     Diabetes Maternal Aunt     Diabetes Maternal Uncle     Diabetes Maternal Uncle     Sickle cell anemia Paternal Aunt     Sickle cell anemia Paternal Aunt     No Known Problems Paternal Aunt     No Known Problems Paternal Aunt     No Known Problems Paternal Aunt     No Known Problems Paternal Aunt     No Known Problems Paternal Uncle     No Known Problems Paternal Uncle     Sickle cell anemia Paternal Uncle     Coronary artery disease Maternal Grandmother     Hyperlipidemia Maternal Grandmother     Thyroid disease Maternal Grandmother     Asthma Maternal Grandmother     Arthritis Maternal Grandmother     Vision loss Maternal Grandmother     Breast cancer Maternal Grandmother         age unknown    Diabetes Maternal Grandmother     Hyperlipidemia Maternal Grandfather     Thyroid disease Maternal Grandfather     Diabetes  "Maternal Grandfather     No Known Problems Paternal Grandmother     No Known Problems Paternal Grandfather         Review of Systems   HENT:  Negative for sore throat.    Respiratory:  Negative for cough and shortness of breath.    Cardiovascular:  Negative for chest pain and palpitations.   Gastrointestinal:  Positive for constipation (intermittent) and nausea (when constipated). Negative for abdominal pain, diarrhea and vomiting.        + GERD controlled with medication   Endocrine: Positive for heat intolerance. Negative for cold intolerance.   Genitourinary:  Negative for dysuria.   Musculoskeletal:  Positive for arthralgias (seeing rheumatology). Negative for back pain.   Skin:  Negative for rash.   Neurological:  Negative for headaches.   Psychiatric/Behavioral:  Negative for suicidal ideas (or HI).         + depression and anxiety       Objective:  /74   Pulse 81   Resp 16   Ht 5' 1\" (1.549 m)   Wt 102 kg (225 lb)   BMI 42.51 kg/m²     Physical Exam  Vitals and nursing note reviewed.        Constitutional   General appearance: Abnormal.  well developed and morbidly obese.   Eyes No conjunctival injection.   Ears, Nose, Mouth, and Throat Oral mucosa moist.   Pulmonary   Respiratory effort: No increased work of breathing or signs of respiratory distress.    Cardiovascular    Examination of extremities for edema and/or varicosities: Normal.  no edema.   Abdomen   Abdomen: Abnormal.  The abdomen was obese.    Musculoskeletal   Normal range of motion  Neurological   Gait and station: Normal.   Psychiatric   Orientation to person, place and time: Normal.    Affect: appropriate     "

## 2024-01-13 RX ORDER — ERGOCALCIFEROL 1.25 MG/1
50000 CAPSULE ORAL WEEKLY
Qty: 12 CAPSULE | Refills: 1 | Status: SHIPPED | OUTPATIENT
Start: 2024-01-13

## 2024-01-13 RX ORDER — SULFASALAZINE 500 MG/1
TABLET ORAL
Qty: 120 TABLET | Refills: 6 | Status: SHIPPED | OUTPATIENT
Start: 2024-01-13

## 2024-01-14 PROBLEM — E66.813 OBESITY, CLASS III, BMI 40-49.9 (MORBID OBESITY): Status: ACTIVE | Noted: 2020-07-23

## 2024-01-14 NOTE — ASSESSMENT & PLAN NOTE
- Discussed options of HealthyCORE-Intensive Lifestyle Intervention Program, Very Low Calorie Diet-VLCD, Conservative Program, Tiffany-En-Y Gastric Bypass, and Vertical Sleeve Gastrectomy and the role of weight loss medications.  - Not currently interested in weight loss surgery.  - Explained the importance of making lifestyle changes with anti-obesity medications.  - Patient is interested in pursuing Conservative Program  - Initial weight loss goal of 5-10% weight loss for improved health  - Weight loss can improve patient's co-morbid conditions and/or prevent weight-related complications.  - Interested in weight loss medication to help with appetite and cravings.   - FDA approved weight loss medications reviewed: Wegovy, Saxenda, Zepbound, Qsymia, Contrave, and Phentermine. Wegovy and Saxenda shortage discussed. Off label use of medications discussed.   - On phentermine in the past and caused chest pain and palpitations.   - Contrave in the past caused headaches.   - Patient denies personal history of pancreatitis. Patient also denies personal and family history of thyroid cancer and multiple endocrine neoplasia type 2 (MEN 2 tumor).   - Denies history of kidney stones, gallstones, seizures, or glaucoma.   - Advised to use contraception while on weight loss medication, as pregnancy not recommended while taking weight loss medication. Reviewed with her that as she loses weight, fertility can increase. If she wants to conceive in the future, advised she should be off of all weight loss medications for at least 3 months prior to trying to conceive.   She will check coverage of medication and update me. Leaning towards Zepbound and Saxenda.   - Medication contract signed.  - Stop Bang 2/8  - Labs reviewed: CMP and CBC 1/10/2024 were within acceptable range.   - Check TSH, lipid, A1C, and fasting insulin.       Goals:  Do not skip meals.  Food log (ie.) www.Eli[employer] Borges Urzed[employer].[email or web address],sparkpeople.com,loseit.com,DemandTec.com,etc.  baritastic (use skinnytaste.com, dietdoctor.com or smartphone aly Trunity for recipes)  No sugary beverages. At least 64oz of water daily.  Increase physical activity by 10 minutes daily. Gradually increase physical activity to a goal of 5 days per week for 30 minutes of MODERATE intensity PLUS 2 days per week of FULL BODY resistance training (use smartphone apps [employer], Home Workout, etc.)  Start food logging, weighing and measuring food.   1300 calories per day. Sample menu given.   Increase water intake to at least 64 oz daily.   Reduce and ideally eliminate sugar beverages.   Increase exercise as tolerated - currently in PT for ankle pain.  Increase sleep closer to 7-8 hours per night.

## 2024-01-14 NOTE — ASSESSMENT & PLAN NOTE
- Taking prilosec. May improve with weight loss and lifestyle modification. Continue management with prescribing provider.

## 2024-01-15 ENCOUNTER — TELEPHONE (OUTPATIENT)
Dept: BARIATRICS | Facility: CLINIC | Age: 39
End: 2024-01-15

## 2024-01-15 RX ORDER — TIRZEPATIDE 2.5 MG/.5ML
2.5 INJECTION, SOLUTION SUBCUTANEOUS WEEKLY
Qty: 2 ML | Refills: 0 | Status: SHIPPED | OUTPATIENT
Start: 2024-01-15 | End: 2024-01-16

## 2024-01-15 NOTE — TELEPHONE ENCOUNTER
Spoke to Pt let her know results , Pt asked for med  ZEPBOUND 25 mg , to sent to her Pharmacy  [address] [facility] thank you .

## 2024-01-15 NOTE — TELEPHONE ENCOUNTER
----- Message from NICOLAS Riggs sent at 1/14/2024  2:19 PM EST -----  Please let the patient know I reviewed her blood work. Her A1c is elevated consistent with prediabetes. She should avoid refined carbohydrates including white bread, rice, pasta, pretzels, chips, desserts and sugary beverages. This can improve with weight loss and cardiovascular exercise. HDL (good chol) was slightly low and will likely improve with weight loss and cardiovascular exercise. Remainder of the blood work was within acceptable range.

## 2024-02-01 DIAGNOSIS — J30.2 SEASONAL ALLERGIES: ICD-10-CM

## 2024-02-01 RX ORDER — FEXOFENADINE HCL AND PSEUDOEPHEDRINE HCI 180; 240 MG/1; MG/1
1 TABLET, EXTENDED RELEASE ORAL DAILY
Qty: 90 TABLET | Refills: 0 | OUTPATIENT
Start: 2024-02-01

## 2024-02-01 NOTE — TELEPHONE ENCOUNTER
Reason for call:   [x] Refill   [] Prior Auth  [] Other:     Office:   [x] PCP/Provider - Cb Barclay DO   [] Specialty/Provider -     Medication: Cb Barclay DO     Dose/Frequency: 180-240 mg 1 tablet daily    Quantity: 90    Pharmacy: [id number]    Does the patient have enough for 3 days?   [] Yes   [x] No - Send as HP to POD

## 2024-02-01 NOTE — TELEPHONE ENCOUNTER
E-Prescribing Status: Receipt confirmed by pharmacy (10/26/2023  3:17 PM EDT   90-day + 1 refill  Patient notified via Survela message

## 2024-02-08 ENCOUNTER — OFFICE VISIT (OUTPATIENT)
Dept: OBGYN CLINIC | Facility: MEDICAL CENTER | Age: 39
End: 2024-02-08
Payer: OTHER MISCELLANEOUS

## 2024-02-08 ENCOUNTER — TELEPHONE (OUTPATIENT)
Age: 39
End: 2024-02-08

## 2024-02-08 VITALS — HEIGHT: 61 IN | WEIGHT: 225 LBS | BODY MASS INDEX: 42.48 KG/M2

## 2024-02-08 DIAGNOSIS — S92.251D CLOSED AVULSION FRACTURE OF NAVICULAR BONE OF RIGHT FOOT WITH ROUTINE HEALING, SUBSEQUENT ENCOUNTER: Primary | ICD-10-CM

## 2024-02-08 DIAGNOSIS — M25.571 CHRONIC PAIN OF RIGHT ANKLE: ICD-10-CM

## 2024-02-08 DIAGNOSIS — G89.29 CHRONIC PAIN OF RIGHT ANKLE: ICD-10-CM

## 2024-02-08 DIAGNOSIS — S93.491D SPRAIN OF ANTERIOR TALOFIBULAR LIGAMENT OF RIGHT ANKLE, SUBSEQUENT ENCOUNTER: ICD-10-CM

## 2024-02-08 DIAGNOSIS — Q66.6 PES PLANOVALGUS: ICD-10-CM

## 2024-02-08 PROCEDURE — 99213 OFFICE O/P EST LOW 20 MIN: CPT | Performed by: EMERGENCY MEDICINE

## 2024-02-08 NOTE — LETTER
February 8, 2024     Patient: Renee Wall  YOB: 1985  Date of Visit: 2/8/2024      To Whom it May Concern:    Renee Wall is under my professional care. Renee was seen in my office on 2/8/2024. Work excuse until further notice.  Continue PT.  F/U 4 weeks.    If you have any questions or concerns, please don't hesitate to call.         Sincerely,          Wally Cash MD        CC: No Recipients

## 2024-02-08 NOTE — TELEPHONE ENCOUNTER
Caller: Divina Yao     Doctor/Office: Shreya MICHAEL#: [phone]    What needs to be faxed: Last office note     ATTN to: Divina    Fax#: [phone]      Documents were successfully e-faxed   2/8/2024

## 2024-02-08 NOTE — PROGRESS NOTES
"    Assessment/Plan:    Diagnoses and all orders for this visit:    Closed avulsion fracture of navicular bone of right foot with routine healing, subsequent encounter    Sprain of anterior talofibular ligament of right ankle, subsequent encounter    Chronic pain of right ankle    Pes planovalgus    S/p CSI by outside physician (office notes not available)    Wean from Airsport ankle brace to compression sleeve.  May also consider arch supports as discussed.  Continue aggressive PT (reviewed and signed PT note)  Out of work note provided      Return in about 4 weeks (around 3/7/2024).      Subjective:   Patient ID: Renee Wall is a 38 y.o. female.    WC 8/11/23    Patient returns with some improvement, having been seen by outside physician and received 2 lateral ankle CSIs in January with mild improvement, pain still stays 4-5/10.  She notes anterior ankle and dorsal midfoot pain radiating into lateral ankle.  She's completed about 24 sessions of PT.    She is using ankle brace and was discussed in PT to wean from brace      Previous note:  Patient returns feeling 50% baseline, she notes medial and anterior ankle pain especially with standing 15 minutes having to rest and sit.    s/p Ortho consult who recommended \"  · Her MRI does not show any surgical issue.  The avulsion fracture of her navicular was noted on Xray and also seen on MRI.  · Her right ankle is significantly weak in all planes compared to the contralateral ankle  · Continue PT/HEP as directed   · WBAT in supportive sneaker   · Compression sock for swelling control   She is scheduled to see other physicians possibly TIKI.      Previous note:  Patient returns to review MRI results from [facility].  She still has pain medial and anterior ankle.  She's utilizing ankle brace.      Previous note:  Patient returns with continued pain, she started PT and had 3 total visits. She notes medial, anterior and lateral pain.  Weaned from boot into ankle " brace.  Pain with WB, found she can only drive for 10 min due to increased pain.    PT at [facility]     Previous note:  Patient returns unable to transition out of CAM boot due to anterior lateral pain, she notes the lace up ankle brace pushing on her inner ankle and causing pain.    PT has not been set up yet.      Previous note:  Patient returns wearing CAM boot and using crutches.  She notes continued pain of the dorsal proximal foot and has tried to wean from CAM.    She notes that physical therapy was scheduled on 9/13 however this was canceled as it had to be reschedule through Worker's Comp.    Initial note:  38yo female presenting to clinic for evaluation of right ankle pain. Pt works for [employer] when she tripped over step and fell last Friday 8/11/2023. She notes that she inverted her ankle during fall. She also endorses hearing a crunching sound when she fell. Pain occurs daily and is aggravated with use. Pain is 9/10 but subsides to 6/10 with NSAIDs. She also puts on on foot which has helped with swelling.     Ankle Injury  This is a new problem. The current episode started 1 to 4 weeks ago. The problem occurs daily. The problem has been gradually improving. Associated symptoms include numbness (plantar aspect of big toe) and weakness. The symptoms are aggravated by standing and walking. She has tried NSAIDs, ice and relaxation for the symptoms. The treatment provided mild relief.          Review of Systems    The following portions of the patient's chart were reviewed and updated as appropriate:   Allergy:  No Known Allergies    Medications:    Current Outpatient Medications:     albuterol (2.5 mg/3 mL) 0.083 % nebulizer solution, Take 3 mL (2.5 mg total) by nebulization every 6 (six) hours as needed for wheezing or shortness of breath, Disp: 75 mL, Rfl: 3    albuterol (PROVENTIL HFA,VENTOLIN HFA) 90 mcg/act inhaler, Inhale 2 puffs every 4 (four) hours as needed (as needed), Disp: 18 g, Rfl: 2     budesonide-formoterol (SYMBICORT) 160-4.5 mcg/act inhaler, Inhale 2 puffs 2 (two) times a day Rinse mouth after use., Disp: 30.6 g, Rfl: 1    celecoxib (CeleBREX) 200 mg capsule, Take 1 capsule (200 mg total) by mouth 2 (two) times a day, Disp: 180 capsule, Rfl: 1    cyclobenzaprine (FLEXERIL) 5 mg tablet, Take 1 tablet (5 mg total) by mouth daily at bedtime, Disp: 30 tablet, Rfl: 6    Diclofenac Sodium (VOLTAREN) 1 %, Apply 2 g topically 4 (four) times a day, Disp: 100 g, Rfl: 6    ergocalciferol (VITAMIN D2) 50,000 units, Take 1 capsule (50,000 Units total) by mouth once a week, Disp: 12 capsule, Rfl: 1    fexofenadine-pseudoephedrine (Allegra-D Allergy & Congestion) 180-240 MG per 24 hr tablet, Take 1 tablet by mouth daily, Disp: 90 tablet, Rfl: 1    Insulin Pen Needle 32G X 4 MM MISC, Use in the morning, Disp: 100 each, Rfl: 1    liraglutide (SAXENDA) injection, Inject 0.6 mg subcutaneously once per day for the first week. Increase in weekly intervals by 0.6 mg until a dose of 3 mg is reached, Disp: 15 mL, Rfl: 3    omeprazole (PriLOSEC) 40 MG capsule, Take 1 capsule (40 mg total) by mouth daily, Disp: 90 capsule, Rfl: 1    sulfaSALAzine (AZULFIDINE) 500 mg tablet, Take 1 tab daily for a week, then 1 tab twice a day for a week, then 2 tabs twice a day, Disp: 120 tablet, Rfl: 6    Patient Active Problem List   Diagnosis    Asthma, mild persistent    BPPV (benign paroxysmal positional vertigo)    Mild single current episode of major depressive disorder (HCC)    Seasonal allergies    History of anxiety disorder    Primary insomnia    Chronic arthralgias of knees and hips    Myalgia    Chronic bilateral low back pain without sciatica    Gastroesophageal reflux disease without esophagitis    Obesity, Class III, BMI 40-49.9 (morbid obesity) (HCC)    Left breast mass    Abnormal finding on breast imaging    Family history of breast cancer    Family history of ovarian cancer    Bilateral hand pain    Right ankle sprain  "   Avulsion fracture of navicular bone of right foot    Sprain of anterior talofibular ligament of right ankle, subsequent encounter    Closed avulsion fracture of navicular bone of right foot with routine healing, subsequent encounter       Objective:  Ht 5' 1\" (1.549 m)   Wt 102 kg (225 lb)   BMI 42.51 kg/m²     Right Ankle Exam     Other   Erythema: present  Sensation: normal  Pulse: present     Comments:  Diffuse ttp ankle  + pes planus            Physical Exam      Neurologic Exam    Procedures    I have personally reviewed the written report of the pertinent studies.             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     Depression     GERD (gastroesophageal reflux disease)     Obesity     Scoliosis     Visual impairment        Past Surgical History:   Procedure Laterality Date    HERNIA REPAIR         Social History     Socioeconomic History    Marital status: Single     Spouse name: Not on file    Number of children: Not on file    Years of education: Not on file    Highest education level: Not on file   Occupational History    Occupation: unemployed   Tobacco Use    Smoking status: Former     Current packs/day: 0.00     Types: Cigarettes     Quit date:      Years since quittin.1    Smokeless tobacco: Never   Vaping Use    Vaping status: Never Used   Substance and Sexual Activity    Alcohol use: Yes     Comment: occasionally    Drug use: Never    Sexual activity: Yes     Partners: Male   Other Topics Concern    Not on file   Social History Narrative    Not on file     Social Determinants of Health     Financial Resource Strain: Low Risk  (2020)    Overall Financial Resource Strain (CARDIA)     Difficulty of Paying Living Expenses: Not hard at all   Food Insecurity: No Food Insecurity (2020)    Hunger Vital Sign     Worried About Running Out of Food in the Last Year: Never true     Ran Out of Food in the Last Year: " Never true   Transportation Needs: No Transportation Needs (11/18/2020)    PRAPARE - Transportation     Lack of Transportation (Medical): No     Lack of Transportation (Non-Medical): No   Physical Activity: Insufficiently Active (7/23/2020)    Exercise Vital Sign     Days of Exercise per Week: 3 days     Minutes of Exercise per Session: 30 min   Stress: No Stress Concern Present (7/23/2020)    [place] citizen [place] of Occupational Health - Occupational Stress Questionnaire     Feeling of Stress : Not at all   Social Connections: Moderately Isolated (7/23/2020)    Social Connection and Isolation Panel [NHANES]     Frequency of Communication with Friends and Family: More than three times a week     Frequency of Social Gatherings with Friends and Family: More than three times a week     Attends Taoist Services: 1 to 4 times per year     Active Member of Clubs or Organizations: No     Attends Club or Organization Meetings: Never     Marital Status: Never    Intimate Partner Violence: Not At Risk (7/23/2020)    Humiliation, Afraid, Rape, and Kick questionnaire     Fear of Current or Ex-Partner: No     Emotionally Abused: No     Physically Abused: No     Sexually Abused: No   Housing Stability: Not on file       Family History   Problem Relation Age of Onset    Heart disease Mother     Thyroid disease Mother     Arthritis Mother     Diabetes Mother     Heart attack Mother     Depression Father     Arthritis Father     No Known Problems Sister     No Known Problems Brother     No Known Problems Daughter     No Known Problems Son     Ovarian cancer Maternal Aunt         50s    Diabetes Maternal Aunt     Colon cancer Maternal Aunt     Diabetes Maternal Aunt     Diabetes Maternal Uncle     Diabetes Maternal Uncle     Sickle cell anemia Paternal Aunt     Sickle cell anemia Paternal Aunt     No Known Problems Paternal Aunt     No Known Problems Paternal Aunt     No Known Problems Paternal Aunt     No Known Problems  Paternal Aunt     No Known Problems Paternal Uncle     No Known Problems Paternal Uncle     Sickle cell anemia Paternal Uncle     Coronary artery disease Maternal Grandmother     Hyperlipidemia Maternal Grandmother     Thyroid disease Maternal Grandmother     Asthma Maternal Grandmother     Arthritis Maternal Grandmother     Vision loss Maternal Grandmother     Breast cancer Maternal Grandmother         age unknown    Diabetes Maternal Grandmother     Hyperlipidemia Maternal Grandfather     Thyroid disease Maternal Grandfather     Diabetes Maternal Grandfather     No Known Problems Paternal Grandmother     No Known Problems Paternal Grandfather

## 2024-02-08 NOTE — PATIENT INSTRUCTIONS
Recommend Spenco arch supports (I recommend the green and black RX Orthotic Arch 3/4 Length) on Amazon or their website [email or web address].  Wean into wearing, as your feet will need to get used to them.

## 2024-03-07 ENCOUNTER — TELEPHONE (OUTPATIENT)
Age: 39
End: 2024-03-07

## 2024-03-07 ENCOUNTER — OFFICE VISIT (OUTPATIENT)
Dept: OBGYN CLINIC | Facility: MEDICAL CENTER | Age: 39
End: 2024-03-07
Payer: OTHER MISCELLANEOUS

## 2024-03-07 VITALS — WEIGHT: 218 LBS | BODY MASS INDEX: 41.16 KG/M2 | HEIGHT: 61 IN

## 2024-03-07 DIAGNOSIS — S92.251D CLOSED AVULSION FRACTURE OF NAVICULAR BONE OF RIGHT FOOT WITH ROUTINE HEALING, SUBSEQUENT ENCOUNTER: Primary | ICD-10-CM

## 2024-03-07 DIAGNOSIS — Q66.6 PES PLANOVALGUS: ICD-10-CM

## 2024-03-07 DIAGNOSIS — G89.29 CHRONIC PAIN OF RIGHT ANKLE: ICD-10-CM

## 2024-03-07 DIAGNOSIS — M25.571 CHRONIC PAIN OF RIGHT ANKLE: ICD-10-CM

## 2024-03-07 DIAGNOSIS — S93.491D SPRAIN OF ANTERIOR TALOFIBULAR LIGAMENT OF RIGHT ANKLE, SUBSEQUENT ENCOUNTER: ICD-10-CM

## 2024-03-07 PROCEDURE — 99213 OFFICE O/P EST LOW 20 MIN: CPT | Performed by: EMERGENCY MEDICINE

## 2024-03-07 NOTE — LETTER
March 7, 2024     Patient: Renee Wall  YOB: 1985  Date of Visit: 3/7/2024      To Whom it May Concern:    Renee Wall is under my professional care. Renee was seen in my office on 3/7/2024. Work excuse until further notice.  Continue PT.  F/U 5 weeks.    If you have any questions or concerns, please don't hesitate to call.         Sincerely,          Wally Cash MD        CC: No Recipients

## 2024-03-07 NOTE — TELEPHONE ENCOUNTER
Caller: [email or web address]@    Doctor/Office: Shreya MICHAEL#:       What needs to be faxed: THOMAS 3/7/24    ATTN to:     Fax#: [phone]      Documents were successfully e-faxed

## 2024-03-07 NOTE — PROGRESS NOTES
"    Assessment/Plan:    Diagnoses and all orders for this visit:    Closed avulsion fracture of navicular bone of right foot with routine healing, subsequent encounter  -     Ambulatory Referral to Physical Therapy; Future    Sprain of anterior talofibular ligament of right ankle, subsequent encounter  -     Ambulatory Referral to Physical Therapy; Future    Chronic pain of right ankle  -     Ambulatory Referral to Physical Therapy; Future    Pes planovalgus  -     Ambulatory Referral to Physical Therapy; Future      Scheduled for TIKI  F/U with outside DPM (she is s/p CSIs)  Continue PT  Pain increased from last appt, out of work note    Return in about 5 weeks (around 4/11/2024).      Subjective:   Patient ID: Renee Wall is a 38 y.o. female.    WC 8/11/23    Renee returns experiencing throbbing at night time also worse with bad weather, benefiting from IBU or Voltaren.  Pain also worse with prolonged standing which can be medial and anterior.  Experiencing swelling, using compression socks.  Pain has been 6-7/10.    Scheduled for TIKI 3/20.  She sees her outside surgical DPM doctor tomorrow     Previous note:  Patient returns with some improvement, having been seen by outside physician and received 2 lateral ankle CSIs in January with mild improvement, pain still stays 4-5/10.  She notes anterior ankle and dorsal midfoot pain radiating into lateral ankle.  She's completed about 24 sessions of PT.    She is using ankle brace and was discussed in PT to wean from brace    Previous note:  Patient returns feeling 50% baseline, she notes medial and anterior ankle pain especially with standing 15 minutes having to rest and sit.    s/p Ortho consult who recommended \"  · Her MRI does not show any surgical issue.  The avulsion fracture of her navicular was noted on Xray and also seen on MRI.  · Her right ankle is significantly weak in all planes compared to the contralateral ankle  · Continue PT/HEP as directed   · WBAT " in supportive sneaker   · Compression sock for swelling control   She is scheduled to see other physicians possibly TIKI.      Previous note:  Patient returns to review MRI results from [facility].  She still has pain medial and anterior ankle.  She's utilizing ankle brace.      Previous note:  Patient returns with continued pain, she started PT and had 3 total visits. She notes medial, anterior and lateral pain.  Weaned from boot into ankle brace.  Pain with WB, found she can only drive for 10 min due to increased pain.    PT at [facility]     Previous note:  Patient returns unable to transition out of CAM boot due to anterior lateral pain, she notes the lace up ankle brace pushing on her inner ankle and causing pain.    PT has not been set up yet.      Previous note:  Patient returns wearing CAM boot and using crutches.  She notes continued pain of the dorsal proximal foot and has tried to wean from CAM.    She notes that physical therapy was scheduled on 9/13 however this was canceled as it had to be reschedule through Worker's Comp.    Initial note:  36yo female presenting to clinic for evaluation of right ankle pain. Pt works for [employer] when she tripped over step and fell last Friday 8/11/2023. She notes that she inverted her ankle during fall. She also endorses hearing a crunching sound when she fell. Pain occurs daily and is aggravated with use. Pain is 9/10 but subsides to 6/10 with NSAIDs. She also puts on on foot which has helped with swelling.     Ankle Injury  This is a new problem. The current episode started 1 to 4 weeks ago. The problem occurs daily. The problem has been gradually improving. Associated symptoms include numbness (plantar aspect of big toe) and weakness. The symptoms are aggravated by standing and walking. She has tried NSAIDs, ice and relaxation for the symptoms. The treatment provided mild relief.            Review of Systems    The following portions of the  patient's chart were reviewed and updated as appropriate:   Allergy:  No Known Allergies    Medications:    Current Outpatient Medications:     albuterol (2.5 mg/3 mL) 0.083 % nebulizer solution, Take 3 mL (2.5 mg total) by nebulization every 6 (six) hours as needed for wheezing or shortness of breath, Disp: 75 mL, Rfl: 3    albuterol (PROVENTIL HFA,VENTOLIN HFA) 90 mcg/act inhaler, Inhale 2 puffs every 4 (four) hours as needed (as needed), Disp: 18 g, Rfl: 2    budesonide-formoterol (SYMBICORT) 160-4.5 mcg/act inhaler, Inhale 2 puffs 2 (two) times a day Rinse mouth after use., Disp: 30.6 g, Rfl: 1    celecoxib (CeleBREX) 200 mg capsule, Take 1 capsule (200 mg total) by mouth 2 (two) times a day, Disp: 180 capsule, Rfl: 1    cyclobenzaprine (FLEXERIL) 5 mg tablet, Take 1 tablet (5 mg total) by mouth daily at bedtime, Disp: 30 tablet, Rfl: 6    Diclofenac Sodium (VOLTAREN) 1 %, Apply 2 g topically 4 (four) times a day, Disp: 100 g, Rfl: 6    ergocalciferol (VITAMIN D2) 50,000 units, Take 1 capsule (50,000 Units total) by mouth once a week, Disp: 12 capsule, Rfl: 1    fexofenadine-pseudoephedrine (Allegra-D Allergy & Congestion) 180-240 MG per 24 hr tablet, Take 1 tablet by mouth daily, Disp: 90 tablet, Rfl: 1    Insulin Pen Needle 32G X 4 MM MISC, Use in the morning, Disp: 100 each, Rfl: 1    liraglutide (SAXENDA) injection, Inject 0.6 mg subcutaneously once per day for the first week. Increase in weekly intervals by 0.6 mg until a dose of 3 mg is reached, Disp: 15 mL, Rfl: 3    omeprazole (PriLOSEC) 40 MG capsule, Take 1 capsule (40 mg total) by mouth daily, Disp: 90 capsule, Rfl: 1    sulfaSALAzine (AZULFIDINE) 500 mg tablet, Take 1 tab daily for a week, then 1 tab twice a day for a week, then 2 tabs twice a day, Disp: 120 tablet, Rfl: 6    Patient Active Problem List   Diagnosis    Asthma, mild persistent    BPPV (benign paroxysmal positional vertigo)    Mild single current episode of major depressive disorder  "([facility])    Seasonal allergies    History of anxiety disorder    Primary insomnia    Chronic arthralgias of knees and hips    Myalgia    Chronic bilateral low back pain without sciatica    Gastroesophageal reflux disease without esophagitis    Obesity, Class III, BMI 40-49.9 (morbid obesity) ([facility])    Left breast mass    Abnormal finding on breast imaging    Family history of breast cancer    Family history of ovarian cancer    Bilateral hand pain    Right ankle sprain    Avulsion fracture of navicular bone of right foot    Sprain of anterior talofibular ligament of right ankle, subsequent encounter    Closed avulsion fracture of navicular bone of right foot with routine healing, subsequent encounter       Objective:  Ht 5' 1\" (1.549 m)   Wt 98.9 kg (218 lb)   BMI 41.19 kg/m²     Ortho Exam    Physical Exam      Neurologic Exam    Procedures    I have personally reviewed the written report of the pertinent studies.             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     Depression     GERD (gastroesophageal reflux disease)     Obesity     Scoliosis     Visual impairment        Past Surgical History:   Procedure Laterality Date    HERNIA REPAIR         Social History     Socioeconomic History    Marital status: Single     Spouse name: Not on file    Number of children: Not on file    Years of education: Not on file    Highest education level: Not on file   Occupational History    Occupation: unemployed   Tobacco Use    Smoking status: Former     Current packs/day: 0.00     Types: Cigarettes     Quit date:      Years since quittin.1    Smokeless tobacco: Never   Vaping Use    Vaping status: Never Used   Substance and Sexual Activity    Alcohol use: Yes     Comment: occasionally    Drug use: Never    Sexual activity: Yes     Partners: Male   Other Topics Concern    Not on file   Social History Narrative    Not on file     Social Determinants " of Health     Financial Resource Strain: Low Risk  (11/18/2020)    Overall Financial Resource Strain (CARDIA)     Difficulty of Paying Living Expenses: Not hard at all   Food Insecurity: No Food Insecurity (11/18/2020)    Hunger Vital Sign     Worried About Running Out of Food in the Last Year: Never true     Ran Out of Food in the Last Year: Never true   Transportation Needs: No Transportation Needs (11/18/2020)    PRAPARE - Transportation     Lack of Transportation (Medical): No     Lack of Transportation (Non-Medical): No   Physical Activity: Insufficiently Active (7/23/2020)    Exercise Vital Sign     Days of Exercise per Week: 3 days     Minutes of Exercise per Session: 30 min   Stress: No Stress Concern Present (7/23/2020)    [employer] [place] [employer] - Occupational Stress Questionnaire     Feeling of Stress : Not at all   Social Connections: Moderately Isolated (7/23/2020)    Social Connection and Isolation Panel [NHANES]     Frequency of Communication with Friends and Family: More than three times a week     Frequency of Social Gatherings with Friends and Family: More than three times a week     Attends Anabaptism Services: 1 to 4 times per year     Active Member of Clubs or Organizations: No     Attends Club or Organization Meetings: Never     Marital Status: Never    Intimate Partner Violence: Not At Risk (7/23/2020)    Humiliation, Afraid, Rape, and Kick questionnaire     Fear of Current or Ex-Partner: No     Emotionally Abused: No     Physically Abused: No     Sexually Abused: No   Housing Stability: Not on file       Family History   Problem Relation Age of Onset    Heart disease Mother     Thyroid disease Mother     Arthritis Mother     Diabetes Mother     Heart attack Mother     Depression Father     Arthritis Father     No Known Problems Sister     No Known Problems Brother     No Known Problems Daughter     No Known Problems Son     Ovarian cancer Maternal Aunt         50s     Diabetes Maternal Aunt     Colon cancer Maternal Aunt     Diabetes Maternal Aunt     Diabetes Maternal Uncle     Diabetes Maternal Uncle     Sickle cell anemia Paternal Aunt     Sickle cell anemia Paternal Aunt     No Known Problems Paternal Aunt     No Known Problems Paternal Aunt     No Known Problems Paternal Aunt     No Known Problems Paternal Aunt     No Known Problems Paternal Uncle     No Known Problems Paternal Uncle     Sickle cell anemia Paternal Uncle     Coronary artery disease Maternal Grandmother     Hyperlipidemia Maternal Grandmother     Thyroid disease Maternal Grandmother     Asthma Maternal Grandmother     Arthritis Maternal Grandmother     Vision loss Maternal Grandmother     Breast cancer Maternal Grandmother         age unknown    Diabetes Maternal Grandmother     Hyperlipidemia Maternal Grandfather     Thyroid disease Maternal Grandfather     Diabetes Maternal Grandfather     No Known Problems Paternal Grandmother     No Known Problems Paternal Grandfather

## 2024-03-08 ENCOUNTER — APPOINTMENT (EMERGENCY)
Dept: CT IMAGING | Facility: HOSPITAL | Age: 39
End: 2024-03-08
Payer: COMMERCIAL

## 2024-03-08 ENCOUNTER — HOSPITAL ENCOUNTER (EMERGENCY)
Facility: HOSPITAL | Age: 39
Discharge: HOME | End: 2024-03-08
Attending: EMERGENCY MEDICINE
Payer: COMMERCIAL

## 2024-03-08 VITALS
OXYGEN SATURATION: 96 % | HEART RATE: 67 BPM | DIASTOLIC BLOOD PRESSURE: 67 MMHG | SYSTOLIC BLOOD PRESSURE: 113 MMHG | RESPIRATION RATE: 16 BRPM | TEMPERATURE: 98 F

## 2024-03-08 DIAGNOSIS — R10.9 ABDOMINAL PAIN: ICD-10-CM

## 2024-03-08 DIAGNOSIS — R19.7 DIARRHEA: Primary | ICD-10-CM

## 2024-03-08 LAB
ALBUMIN SERPL BCP-MCNC: 4.1 G/DL (ref 3.5–5)
ALP SERPL-CCNC: 77 U/L (ref 34–104)
ALT SERPL W P-5'-P-CCNC: 11 U/L (ref 7–52)
ANION GAP SERPL CALCULATED.3IONS-SCNC: 7 MMOL/L
AST SERPL W P-5'-P-CCNC: 12 U/L (ref 13–39)
ATRIAL RATE: 82 BPM
BACTERIA UR QL AUTO: ABNORMAL /HPF
BASOPHILS # BLD AUTO: 0.03 THOUSANDS/ÂΜL (ref 0–0.1)
BASOPHILS NFR BLD AUTO: 0 % (ref 0–1)
BILIRUB SERPL-MCNC: 0.27 MG/DL (ref 0.2–1)
BILIRUB UR QL STRIP: NEGATIVE
BUN SERPL-MCNC: 10 MG/DL (ref 5–25)
CALCIUM SERPL-MCNC: 8.5 MG/DL (ref 8.4–10.2)
CARDIAC TROPONIN I PNL SERPL HS: <2 NG/L
CHLORIDE SERPL-SCNC: 106 MMOL/L (ref 96–108)
CLARITY UR: ABNORMAL
CO2 SERPL-SCNC: 24 MMOL/L (ref 21–32)
COLOR UR: YELLOW
CREAT SERPL-MCNC: 0.69 MG/DL (ref 0.6–1.3)
EOSINOPHIL # BLD AUTO: 0.16 THOUSAND/ÂΜL (ref 0–0.61)
EOSINOPHIL NFR BLD AUTO: 2 % (ref 0–6)
ERYTHROCYTE [DISTWIDTH] IN BLOOD BY AUTOMATED COUNT: 14.4 % (ref 11.6–15.1)
EXT PREGNANCY TEST URINE: NEGATIVE
EXT. CONTROL: NORMAL
FLUAV RNA RESP QL NAA+PROBE: NEGATIVE
FLUBV RNA RESP QL NAA+PROBE: NEGATIVE
GFR SERPL CREATININE-BSD FRML MDRD: 111 ML/MIN/1.73SQ M
GLUCOSE SERPL-MCNC: 95 MG/DL (ref 65–140)
GLUCOSE UR STRIP-MCNC: NEGATIVE MG/DL
HCT VFR BLD AUTO: 39 % (ref 34.8–46.1)
HGB BLD-MCNC: 12.6 G/DL (ref 11.5–15.4)
HGB UR QL STRIP.AUTO: ABNORMAL
IMM GRANULOCYTES # BLD AUTO: 0.02 THOUSAND/UL (ref 0–0.2)
IMM GRANULOCYTES NFR BLD AUTO: 0 % (ref 0–2)
KETONES UR STRIP-MCNC: ABNORMAL MG/DL
LEUKOCYTE ESTERASE UR QL STRIP: NEGATIVE
LIPASE SERPL-CCNC: 17 U/L (ref 11–82)
LYMPHOCYTES # BLD AUTO: 2.18 THOUSANDS/ÂΜL (ref 0.6–4.47)
LYMPHOCYTES NFR BLD AUTO: 31 % (ref 14–44)
MCH RBC QN AUTO: 28.2 PG (ref 26.8–34.3)
MCHC RBC AUTO-ENTMCNC: 32.3 G/DL (ref 31.4–37.4)
MCV RBC AUTO: 87 FL (ref 82–98)
MONOCYTES # BLD AUTO: 0.82 THOUSAND/ÂΜL (ref 0.17–1.22)
MONOCYTES NFR BLD AUTO: 12 % (ref 4–12)
MUCOUS THREADS UR QL AUTO: ABNORMAL
NEUTROPHILS # BLD AUTO: 3.78 THOUSANDS/ÂΜL (ref 1.85–7.62)
NEUTS SEG NFR BLD AUTO: 55 % (ref 43–75)
NITRITE UR QL STRIP: NEGATIVE
NON-SQ EPI CELLS URNS QL MICRO: ABNORMAL /HPF
NRBC BLD AUTO-RTO: 0 /100 WBCS
P AXIS: 51 DEGREES
PH UR STRIP.AUTO: 5.5 [PH]
PLATELET # BLD AUTO: 320 THOUSANDS/UL (ref 149–390)
PMV BLD AUTO: 9.2 FL (ref 8.9–12.7)
POTASSIUM SERPL-SCNC: 3.4 MMOL/L (ref 3.5–5.3)
PR INTERVAL: 148 MS
PROT SERPL-MCNC: 7.1 G/DL (ref 6.4–8.4)
PROT UR STRIP-MCNC: ABNORMAL MG/DL
QRS AXIS: 9 DEGREES
QRSD INTERVAL: 84 MS
QT INTERVAL: 394 MS
QTC INTERVAL: 460 MS
RBC # BLD AUTO: 4.47 MILLION/UL (ref 3.81–5.12)
RBC #/AREA URNS AUTO: ABNORMAL /HPF
RSV RNA RESP QL NAA+PROBE: NEGATIVE
SARS-COV-2 RNA RESP QL NAA+PROBE: NEGATIVE
SODIUM SERPL-SCNC: 137 MMOL/L (ref 135–147)
SP GR UR STRIP.AUTO: 1.03 (ref 1–1.03)
T WAVE AXIS: 14 DEGREES
TSH SERPL DL<=0.05 MIU/L-ACNC: 2.82 UIU/ML (ref 0.45–4.5)
UROBILINOGEN UR STRIP-ACNC: <2 MG/DL
VENTRICULAR RATE: 82 BPM
WBC # BLD AUTO: 6.99 THOUSAND/UL (ref 4.31–10.16)
WBC #/AREA URNS AUTO: ABNORMAL /HPF

## 2024-03-08 PROCEDURE — 93005 ELECTROCARDIOGRAM TRACING: CPT

## 2024-03-08 PROCEDURE — 96361 HYDRATE IV INFUSION ADD-ON: CPT

## 2024-03-08 PROCEDURE — 80053 COMPREHEN METABOLIC PANEL: CPT

## 2024-03-08 PROCEDURE — 85025 COMPLETE CBC W/AUTO DIFF WBC: CPT

## 2024-03-08 PROCEDURE — 99285 EMERGENCY DEPT VISIT HI MDM: CPT

## 2024-03-08 PROCEDURE — 93010 ELECTROCARDIOGRAM REPORT: CPT | Performed by: INTERNAL MEDICINE

## 2024-03-08 PROCEDURE — 84443 ASSAY THYROID STIM HORMONE: CPT

## 2024-03-08 PROCEDURE — 36415 COLL VENOUS BLD VENIPUNCTURE: CPT

## 2024-03-08 PROCEDURE — 83690 ASSAY OF LIPASE: CPT

## 2024-03-08 PROCEDURE — 0241U HB NFCT DS VIR RESP RNA 4 TRGT: CPT

## 2024-03-08 PROCEDURE — 96375 TX/PRO/DX INJ NEW DRUG ADDON: CPT

## 2024-03-08 PROCEDURE — 84484 ASSAY OF TROPONIN QUANT: CPT

## 2024-03-08 PROCEDURE — 81001 URINALYSIS AUTO W/SCOPE: CPT

## 2024-03-08 PROCEDURE — 96374 THER/PROPH/DIAG INJ IV PUSH: CPT

## 2024-03-08 PROCEDURE — 74177 CT ABD & PELVIS W/CONTRAST: CPT

## 2024-03-08 PROCEDURE — 81025 URINE PREGNANCY TEST: CPT

## 2024-03-08 RX ORDER — FAMOTIDINE 10 MG/ML
20 INJECTION, SOLUTION INTRAVENOUS ONCE
Status: COMPLETED | OUTPATIENT
Start: 2024-03-08 | End: 2024-03-08

## 2024-03-08 RX ORDER — KETOROLAC TROMETHAMINE 30 MG/ML
15 INJECTION, SOLUTION INTRAMUSCULAR; INTRAVENOUS ONCE
Status: COMPLETED | OUTPATIENT
Start: 2024-03-08 | End: 2024-03-08

## 2024-03-08 RX ORDER — DICYCLOMINE HCL 20 MG
20 TABLET ORAL 2 TIMES DAILY
Qty: 20 TABLET | Refills: 0 | Status: SHIPPED | OUTPATIENT
Start: 2024-03-08

## 2024-03-08 RX ORDER — ACETAMINOPHEN 325 MG/1
975 TABLET ORAL ONCE
Status: COMPLETED | OUTPATIENT
Start: 2024-03-08 | End: 2024-03-08

## 2024-03-08 RX ORDER — FAMOTIDINE 20 MG/1
20 TABLET, FILM COATED ORAL 2 TIMES DAILY
Qty: 30 TABLET | Refills: 0 | Status: SHIPPED | OUTPATIENT
Start: 2024-03-08

## 2024-03-08 RX ORDER — ONDANSETRON 2 MG/ML
4 INJECTION INTRAMUSCULAR; INTRAVENOUS ONCE
Status: COMPLETED | OUTPATIENT
Start: 2024-03-08 | End: 2024-03-08

## 2024-03-08 RX ORDER — ONDANSETRON 4 MG/1
4 TABLET, ORALLY DISINTEGRATING ORAL EVERY 6 HOURS PRN
Qty: 12 TABLET | Refills: 0 | Status: SHIPPED | OUTPATIENT
Start: 2024-03-08

## 2024-03-08 RX ADMIN — IOHEXOL 100 ML: 350 INJECTION, SOLUTION INTRAVENOUS at 06:33

## 2024-03-08 RX ADMIN — ACETAMINOPHEN 325MG 975 MG: 325 TABLET ORAL at 05:51

## 2024-03-08 RX ADMIN — ONDANSETRON 4 MG: 2 INJECTION INTRAMUSCULAR; INTRAVENOUS at 05:42

## 2024-03-08 RX ADMIN — KETOROLAC TROMETHAMINE 15 MG: 30 INJECTION, SOLUTION INTRAMUSCULAR; INTRAVENOUS at 06:34

## 2024-03-08 RX ADMIN — SODIUM CHLORIDE 1000 ML: 0.9 INJECTION, SOLUTION INTRAVENOUS at 05:39

## 2024-03-08 RX ADMIN — FAMOTIDINE 20 MG: 10 INJECTION INTRAVENOUS at 05:41

## 2024-03-08 NOTE — Clinical Note
Juan accompanied Renee Wall to the emergency department on 3/8/2024.    Return date if applicable: 03/09/2024        If you have any questions or concerns, please don't hesitate to call.      Jacki Peace PA-C

## 2024-03-08 NOTE — Clinical Note
Renee Wall was seen and treated in our emergency department on 3/8/2024.                Diagnosis:     Renee  may return to work on return date.    She may return on this date: 03/09/2024         If you have any questions or concerns, please don't hesitate to call.      Jacki Paece PA-C    ______________________________           _______________          _______________  Hospital Representative                              Date                                Time

## 2024-03-08 NOTE — ED PROVIDER NOTES
History  Chief Complaint   Patient presents with    Abdominal Pain     Pt reports abd pain since last week +diarrhea.      The patient is a 38 YOF who presents to the ED for evaluation of epigastric abdominal pain, chest pain, and diarrhea which has been present x 5 days. She reports it feels as though her chest pain is separate from her abdominal pain. Describes this as a sharp, stabbing pain. She reports her epigastric pain is grinding in nature, worsens with eating/drinking anything. She also notes multiple episodes of clear, watery diarrhea daily which is typically preceded by worsening of her abdominal pain. She also reports 1 day history of headache, which is similar in nature to her migraines, but less severe. She otherwise denies melena, hematochezia, fever, chills, vomiting dysuria, hematuria, vaginal bleeding, vaginal discharge, recent travel, recent abx, sick contacts, medication changes. Has not taken anything for symptoms.         Prior to Admission Medications   Prescriptions Last Dose Informant Patient Reported? Taking?   Diclofenac Sodium (VOLTAREN) 1 %   No Yes   Sig: Apply 2 g topically 4 (four) times a day   Insulin Pen Needle 32G X 4 MM MISC Not Taking  No No   Sig: Use in the morning   Patient not taking: Reported on 3/8/2024   albuterol (2.5 mg/3 mL) 0.083 % nebulizer solution  Self No Yes   Sig: Take 3 mL (2.5 mg total) by nebulization every 6 (six) hours as needed for wheezing or shortness of breath   albuterol (PROVENTIL HFA,VENTOLIN HFA) 90 mcg/act inhaler  Self No Yes   Sig: Inhale 2 puffs every 4 (four) hours as needed (as needed)   budesonide-formoterol (SYMBICORT) 160-4.5 mcg/act inhaler  Self No Yes   Sig: Inhale 2 puffs 2 (two) times a day Rinse mouth after use.   celecoxib (CeleBREX) 200 mg capsule   No Yes   Sig: Take 1 capsule (200 mg total) by mouth 2 (two) times a day   cyclobenzaprine (FLEXERIL) 5 mg tablet   No Yes   Sig: Take 1 tablet (5 mg total) by mouth daily at bedtime    ergocalciferol (VITAMIN D2) 50,000 units   No Yes   Sig: Take 1 capsule (50,000 Units total) by mouth once a week   fexofenadine-pseudoephedrine (Allegra-D Allergy & Congestion) 180-240 MG per 24 hr tablet  Self No Yes   Sig: Take 1 tablet by mouth daily   liraglutide (SAXENDA) injection Not Taking  No No   Sig: Inject 0.6 mg subcutaneously once per day for the first week. Increase in weekly intervals by 0.6 mg until a dose of 3 mg is reached   Patient not taking: Reported on 3/8/2024   omeprazole (PriLOSEC) 40 MG capsule  Self No Yes   Sig: Take 1 capsule (40 mg total) by mouth daily   sulfaSALAzine (AZULFIDINE) 500 mg tablet Not Taking  No No   Sig: Take 1 tab daily for a week, then 1 tab twice a day for a week, then 2 tabs twice a day   Patient not taking: Reported on 3/8/2024      Facility-Administered Medications: None       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     Depression     GERD (gastroesophageal reflux disease)     Obesity     Scoliosis     Visual impairment        Past Surgical History:   Procedure Laterality Date    HERNIA REPAIR         Family History   Problem Relation Age of Onset    Heart disease Mother     Thyroid disease Mother     Arthritis Mother     Diabetes Mother     Heart attack Mother     Depression Father     Arthritis Father     No Known Problems Sister     No Known Problems Brother     No Known Problems Daughter     No Known Problems Son     Ovarian cancer Maternal Aunt         50s    Diabetes Maternal Aunt     Colon cancer Maternal Aunt     Diabetes Maternal Aunt     Diabetes Maternal Uncle     Diabetes Maternal Uncle     Sickle cell anemia Paternal Aunt     Sickle cell anemia Paternal Aunt     No Known Problems Paternal Aunt     No Known Problems Paternal Aunt     No Known Problems Paternal Aunt     No Known Problems Paternal Aunt     No Known Problems Paternal Uncle     No Known Problems Paternal Uncle      Sickle cell anemia Paternal Uncle     Coronary artery disease Maternal Grandmother     Hyperlipidemia Maternal Grandmother     Thyroid disease Maternal Grandmother     Asthma Maternal Grandmother     Arthritis Maternal Grandmother     Vision loss Maternal Grandmother     Breast cancer Maternal Grandmother         age unknown    Diabetes Maternal Grandmother     Hyperlipidemia Maternal Grandfather     Thyroid disease Maternal Grandfather     Diabetes Maternal Grandfather     No Known Problems Paternal Grandmother     No Known Problems Paternal Grandfather      I have reviewed and agree with the history as documented.    E-Cigarette/Vaping    E-Cigarette Use Never User      E-Cigarette/Vaping Substances    Nicotine No     THC No     CBD No     Flavoring No     Other No     Unknown No      Social History     Tobacco Use    Smoking status: Former     Current packs/day: 0.00     Types: Cigarettes     Quit date:      Years since quittin.1    Smokeless tobacco: Never   Vaping Use    Vaping status: Never Used   Substance Use Topics    Alcohol use: Yes     Comment: occasionally    Drug use: Never       Review of Systems   Constitutional:  Negative for chills and fever.   HENT:  Negative for congestion and rhinorrhea.    Respiratory:  Negative for cough and shortness of breath.    Cardiovascular:  Positive for chest pain. Negative for leg swelling.   Gastrointestinal:  Positive for abdominal pain, diarrhea and nausea. Negative for constipation and vomiting.   Genitourinary:  Negative for dysuria, flank pain and hematuria.   Musculoskeletal:  Negative for arthralgias and myalgias.   Skin:  Negative for rash and wound.   Neurological:  Positive for headaches. Negative for dizziness, weakness and numbness.       Physical Exam  Physical Exam  Vitals and nursing note reviewed.   Constitutional:       General: She is not in acute distress.     Appearance: She is well-developed. She is not toxic-appearing.   HENT:       Head: Normocephalic and atraumatic.   Eyes:      Conjunctiva/sclera: Conjunctivae normal.   Cardiovascular:      Rate and Rhythm: Normal rate and regular rhythm.      Heart sounds: No murmur heard.  Pulmonary:      Effort: Pulmonary effort is normal. No respiratory distress.      Breath sounds: Normal breath sounds.   Abdominal:      Palpations: Abdomen is soft.      Tenderness: There is abdominal tenderness in the right upper quadrant and epigastric area. There is guarding. There is no rebound.   Musculoskeletal:         General: No swelling.      Cervical back: Neck supple.   Skin:     General: Skin is warm and dry.      Capillary Refill: Capillary refill takes less than 2 seconds.   Neurological:      Mental Status: She is alert.   Psychiatric:         Mood and Affect: Mood normal.         Vital Signs  ED Triage Vitals [03/08/24 0448]   Temperature Pulse Respirations Blood Pressure SpO2   98 °F (36.7 °C) 86 18 128/75 96 %      Temp Source Heart Rate Source Patient Position - Orthostatic VS BP Location FiO2 (%)   Oral Monitor Sitting Right arm --      Pain Score       7           Vitals:    03/08/24 0448 03/08/24 0545 03/08/24 0600   BP: 128/75 116/79 119/71   Pulse: 86 73 72   Patient Position - Orthostatic VS: Sitting Sitting Lying         Visual Acuity      ED Medications  Medications   sodium chloride 0.9 % bolus 1,000 mL (1,000 mL Intravenous New Bag 3/8/24 0539)   ketorolac (TORADOL) injection 15 mg (has no administration in time range)   ondansetron (ZOFRAN) injection 4 mg (4 mg Intravenous Given 3/8/24 0542)   Famotidine (PF) (PEPCID) injection 20 mg (20 mg Intravenous Given 3/8/24 0541)   acetaminophen (TYLENOL) tablet 975 mg (975 mg Oral Given 3/8/24 0551)       Diagnostic Studies  Results Reviewed       Procedure Component Value Units Date/Time    HS Troponin 0hr (reflex protocol) [[id number]]  (Normal) Collected: 03/08/24 0538    Lab Status: Final result Specimen: Blood from Arm, Left Updated: 03/08/24  0616     hs TnI 0hr <2 ng/L     HS Troponin I 2hr [[id number]]     Lab Status: No result Specimen: Blood     Comprehensive metabolic panel [[id number]]  (Abnormal) Collected: 03/08/24 0538    Lab Status: Final result Specimen: Blood from Arm, Left Updated: 03/08/24 0610     Sodium 137 mmol/L      Potassium 3.4 mmol/L      Chloride 106 mmol/L      CO2 24 mmol/L      ANION GAP 7 mmol/L      BUN 10 mg/dL      Creatinine 0.69 mg/dL      Glucose 95 mg/dL      Calcium 8.5 mg/dL      AST 12 U/L      ALT 11 U/L      Alkaline Phosphatase 77 U/L      Total Protein 7.1 g/dL      Albumin 4.1 g/dL      Total Bilirubin 0.27 mg/dL      eGFR 111 ml/min/1.73sq m     Narrative:      [employer] guidelines for Chronic Kidney Disease (CKD):     Stage 1 with normal or high GFR (GFR > 90 mL/min/1.73 square meters)    Stage 2 Mild CKD (GFR = 60-89 mL/min/1.73 square meters)    Stage 3A Moderate CKD (GFR = 45-59 mL/min/1.73 square meters)    Stage 3B Moderate CKD (GFR = 30-44 mL/min/1.73 square meters)    Stage 4 Severe CKD (GFR = 15-29 mL/min/1.73 square meters)    Stage 5 End Stage CKD (GFR <15 mL/min/1.73 square meters)  Note: GFR calculation is accurate only with a steady state creatinine    Lipase [[id number]]  (Normal) Collected: 03/08/24 0538    Lab Status: Final result Specimen: Blood from Arm, Left Updated: 03/08/24 0610     Lipase 17 u/L     UA w Reflex to Microscopic w Reflex to Culture [[id number]]  (Abnormal) Collected: 03/08/24 0532    Lab Status: Final result Specimen: Urine, Clean Catch Updated: 03/08/24 0600     Color, UA Yellow     Clarity, UA Turbid     Specific Gravity, UA 1.027     pH, UA 5.5     Leukocytes, UA Negative     Nitrite, UA Negative     Protein, UA 30 (1+) mg/dl      Glucose, UA Negative mg/dl      Ketones, UA Trace mg/dl      Urobilinogen, UA <2.0 mg/dl      Bilirubin, UA Negative     Occult Blood, UA Trace    Urine Microscopic [[id number]] Collected: 03/08/24 0532    Lab Status: In  process Specimen: Urine, Clean Catch Updated: 03/08/24 0600    CBC and differential [[id number]] Collected: 03/08/24 0538    Lab Status: Final result Specimen: Blood from Arm, Left Updated: 03/08/24 0552     WBC 6.99 Thousand/uL      RBC 4.47 Million/uL      Hemoglobin 12.6 g/dL      Hematocrit 39.0 %      MCV 87 fL      MCH 28.2 pg      MCHC 32.3 g/dL      RDW 14.4 %      MPV 9.2 fL      Platelets 320 Thousands/uL      nRBC 0 /100 WBCs      Neutrophils Relative 55 %      Immat GRANS % 0 %      Lymphocytes Relative 31 %      Monocytes Relative 12 %      Eosinophils Relative 2 %      Basophils Relative 0 %      Neutrophils Absolute 3.78 Thousands/µL      Immature Grans Absolute 0.02 Thousand/uL      Lymphocytes Absolute 2.18 Thousands/µL      Monocytes Absolute 0.82 Thousand/µL      Eosinophils Absolute 0.16 Thousand/µL      Basophils Absolute 0.03 Thousands/µL     TSH, 3rd generation with Free T4 reflex [[id number]] Collected: 03/08/24 0538    Lab Status: In process Specimen: Blood from Arm, Left Updated: 03/08/24 0549    FLU/RSV/COVID - if FLU/RSV clinically relevant [[id number]] Collected: 03/08/24 0532    Lab Status: In process Specimen: Nares from Nose Updated: 03/08/24 0544    POCT pregnancy, urine [[id number]]  (Normal) Resulted: 03/08/24 0542    Lab Status: Final result Updated: 03/08/24 0543     EXT Preg Test, Ur Negative     Control Valid    Stool Enteric Bacterial Panel by PCR [[id number]]     Lab Status: No result Specimen: Stool from Rectum     Clostridium difficile toxin by PCR with EIA [[id number]]     Lab Status: No result Specimen: Stool from Rectum                    CT abdomen pelvis with contrast    (Results Pending)              Procedures  Procedures         ED Course  ED Course as of 03/08/24 0618   Fri Mar 08, 2024   0544 EKG: NSR at 82 BPM, , QTc 460, no ST elevation or depression as interpreted by me        SBIRT 22yo+      Flowsheet Row Most Recent Value   Initial Alcohol Screen: US AUDIT-C      1. How often do you have a drink containing alcohol? 0 Filed at: 03/08/2024 0457   2. How many drinks containing alcohol do you have on a typical day you are drinking?  0 Filed at: 03/08/2024 0457   3a. Male UNDER 65: How often do you have five or more drinks on one occasion? 0 Filed at: 03/08/2024 0457   3b. FEMALE Any Age, or MALE 65+: How often do you have 4 or more drinks on one occassion? 0 Filed at: 03/08/2024 0457   Audit-C Score 0 Filed at: 03/08/2024 0457   MARIALUISA: How many times in the past year have you...    Used an illegal drug or used a prescription medication for non-medical reasons? Never Filed at: 03/08/2024 0457                      Medical Decision Making  DDx including but not limited to: appendicitis, gastroenteritis, gastritis, PUD, GERD, gastroparesis, hepatitis, pancreatitis, colitis, enteritis, food poisoning, mesenteric adenitis, IBD, IBS, ileus, bowel obstruction, intussusception, volvulus, cholecystitis, biliary colic, choledocholithiasis, perforated viscus, splenic etiology, constipation, pelvic pathology, renal colic, pyelonephritis, UTI.    Will obtain EKG, troponin to evaluate for ACS.  Will obtain CBC to evaluate for leukocytosis, anemia.  Will obtain CMP to evaluate kidney function, for electrolyte disturbance.   Will obtain COVID/flu/RSV testing.  Will obtain UA to evaluate for UTI.  Will obtain lipase to evaluate for pancreatitis.  Will obtain urine pregnancy. Will obtain CT abdomen and pelvis.     Care turned over to Jacki Peace PA-C pending CT abdomen and pelvis, remainder of labs.     Problems Addressed:  Abdominal pain: acute illness or injury  Diarrhea: acute illness or injury    Amount and/or Complexity of Data Reviewed  External Data Reviewed: labs and notes.  Labs: ordered. Decision-making details documented in ED Course.  Radiology: ordered. Decision-making details documented in ED Course.    Risk  OTC drugs.  Prescription drug management.              Disposition  Final diagnoses:   None     ED Disposition       None          Follow-up Information    None         Patient's Medications   Discharge Prescriptions    No medications on file       No discharge procedures on file.    PDMP Review         Value Time User    PDMP Reviewed  Yes 8/3/2021  8:03 AM Cb aBrclay DO            ED Provider  Electronically Signed by             Lou Al PA-C  03/08/24 0651

## 2024-04-11 ENCOUNTER — TELEPHONE (OUTPATIENT)
Age: 39
End: 2024-04-11

## 2024-04-11 ENCOUNTER — TELEPHONE (OUTPATIENT)
Dept: OBGYN CLINIC | Facility: MEDICAL CENTER | Age: 39
End: 2024-04-11

## 2024-04-11 ENCOUNTER — OFFICE VISIT (OUTPATIENT)
Dept: OBGYN CLINIC | Facility: MEDICAL CENTER | Age: 39
End: 2024-04-11

## 2024-04-11 VITALS
DIASTOLIC BLOOD PRESSURE: 70 MMHG | HEIGHT: 61 IN | SYSTOLIC BLOOD PRESSURE: 100 MMHG | HEART RATE: 69 BPM | WEIGHT: 224 LBS | BODY MASS INDEX: 42.29 KG/M2

## 2024-04-11 DIAGNOSIS — S93.491D SPRAIN OF ANTERIOR TALOFIBULAR LIGAMENT OF RIGHT ANKLE, SUBSEQUENT ENCOUNTER: ICD-10-CM

## 2024-04-11 DIAGNOSIS — Q66.6 PES PLANOVALGUS: ICD-10-CM

## 2024-04-11 DIAGNOSIS — S92.251D CLOSED AVULSION FRACTURE OF NAVICULAR BONE OF RIGHT FOOT WITH ROUTINE HEALING, SUBSEQUENT ENCOUNTER: Primary | ICD-10-CM

## 2024-04-11 DIAGNOSIS — M25.571 CHRONIC PAIN OF RIGHT ANKLE: ICD-10-CM

## 2024-04-11 DIAGNOSIS — G89.29 CHRONIC PAIN OF RIGHT ANKLE: ICD-10-CM

## 2024-04-11 NOTE — PROGRESS NOTES
Assessment/Plan:    Diagnoses and all orders for this visit:    Closed avulsion fracture of navicular bone of right foot with routine healing, subsequent encounter    Sprain of anterior talofibular ligament of right ankle, subsequent encounter    Chronic pain of right ankle    Pes planovalgus    Patient's continues with pain symptoms.  She has been participating in significant and extensive physical therapy.  She does have an appointment with an outside surgical podiatrist tomorrow would defer treatment to them.  We have provided a note to keep her out of work until further notice or next appointment.  Patient is only following with me for work status, if surgical podiatrist is able to take over her work status she no longer needs to follow up here.  Fortunately there is nothing else I can offer.    TIKI results not available    Return in about 5 weeks (around 5/16/2024).      Subjective:   Patient ID: Renee Wall is a 38 y.o. female.    WC 8/11/23    Renee returns s/p TIKI.  She is scheduled for outside surgical DPM tomorrow.  She has been continuing PT typically 2 times per week    Previous note:  Renee returns experiencing throbbing at night time also worse with bad weather, benefiting from IBU or Voltaren.  Pain also worse with prolonged standing which can be medial and anterior.  Experiencing swelling, using compression socks.  Pain has been 6-7/10.    Scheduled for TIKI 3/20.  She sees her outside surgical DPM doctor tomorrow     Previous note:  Patient returns with some improvement, having been seen by outside physician and received 2 lateral ankle CSIs in January with mild improvement, pain still stays 4-5/10.  She notes anterior ankle and dorsal midfoot pain radiating into lateral ankle.  She's completed about 24 sessions of PT.    She is using ankle brace and was discussed in PT to wean from brace    Previous note:  Patient returns feeling 50% baseline, she notes medial and anterior ankle pain  "especially with standing 15 minutes having to rest and sit.    s/p Ortho consult who recommended \"  · Her MRI does not show any surgical issue.  The avulsion fracture of her navicular was noted on Xray and also seen on MRI.  · Her right ankle is significantly weak in all planes compared to the contralateral ankle  · Continue PT/HEP as directed   · WBAT in supportive sneaker   · Compression sock for swelling control   She is scheduled to see other physicians possibly TIKI.      Previous note:  Patient returns to review [facility].  She still has pain medial and anterior ankle.  She's utilizing ankle brace.      Previous note:  Patient returns with continued pain, she started PT and had 3 total visits. She notes medial, anterior and lateral pain.  Weaned from boot into ankle brace.  Pain with WB, found she can only drive for 10 min due to increased pain.    PT at [facility]     Previous note:  Patient returns unable to transition out of CAM boot due to anterior lateral pain, she notes the lace up ankle brace pushing on her inner ankle and causing pain.    PT has not been set up yet.      Previous note:  Patient returns wearing CAM boot and using crutches.  She notes continued pain of the dorsal proximal foot and has tried to wean from CAM.    She notes that physical therapy was scheduled on 9/13 however this was canceled as it had to be reschedule through Worker's Comp.    Initial note:  36yo female presenting to clinic for evaluation of right ankle pain. Pt works for [employer] when she tripped over step and fell last Friday 8/11/2023. She notes that she inverted her ankle during fall. She also endorses hearing a crunching sound when she fell. Pain occurs daily and is aggravated with use. Pain is 9/10 but subsides to 6/10 with NSAIDs. She also puts on on foot which has helped with swelling.     Ankle Injury  This is a new problem. The current episode started 1 to 4 weeks ago. The " problem occurs daily. The problem has been gradually improving. Associated symptoms include numbness (plantar aspect of big toe) and weakness. The symptoms are aggravated by standing and walking. She has tried NSAIDs, ice and relaxation for the symptoms. The treatment provided mild relief.          Review of Systems    The following portions of the patient's chart were reviewed and updated as appropriate:   Allergy:  No Known Allergies    Medications:    Current Outpatient Medications:     albuterol (2.5 mg/3 mL) 0.083 % nebulizer solution, Take 3 mL (2.5 mg total) by nebulization every 6 (six) hours as needed for wheezing or shortness of breath, Disp: 75 mL, Rfl: 3    albuterol (PROVENTIL HFA,VENTOLIN HFA) 90 mcg/act inhaler, Inhale 2 puffs every 4 (four) hours as needed (as needed), Disp: 18 g, Rfl: 2    budesonide-formoterol (SYMBICORT) 160-4.5 mcg/act inhaler, Inhale 2 puffs 2 (two) times a day Rinse mouth after use., Disp: 30.6 g, Rfl: 1    celecoxib (CeleBREX) 200 mg capsule, Take 1 capsule (200 mg total) by mouth 2 (two) times a day, Disp: 180 capsule, Rfl: 1    cyclobenzaprine (FLEXERIL) 5 mg tablet, Take 1 tablet (5 mg total) by mouth daily at bedtime, Disp: 30 tablet, Rfl: 6    Diclofenac Sodium (VOLTAREN) 1 %, Apply 2 g topically 4 (four) times a day, Disp: 100 g, Rfl: 6    dicyclomine (BENTYL) 20 mg tablet, Take 1 tablet (20 mg total) by mouth 2 (two) times a day, Disp: 20 tablet, Rfl: 0    ergocalciferol (VITAMIN D2) 50,000 units, Take 1 capsule (50,000 Units total) by mouth once a week, Disp: 12 capsule, Rfl: 1    famotidine (PEPCID) 20 mg tablet, Take 1 tablet (20 mg total) by mouth 2 (two) times a day, Disp: 30 tablet, Rfl: 0    fexofenadine-pseudoephedrine (Allegra-D Allergy & Congestion) 180-240 MG per 24 hr tablet, Take 1 tablet by mouth daily, Disp: 90 tablet, Rfl: 1    omeprazole (PriLOSEC) 40 MG capsule, Take 1 capsule (40 mg total) by mouth daily, Disp: 90 capsule, Rfl: 1    ondansetron  "(ZOFRAN-ODT) 4 mg disintegrating tablet, Take 1 tablet (4 mg total) by mouth every 6 (six) hours as needed for nausea or vomiting, Disp: 12 tablet, Rfl: 0    Insulin Pen Needle 32G X 4 MM MISC, Use in the morning (Patient not taking: Reported on 3/8/2024), Disp: 100 each, Rfl: 1    liraglutide (SAXENDA) injection, Inject 0.6 mg subcutaneously once per day for the first week. Increase in weekly intervals by 0.6 mg until a dose of 3 mg is reached (Patient not taking: Reported on 3/8/2024), Disp: 15 mL, Rfl: 3    sulfaSALAzine (AZULFIDINE) 500 mg tablet, Take 1 tab daily for a week, then 1 tab twice a day for a week, then 2 tabs twice a day (Patient not taking: Reported on 3/8/2024), Disp: 120 tablet, Rfl: 6    Patient Active Problem List   Diagnosis    Asthma, mild persistent    BPPV (benign paroxysmal positional vertigo)    Mild single current episode of major depressive disorder (HCC)    Seasonal allergies    History of anxiety disorder    Primary insomnia    Chronic arthralgias of knees and hips    Myalgia    Chronic bilateral low back pain without sciatica    Gastroesophageal reflux disease without esophagitis    Obesity, Class III, BMI 40-49.9 (morbid obesity) (HCC)    Left breast mass    Abnormal finding on breast imaging    Family history of breast cancer    Family history of ovarian cancer    Bilateral hand pain    Right ankle sprain    Avulsion fracture of navicular bone of right foot    Sprain of anterior talofibular ligament of right ankle, subsequent encounter    Closed avulsion fracture of navicular bone of right foot with routine healing, subsequent encounter       Objective:  /70   Pulse 69   Ht 5' 1\" (1.549 m)   Wt 102 kg (224 lb)   BMI 42.32 kg/m²     Ortho Exam    Physical Exam      Neurologic Exam    Procedures    I have personally reviewed the written report of the pertinent studies.             Past Medical History:   Diagnosis Date    Allergic     Anxiety     Asthma     BRCA1 negative  "    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     Depression     GERD (gastroesophageal reflux disease)     Obesity     Scoliosis     Visual impairment        Past Surgical History:   Procedure Laterality Date    HERNIA REPAIR         Social History     Socioeconomic History    Marital status: Single     Spouse name: Not on file    Number of children: Not on file    Years of education: Not on file    Highest education level: Not on file   Occupational History    Occupation: unemployed   Tobacco Use    Smoking status: Former     Current packs/day: 0.00     Types: Cigarettes     Quit date:      Years since quittin.2    Smokeless tobacco: Never   Vaping Use    Vaping status: Never Used   Substance and Sexual Activity    Alcohol use: Yes     Comment: occasionally    Drug use: Never    Sexual activity: Yes     Partners: Male   Other Topics Concern    Not on file   Social History Narrative    Not on file     Social Determinants of Health     Financial Resource Strain: Low Risk  (2020)    Overall Financial Resource Strain (CARDIA)     Difficulty of Paying Living Expenses: Not hard at all   Food Insecurity: No Food Insecurity (2020)    Hunger Vital Sign     Worried About Running Out of Food in the Last Year: Never true     Ran Out of Food in the Last Year: Never true   Transportation Needs: No Transportation Needs (2020)    PRAPARE - Transportation     Lack of Transportation (Medical): No     Lack of Transportation (Non-Medical): No   Physical Activity: Insufficiently Active (2020)    Exercise Vital Sign     Days of Exercise per Week: 3 days     Minutes of Exercise per Session: 30 min   Stress: No Stress Concern Present (2020)    [employer] - Occupational Stress Questionnaire     Feeling of Stress : Not at all   Social Connections: Moderately Isolated (2020)    Social Connection and Isolation Panel [NHANES]     Frequency of Communication with Friends and Family: More than three  times a week     Frequency of Social Gatherings with Friends and Family: More than three times a week     Attends Ep[employer] Services: 1 to 4 times per year     Active Member of Clubs or Organizations: No     Attends Club or Organization Meetings: Never     Marital Status: Never    Intimate Partner Violence: Not At Risk (7/23/2020)    Humiliation, Afraid, Rape, and Kick questionnaire     Fear of Current or Ex-Partner: No     Emotionally Abused: No     Physically Abused: No     Sexually Abused: No   Housing Stability: Not on file       Family History   Problem Relation Age of Onset    Heart disease Mother     Thyroid disease Mother     Arthritis Mother     Diabetes Mother     Heart attack Mother     Depression Father     Arthritis Father     No Known Problems Sister     No Known Problems Brother     No Known Problems Daughter     No Known Problems Son     Ovarian cancer Maternal Aunt         50s    Diabetes Maternal Aunt     Colon cancer Maternal Aunt     Diabetes Maternal Aunt     Diabetes Maternal Uncle     Diabetes Maternal Uncle     Sickle cell anemia Paternal Aunt     Sickle cell anemia Paternal Aunt     No Known Problems Paternal Aunt     No Known Problems Paternal Aunt     No Known Problems Paternal Aunt     No Known Problems Paternal Aunt     No Known Problems Paternal Uncle     No Known Problems Paternal Uncle     Sickle cell anemia Paternal Uncle     Coronary artery disease Maternal Grandmother     Hyperlipidemia Maternal Grandmother     Thyroid disease Maternal Grandmother     Asthma Maternal Grandmother     Arthritis Maternal Grandmother     Vision loss Maternal Grandmother     Breast cancer Maternal Grandmother         age unknown    Diabetes Maternal Grandmother     Hyperlipidemia Maternal Grandfather     Thyroid disease Maternal Grandfather     Diabetes Maternal Grandfather     No Known Problems Paternal Grandmother     No Known Problems Paternal Grandfather

## 2024-04-11 NOTE — TELEPHONE ENCOUNTER
Caller: Divina nurse     Doctor: Shreya    Reason for call: Nurse  would like to speak to the Dr or the PA-C regarding the patient     Call back#: [phone]

## 2024-04-11 NOTE — LETTER
April 11, 2024     Patient: Renee Wall  YOB: 1985  Date of Visit: 4/11/2024      To Whom it May Concern:    Renee Wall is under my professional care. Renee was seen in my office on 4/11/2024. Work excuse until further notice.  Continue PT.  F/U 5 weeks.    If you have any questions or concerns, please don't hesitate to call.         Sincerely,          Wally Cash MD        CC: No Recipients

## 2024-04-15 ENCOUNTER — TELEPHONE (OUTPATIENT)
Age: 39
End: 2024-04-15

## 2024-04-15 NOTE — TELEPHONE ENCOUNTER
Caller: Divina     Doctor: Shreya    Reason for call: Could you please call Divina to go over plan of care    Call back#: [phone]

## 2024-04-29 ENCOUNTER — HOSPITAL ENCOUNTER (OUTPATIENT)
Facility: MEDICAL CENTER | Age: 39
Discharge: HOME | End: 2024-04-29
Payer: COMMERCIAL

## 2024-04-29 DIAGNOSIS — T14.8XXA FRACTURE: ICD-10-CM

## 2024-04-29 PROCEDURE — 73718 MRI LOWER EXTREMITY W/O DYE: CPT

## 2024-04-29 PROCEDURE — 73721 MRI JNT OF LWR EXTRE W/O DYE: CPT

## 2024-04-30 ENCOUNTER — ANNUAL EXAM (OUTPATIENT)
Dept: OBGYN CLINIC | Facility: MEDICAL CENTER | Age: 39
End: 2024-04-30
Payer: COMMERCIAL

## 2024-04-30 VITALS
HEIGHT: 61 IN | BODY MASS INDEX: 42.24 KG/M2 | SYSTOLIC BLOOD PRESSURE: 112 MMHG | WEIGHT: 223.7 LBS | DIASTOLIC BLOOD PRESSURE: 76 MMHG

## 2024-04-30 DIAGNOSIS — Z12.4 PAP SMEAR FOR CERVICAL CANCER SCREENING: ICD-10-CM

## 2024-04-30 DIAGNOSIS — N89.8 VAGINAL ODOR: ICD-10-CM

## 2024-04-30 DIAGNOSIS — Z01.419 ENCOUNTER FOR WELL WOMAN EXAM WITH ROUTINE GYNECOLOGICAL EXAM: Primary | ICD-10-CM

## 2024-04-30 DIAGNOSIS — R10.2 PELVIC PAIN: ICD-10-CM

## 2024-04-30 PROCEDURE — 87480 CANDIDA DNA DIR PROBE: CPT | Performed by: OBSTETRICS & GYNECOLOGY

## 2024-04-30 PROCEDURE — G0145 SCR C/V CYTO,THINLAYER,RESCR: HCPCS | Performed by: OBSTETRICS & GYNECOLOGY

## 2024-04-30 PROCEDURE — 87660 TRICHOMONAS VAGIN DIR PROBE: CPT | Performed by: OBSTETRICS & GYNECOLOGY

## 2024-04-30 PROCEDURE — G0476 HPV COMBO ASSAY CA SCREEN: HCPCS | Performed by: OBSTETRICS & GYNECOLOGY

## 2024-04-30 PROCEDURE — 87510 GARDNER VAG DNA DIR PROBE: CPT | Performed by: OBSTETRICS & GYNECOLOGY

## 2024-04-30 PROCEDURE — 99385 PREV VISIT NEW AGE 18-39: CPT | Performed by: OBSTETRICS & GYNECOLOGY

## 2024-04-30 NOTE — PROGRESS NOTES
"[facility] [address], [place], [place]    ASSESSMENT/PLAN: Renee Wall is a 38 y.o.  who presents for annual gynecologic exam.    Encounter for routine gynecologic examination  - Routine well woman exam completed today.  - Cervical Cancer Screening: Current ASCCP Guidelines reviewed. Last Pap: 2024 . Next Pap Due: today  - Contraceptive counseling discussed.  Current contraception: none, trying to conceive     Additional problems addressed during this visit:  1. Encounter for well woman exam with routine gynecological exam    2. Pap smear for cervical cancer screening  -     Liquid-based pap, screening    3. Pelvic pain  -     US pelvis complete w transvaginal; Future; Expected date: 2024    4. Vaginal odor  -     Vaginosis DNA Probe        CC:  Annual Gynecologic Examination    HPI: Renee Wall is a 38 y.o.  who presents for annual gynecologic examination.  HPI  Patient presents for routine annual exam. States her cycles have become heavier and more painful. Had the IUD in the past and was happy with it.   Has been trying to conceive. Partner has not fathered children in the past.   Recommend that he get a semen analysis.   The following portions of the patient's history were reviewed and updated as appropriate: allergies, current medications, past family history, past medical history, obstetric history, gynecologic history, past social history, past surgical history and problem list.    Review of Systems   Constitutional: Negative.    HENT: Negative.     Eyes: Negative.    Respiratory: Negative.     Cardiovascular: Negative.    Gastrointestinal: Negative.    Genitourinary:  Positive for menstrual problem.   Musculoskeletal: Negative.    All other systems reviewed and are negative.        Objective:  /76   Ht 5' 1\" (1.549 m)   Wt 101 kg (223 lb 11.2 oz)   LMP 2024 (Approximate)   BMI 42.27 kg/m²    Physical Exam  Vitals reviewed. "   Constitutional:       General: She is not in acute distress.     Appearance: She is well-developed.   HENT:      Head: Normocephalic and atraumatic.      Nose: Nose normal.   Cardiovascular:      Rate and Rhythm: Normal rate.   Pulmonary:      Effort: Pulmonary effort is normal. No respiratory distress.   Chest:   Breasts:     Breasts are symmetrical.      Right: Normal. No mass, nipple discharge, skin change or tenderness.      Left: Normal. No mass, nipple discharge, skin change or tenderness.   Abdominal:      General: There is no distension.      Palpations: Abdomen is soft. There is no mass.      Tenderness: There is no abdominal tenderness. There is no guarding or rebound.   Genitourinary:     General: Normal vulva.      Exam position: Lithotomy position.      Labia:         Right: No lesion.         Left: No lesion.       Urethra: No prolapse (urethral meatus normal).      Vagina: Normal. No vaginal discharge, erythema or bleeding.      Cervix: Normal.      Uterus: Normal.       Adnexa: Right adnexa normal and left adnexa normal.   Musculoskeletal:         General: Normal range of motion.      Cervical back: Normal range of motion.   Lymphadenopathy:      Upper Body:      Right upper body: No supraclavicular, axillary or pectoral adenopathy.      Left upper body: No supraclavicular, axillary or pectoral adenopathy.      Lower Body: No right inguinal adenopathy. No left inguinal adenopathy.   Skin:     General: Skin is warm and dry.   Neurological:      Mental Status: She is alert and oriented to person, place, and time.   Psychiatric:         Behavior: Behavior normal.         Thought Content: Thought content normal.         Judgment: Judgment normal.

## 2024-05-01 DIAGNOSIS — N76.0 BV (BACTERIAL VAGINOSIS): Primary | ICD-10-CM

## 2024-05-01 DIAGNOSIS — B96.89 BV (BACTERIAL VAGINOSIS): Primary | ICD-10-CM

## 2024-05-01 LAB
CANDIDA RRNA VAG QL PROBE: NOT DETECTED
G VAGINALIS RRNA GENITAL QL PROBE: DETECTED
HPV HR 12 DNA CVX QL NAA+PROBE: NEGATIVE
HPV16 DNA CVX QL NAA+PROBE: NEGATIVE
HPV18 DNA CVX QL NAA+PROBE: NEGATIVE
T VAGINALIS RRNA GENITAL QL PROBE: NOT DETECTED

## 2024-05-01 RX ORDER — METRONIDAZOLE 500 MG/1
500 TABLET ORAL EVERY 12 HOURS SCHEDULED
Qty: 14 TABLET | Refills: 0 | Status: SHIPPED | OUTPATIENT
Start: 2024-05-01 | End: 2024-05-08

## 2024-05-02 ENCOUNTER — TELEPHONE (OUTPATIENT)
Age: 39
End: 2024-05-02

## 2024-05-02 NOTE — TELEPHONE ENCOUNTER
----- Message from Marija Blackwood MD sent at 5/1/2024  3:38 PM EDT -----  Please call Renee and advise results of cultures are positive for BV. A script for treatment was sent to her pharmacy.    Marija Blackwood MD

## 2024-05-02 NOTE — TELEPHONE ENCOUNTER
Spoke with patient regarding results and medication sent to pharmacy.  No additional questions or concerns.

## 2024-05-03 ENCOUNTER — TELEPHONE (OUTPATIENT)
Age: 39
End: 2024-05-03

## 2024-05-03 NOTE — TELEPHONE ENCOUNTER
Caller: JEREMI Murcia     Doctor: Shreya     Reason for call: asked for a call back to discuss patient     Call back#: [phone]

## 2024-05-08 LAB
LAB AP GYN PRIMARY INTERPRETATION: NORMAL
Lab: NORMAL
PATH INTERP SPEC-IMP: NORMAL

## 2024-05-13 ENCOUNTER — HOSPITAL ENCOUNTER (OUTPATIENT)
Dept: ULTRASOUND IMAGING | Facility: HOSPITAL | Age: 39
Discharge: HOME | End: 2024-05-13
Attending: OBSTETRICS & GYNECOLOGY
Payer: COMMERCIAL

## 2024-05-13 DIAGNOSIS — R10.2 PELVIC PAIN: ICD-10-CM

## 2024-05-13 PROCEDURE — 76830 TRANSVAGINAL US NON-OB: CPT

## 2024-05-13 PROCEDURE — 76856 US EXAM PELVIC COMPLETE: CPT

## 2024-05-15 ENCOUNTER — TELEPHONE (OUTPATIENT)
Age: 39
End: 2024-05-15

## 2024-05-15 NOTE — TELEPHONE ENCOUNTER
W/C confirming if patient has a f/u- made them aware she had an 5/16 but patient canceled- No further questions

## 2024-05-21 ENCOUNTER — OFFICE VISIT (OUTPATIENT)
Dept: FAMILY MEDICINE CLINIC | Facility: CLINIC | Age: 39
End: 2024-05-21
Payer: COMMERCIAL

## 2024-05-21 VITALS
SYSTOLIC BLOOD PRESSURE: 112 MMHG | WEIGHT: 221 LBS | TEMPERATURE: 96.8 F | BODY MASS INDEX: 41.72 KG/M2 | HEART RATE: 84 BPM | DIASTOLIC BLOOD PRESSURE: 78 MMHG | HEIGHT: 61 IN | OXYGEN SATURATION: 98 %

## 2024-05-21 DIAGNOSIS — Z00.00 WELL ADULT EXAM: ICD-10-CM

## 2024-05-21 DIAGNOSIS — H90.5 SENSORINEURAL HEARING LOSS (SNHL), UNSPECIFIED LATERALITY: ICD-10-CM

## 2024-05-21 DIAGNOSIS — J30.2 SEASONAL ALLERGIES: ICD-10-CM

## 2024-05-21 DIAGNOSIS — J45.30 MILD PERSISTENT ASTHMA, UNSPECIFIED WHETHER COMPLICATED: Primary | ICD-10-CM

## 2024-05-21 DIAGNOSIS — J45.30 MILD PERSISTENT ASTHMA WITHOUT COMPLICATION: ICD-10-CM

## 2024-05-21 PROCEDURE — 99395 PREV VISIT EST AGE 18-39: CPT | Performed by: FAMILY MEDICINE

## 2024-05-21 PROCEDURE — 99214 OFFICE O/P EST MOD 30 MIN: CPT | Performed by: FAMILY MEDICINE

## 2024-05-21 RX ORDER — ALBUTEROL SULFATE 90 UG/1
2 AEROSOL, METERED RESPIRATORY (INHALATION) EVERY 4 HOURS PRN
Qty: 18 G | Refills: 2 | Status: SHIPPED | OUTPATIENT
Start: 2024-05-21 | End: 2024-05-22

## 2024-05-21 RX ORDER — FEXOFENADINE HCL AND PSEUDOEPHEDRINE HCI 180; 240 MG/1; MG/1
1 TABLET, EXTENDED RELEASE ORAL DAILY
Qty: 90 TABLET | Refills: 1 | Status: SHIPPED | OUTPATIENT
Start: 2024-05-21

## 2024-05-21 RX ORDER — BUDESONIDE AND FORMOTEROL FUMARATE DIHYDRATE 160; 4.5 UG/1; UG/1
2 AEROSOL RESPIRATORY (INHALATION) 2 TIMES DAILY
Qty: 30.6 G | Refills: 1 | Status: SHIPPED | OUTPATIENT
Start: 2024-05-21 | End: 2024-05-31

## 2024-05-21 NOTE — PROGRESS NOTES
Assessment/Plan: Wellness exam done at this time.  Vaccines reviewed and up-to-date.  Laboratory studies up-to-date.  Patient will continue to have low-carb diet.  Patient will try to lose weight exercise and diet appropriately.  Patient up-to-date with gynecologist.  No early family history of colorectal cancer.  Patient will continue with current regimen for asthma and allergies.  Refills given at this time.  Patient will be given samples of Nurtec.  Follow-up in 6 months or as needed.  Patient referred to audiologist for hearing loss.  Patient will be referred to allergy for allergens.  Follow-up 6 months       Diagnoses and all orders for this visit:    Mild persistent asthma, unspecified whether complicated  -     budesonide-formoterol (SYMBICORT) 160-4.5 mcg/act inhaler; Inhale 2 puffs 2 (two) times a day Rinse mouth after use.    Well adult exam    Mild persistent asthma without complication  -     albuterol (PROVENTIL HFA,VENTOLIN HFA) 90 mcg/act inhaler; Inhale 2 puffs every 4 (four) hours as needed (as needed)    Seasonal allergies  -     fexofenadine-pseudoephedrine (Allegra-D Allergy & Congestion) 180-240 MG per 24 hr tablet; Take 1 tablet by mouth daily  -     Ambulatory Referral to Allergy; Future    Sensorineural hearing loss (SNHL), unspecified laterality  -     Ambulatory Referral to Audiology; Future            Subjective:        Patient ID: Renee Wall is a 38 y.o. female.      Patient is here for wellness exam.  Labs vaccines reviewed.  Patient up-to-date with gynecologist.  Patient up-to-date with ultrasounds and mammograms of the breast.  No early family history of colorectal cancer.  Patient with some allergic issues.  Patient also to follow-up on asthma allergies migraines.  Patient is having increased migraines recently.  Patient also with decreased hearing.          The following portions of the patient's history were reviewed and updated as appropriate: allergies, current medications,  "past family history, past medical history, past social history, past surgical history and problem list.      Review of Systems   Constitutional: Negative.    HENT:  Positive for hearing loss.    Eyes: Negative.    Respiratory: Negative.     Cardiovascular: Negative.    Gastrointestinal: Negative.    Endocrine: Negative.    Genitourinary: Negative.    Musculoskeletal:  Positive for arthralgias.   Skin: Negative.    Allergic/Immunologic: Negative.    Neurological:  Positive for headaches.   Hematological: Negative.    Psychiatric/Behavioral: Negative.             Objective:        Depression Screening and Follow-up Plan: Patient was screened for depression during today's encounter. They screened negative with a PHQ-9 score of 0.            /78   Pulse 84   Temp (!) 96.8 °F (36 °C) (Temporal)   Ht 5' 1\" (1.549 m)   Wt 100 kg (221 lb)   LMP 04/09/2024 (Approximate)   SpO2 98%   BMI 41.76 kg/m²          Physical Exam  Vitals and nursing note reviewed.   Constitutional:       General: She is not in acute distress.     Appearance: Normal appearance. She is not ill-appearing, toxic-appearing or diaphoretic.   HENT:      Head: Normocephalic and atraumatic.      Right Ear: Tympanic membrane, ear canal and external ear normal. There is no impacted cerumen.      Left Ear: Tympanic membrane, ear canal and external ear normal. There is no impacted cerumen.      Nose: Nose normal. No congestion or rhinorrhea.      Mouth/Throat:      Mouth: Mucous membranes are moist.      Pharynx: No oropharyngeal exudate or posterior oropharyngeal erythema.   Eyes:      General: No scleral icterus.        Right eye: No discharge.         Left eye: No discharge.   Neck:      Vascular: No carotid bruit.   Cardiovascular:      Rate and Rhythm: Normal rate and regular rhythm.      Pulses: Normal pulses.      Heart sounds: Normal heart sounds. No murmur heard.     No friction rub. No gallop.   Pulmonary:      Effort: Pulmonary effort is " normal. No respiratory distress.      Breath sounds: Normal breath sounds. No stridor. No wheezing, rhonchi or rales.   Chest:      Chest wall: No tenderness.   Musculoskeletal:         General: No swelling, deformity or signs of injury.      Cervical back: Normal range of motion and neck supple. No rigidity. No muscular tenderness.      Right lower leg: No edema.      Left lower leg: No edema.   Lymphadenopathy:      Cervical: No cervical adenopathy.   Skin:     General: Skin is warm and dry.      Capillary Refill: Capillary refill takes less than 2 seconds.      Coloration: Skin is not jaundiced.      Findings: No bruising, erythema, lesion or rash.   Neurological:      Mental Status: She is alert and oriented to person, place, and time. Mental status is at baseline.      Cranial Nerves: No cranial nerve deficit.      Sensory: No sensory deficit.      Motor: No weakness.      Coordination: Coordination normal.      Gait: Gait normal.   Psychiatric:         Mood and Affect: Mood normal.         Behavior: Behavior normal.         Thought Content: Thought content normal.         Judgment: Judgment normal.

## 2024-05-22 ENCOUNTER — TELEPHONE (OUTPATIENT)
Age: 39
End: 2024-05-22

## 2024-05-22 DIAGNOSIS — J45.30 MILD PERSISTENT ASTHMA WITHOUT COMPLICATION: Primary | ICD-10-CM

## 2024-05-22 RX ORDER — LEVALBUTEROL TARTRATE 45 UG/1
1-2 AEROSOL, METERED ORAL EVERY 4 HOURS PRN
Qty: 15 G | Refills: 0 | Status: SHIPPED | OUTPATIENT
Start: 2024-05-22

## 2024-05-22 NOTE — TELEPHONE ENCOUNTER
Called pharmacy and had them run this under new NDC and this was able to go through under generic.    PA for albuterol (PROVENTIL HFA,VENTOLIN HFA) 90 mcg/act inhaler  Denied    Reason:(Screenshot if applicable)        Message sent to office clinical pool Yes    Denial letter scanned into Media Yes    Appeal started No ( Provider will need to decide if appeal is warranted and send clinical documentation to PA team for initiation.)    **Please follow up with your patient regarding denial and next steps**

## 2024-05-22 NOTE — TELEPHONE ENCOUNTER
PA for Symbicort    Submitted via    []CMM-KEY   [x][employer]-Case ID # [id number]   []Faxed to plan   []Other website   []Phone call Case ID #     Office notes sent, clinical questions answered. Awaiting determination    Turnaround time for your insurance to make a decision on your Prior Authorization can take 7-21 business days.

## 2024-05-22 NOTE — TELEPHONE ENCOUNTER
PA for Albuterol inhaler    Submitted via    []CMM-KEY   [x][employer]-Case ID #  [id number]  []Faxed to plan   []Other website   []Phone call Case ID #     Office notes sent, clinical questions answered. Awaiting determination    Turnaround time for your insurance to make a decision on your Prior Authorization can take 7-21 business days.

## 2024-05-24 ENCOUNTER — TELEPHONE (OUTPATIENT)
Dept: BARIATRICS | Facility: CLINIC | Age: 39
End: 2024-05-24

## 2024-05-27 ENCOUNTER — HOSPITAL ENCOUNTER (EMERGENCY)
Facility: HOSPITAL | Age: 39
Discharge: HOME | End: 2024-05-27
Attending: EMERGENCY MEDICINE
Payer: COMMERCIAL

## 2024-05-27 ENCOUNTER — APPOINTMENT (EMERGENCY)
Dept: CT IMAGING | Facility: HOSPITAL | Age: 39
End: 2024-05-27
Payer: COMMERCIAL

## 2024-05-27 ENCOUNTER — APPOINTMENT (EMERGENCY)
Dept: RADIOLOGY | Facility: HOSPITAL | Age: 39
End: 2024-05-27
Payer: COMMERCIAL

## 2024-05-27 VITALS
BODY MASS INDEX: 42.45 KG/M2 | OXYGEN SATURATION: 97 % | RESPIRATION RATE: 18 BRPM | DIASTOLIC BLOOD PRESSURE: 76 MMHG | TEMPERATURE: 98.1 F | SYSTOLIC BLOOD PRESSURE: 115 MMHG | HEART RATE: 80 BPM | WEIGHT: 224.65 LBS

## 2024-05-27 DIAGNOSIS — K02.9 DENTAL CARIES: Primary | ICD-10-CM

## 2024-05-27 DIAGNOSIS — K08.89 PAIN, DENTAL: ICD-10-CM

## 2024-05-27 DIAGNOSIS — L03.211 FACIAL CELLULITIS: ICD-10-CM

## 2024-05-27 LAB
ALBUMIN SERPL BCP-MCNC: 3.9 G/DL (ref 3.5–5)
ALP SERPL-CCNC: 70 U/L (ref 34–104)
ALT SERPL W P-5'-P-CCNC: 12 U/L (ref 7–52)
ANION GAP SERPL CALCULATED.3IONS-SCNC: 7 MMOL/L (ref 4–13)
AST SERPL W P-5'-P-CCNC: 11 U/L (ref 13–39)
BACTERIA UR QL AUTO: ABNORMAL /HPF
BASOPHILS # BLD AUTO: 0.04 THOUSANDS/ÂΜL (ref 0–0.1)
BASOPHILS NFR BLD AUTO: 1 % (ref 0–1)
BILIRUB SERPL-MCNC: 0.36 MG/DL (ref 0.2–1)
BILIRUB UR QL STRIP: NEGATIVE
BUN SERPL-MCNC: 7 MG/DL (ref 5–25)
CALCIUM SERPL-MCNC: 8.6 MG/DL (ref 8.4–10.2)
CHLORIDE SERPL-SCNC: 107 MMOL/L (ref 96–108)
CLARITY UR: CLEAR
CO2 SERPL-SCNC: 24 MMOL/L (ref 21–32)
COLOR UR: YELLOW
CREAT SERPL-MCNC: 0.57 MG/DL (ref 0.6–1.3)
EOSINOPHIL # BLD AUTO: 0.16 THOUSAND/ÂΜL (ref 0–0.61)
EOSINOPHIL NFR BLD AUTO: 2 % (ref 0–6)
ERYTHROCYTE [DISTWIDTH] IN BLOOD BY AUTOMATED COUNT: 14.6 % (ref 11.6–15.1)
EXT PREGNANCY TEST URINE: NEGATIVE
EXT. CONTROL: NORMAL
GFR SERPL CREATININE-BSD FRML MDRD: 118 ML/MIN/1.73SQ M
GLUCOSE SERPL-MCNC: 94 MG/DL (ref 65–140)
GLUCOSE UR STRIP-MCNC: NEGATIVE MG/DL
HCT VFR BLD AUTO: 36.8 % (ref 34.8–46.1)
HGB BLD-MCNC: 11.8 G/DL (ref 11.5–15.4)
HGB UR QL STRIP.AUTO: ABNORMAL
IMM GRANULOCYTES # BLD AUTO: 0.03 THOUSAND/UL (ref 0–0.2)
IMM GRANULOCYTES NFR BLD AUTO: 0 % (ref 0–2)
KETONES UR STRIP-MCNC: NEGATIVE MG/DL
LEUKOCYTE ESTERASE UR QL STRIP: NEGATIVE
LYMPHOCYTES # BLD AUTO: 2.42 THOUSANDS/ÂΜL (ref 0.6–4.47)
LYMPHOCYTES NFR BLD AUTO: 29 % (ref 14–44)
MCH RBC QN AUTO: 28.1 PG (ref 26.8–34.3)
MCHC RBC AUTO-ENTMCNC: 32.1 G/DL (ref 31.4–37.4)
MCV RBC AUTO: 88 FL (ref 82–98)
MONOCYTES # BLD AUTO: 0.54 THOUSAND/ÂΜL (ref 0.17–1.22)
MONOCYTES NFR BLD AUTO: 6 % (ref 4–12)
MUCOUS THREADS UR QL AUTO: ABNORMAL
NEUTROPHILS # BLD AUTO: 5.22 THOUSANDS/ÂΜL (ref 1.85–7.62)
NEUTS SEG NFR BLD AUTO: 62 % (ref 43–75)
NITRITE UR QL STRIP: NEGATIVE
NON-SQ EPI CELLS URNS QL MICRO: ABNORMAL /HPF
NRBC BLD AUTO-RTO: 0 /100 WBCS
PH UR STRIP.AUTO: 7 [PH] (ref 4.5–8)
PLATELET # BLD AUTO: 359 THOUSANDS/UL (ref 149–390)
PMV BLD AUTO: 9.4 FL (ref 8.9–12.7)
POTASSIUM SERPL-SCNC: 3.9 MMOL/L (ref 3.5–5.3)
PROT SERPL-MCNC: 6.9 G/DL (ref 6.4–8.4)
PROT UR STRIP-MCNC: NEGATIVE MG/DL
RBC # BLD AUTO: 4.2 MILLION/UL (ref 3.81–5.12)
RBC #/AREA URNS AUTO: ABNORMAL /HPF
SODIUM SERPL-SCNC: 138 MMOL/L (ref 135–147)
SP GR UR STRIP.AUTO: 1.02 (ref 1–1.03)
UROBILINOGEN UR QL STRIP.AUTO: 1 E.U./DL
WBC # BLD AUTO: 8.41 THOUSAND/UL (ref 4.31–10.16)
WBC #/AREA URNS AUTO: ABNORMAL /HPF

## 2024-05-27 PROCEDURE — 36415 COLL VENOUS BLD VENIPUNCTURE: CPT | Performed by: PHYSICIAN ASSISTANT

## 2024-05-27 PROCEDURE — 96361 HYDRATE IV INFUSION ADD-ON: CPT

## 2024-05-27 PROCEDURE — 80053 COMPREHEN METABOLIC PANEL: CPT | Performed by: PHYSICIAN ASSISTANT

## 2024-05-27 PROCEDURE — 73060 X-RAY EXAM OF HUMERUS: CPT

## 2024-05-27 PROCEDURE — 81025 URINE PREGNANCY TEST: CPT | Performed by: PHYSICIAN ASSISTANT

## 2024-05-27 PROCEDURE — 70491 CT SOFT TISSUE NECK W/DYE: CPT

## 2024-05-27 PROCEDURE — 96375 TX/PRO/DX INJ NEW DRUG ADDON: CPT

## 2024-05-27 PROCEDURE — 99284 EMERGENCY DEPT VISIT MOD MDM: CPT

## 2024-05-27 PROCEDURE — 85025 COMPLETE CBC W/AUTO DIFF WBC: CPT | Performed by: PHYSICIAN ASSISTANT

## 2024-05-27 PROCEDURE — 96374 THER/PROPH/DIAG INJ IV PUSH: CPT

## 2024-05-27 PROCEDURE — 99284 EMERGENCY DEPT VISIT MOD MDM: CPT | Performed by: PHYSICIAN ASSISTANT

## 2024-05-27 PROCEDURE — 81001 URINALYSIS AUTO W/SCOPE: CPT

## 2024-05-27 RX ORDER — AMOXICILLIN AND CLAVULANATE POTASSIUM 875; 125 MG/1; MG/1
1 TABLET, FILM COATED ORAL ONCE
Status: COMPLETED | OUTPATIENT
Start: 2024-05-27 | End: 2024-05-27

## 2024-05-27 RX ORDER — ONDANSETRON 2 MG/ML
4 INJECTION INTRAMUSCULAR; INTRAVENOUS ONCE
Status: COMPLETED | OUTPATIENT
Start: 2024-05-27 | End: 2024-05-27

## 2024-05-27 RX ORDER — MORPHINE SULFATE 4 MG/ML
4 INJECTION, SOLUTION INTRAMUSCULAR; INTRAVENOUS ONCE
Status: COMPLETED | OUTPATIENT
Start: 2024-05-27 | End: 2024-05-27

## 2024-05-27 RX ORDER — AMOXICILLIN AND CLAVULANATE POTASSIUM 875; 125 MG/1; MG/1
1 TABLET, FILM COATED ORAL EVERY 12 HOURS SCHEDULED
Qty: 14 TABLET | Refills: 0 | Status: SHIPPED | OUTPATIENT
Start: 2024-05-27 | End: 2024-06-03

## 2024-05-27 RX ORDER — TRAMADOL HYDROCHLORIDE 50 MG/1
50 TABLET ORAL EVERY 8 HOURS PRN
Qty: 10 TABLET | Refills: 0 | Status: SHIPPED | OUTPATIENT
Start: 2024-05-27

## 2024-05-27 RX ADMIN — AMOXICILLIN AND CLAVULANATE POTASSIUM 1 TABLET: 875; 125 TABLET, FILM COATED ORAL at 17:37

## 2024-05-27 RX ADMIN — MORPHINE SULFATE 4 MG: 4 INJECTION INTRAVENOUS at 14:51

## 2024-05-27 RX ADMIN — IOHEXOL 80 ML: 350 INJECTION, SOLUTION INTRAVENOUS at 15:49

## 2024-05-27 RX ADMIN — SODIUM CHLORIDE 1000 ML: 0.9 INJECTION, SOLUTION INTRAVENOUS at 14:48

## 2024-05-27 RX ADMIN — ONDANSETRON 4 MG: 2 INJECTION INTRAMUSCULAR; INTRAVENOUS at 14:50

## 2024-05-27 NOTE — ED PROVIDER NOTES
History  Chief Complaint   Patient presents with    Facial Swelling     Pt reports right sided facial pain and swelling, possibly related to tooth. Unsure if it is right upper or lower. Reports noticed the pain 3 days ago, but getting worse. Also reports blurry vision behind right eye.      Patient is a 38-year-old female with past medical history of asthma, GERD, depression, migraines who presents for evaluation of right-sided dental pain and facial swelling.  Patient states symptoms started a week ago with some dental pain and have been gradually worsening.  Pain is worse with temperature change or chewing.  She states that she knows she has dental caries and a right lower molar that needs to be extracted however she has not gotten this done yet.  She states that the pain is giving her a migraine and she has pain to the right side of the face and occasional blurry vision in right eye.  She also has had some nausea.  She states that these can be a symptoms of her migraines.  She has been taking over-the-counter medication without significant relief.  She states that she started yesterday and today with some right-sided facial swelling and pain under her right jaw so she came for evaluation.  She denies fever, chest pain, shortness of breath, abdominal pain, vomiting, diarrhea, sore throat or difficulty swallowing, neck stiffness,head injury, numbness, weakness, speech change, eye pain, eye redness, eye discharge, eye injury/trauma, lightheadedness, dizziness, loss of consciousness.  Not on blood thinners. Wears glasses but not contacts.         Prior to Admission Medications   Prescriptions Last Dose Informant Patient Reported? Taking?   Diclofenac Sodium (VOLTAREN) 1 %   No Yes   Sig: Apply 2 g topically 4 (four) times a day   Insulin Pen Needle 32G X 4 MM MISC Not Taking  No No   Sig: Use in the morning   Patient not taking: Reported on 3/8/2024   albuterol (2.5 mg/3 mL) 0.083 % nebulizer solution  Self No Yes    Sig: Take 3 mL (2.5 mg total) by nebulization every 6 (six) hours as needed for wheezing or shortness of breath   budesonide-formoterol (SYMBICORT) 160-4.5 mcg/act inhaler   No Yes   Sig: Inhale 2 puffs 2 (two) times a day Rinse mouth after use.   celecoxib (CeleBREX) 200 mg capsule   No Yes   Sig: Take 1 capsule (200 mg total) by mouth 2 (two) times a day   cyclobenzaprine (FLEXERIL) 5 mg tablet   No Yes   Sig: Take 1 tablet (5 mg total) by mouth daily at bedtime   ergocalciferol (VITAMIN D2) 50,000 units   No Yes   Sig: Take 1 capsule (50,000 Units total) by mouth once a week   famotidine (PEPCID) 20 mg tablet   No Yes   Sig: Take 1 tablet (20 mg total) by mouth 2 (two) times a day   fexofenadine-pseudoephedrine (Allegra-D Allergy & Congestion) 180-240 MG per 24 hr tablet   No Yes   Sig: Take 1 tablet by mouth daily   levalbuterol (Xopenex HFA) 45 mcg/act inhaler   No Yes   Sig: Inhale 1-2 puffs every 4 (four) hours as needed for wheezing or shortness of breath   omeprazole (PriLOSEC) 40 MG capsule  Self No Yes   Sig: Take 1 capsule (40 mg total) by mouth daily   sulfaSALAzine (AZULFIDINE) 500 mg tablet Not Taking  No No   Sig: Take 1 tab daily for a week, then 1 tab twice a day for a week, then 2 tabs twice a day   Patient not taking: Reported on 5/27/2024      Facility-Administered Medications: None       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     Depression     GERD (gastroesophageal reflux disease)     Obesity     Scoliosis     Visual impairment        Past Surgical History:   Procedure Laterality Date    HERNIA REPAIR         Family History   Problem Relation Age of Onset    Heart disease Mother     Thyroid disease Mother     Arthritis Mother     Diabetes Mother     Heart attack Mother     Depression Father     Arthritis Father     No Known Problems Sister     No Known Problems Brother     No Known Problems Daughter     No Known  Problems Son     Ovarian cancer Maternal Aunt         50s    Diabetes Maternal Aunt     Colon cancer Maternal Aunt     Diabetes Maternal Aunt     Diabetes Maternal Uncle     Diabetes Maternal Uncle     Sickle cell anemia Paternal Aunt     Sickle cell anemia Paternal Aunt     No Known Problems Paternal Aunt     No Known Problems Paternal Aunt     No Known Problems Paternal Aunt     No Known Problems Paternal Aunt     No Known Problems Paternal Uncle     No Known Problems Paternal Uncle     Sickle cell anemia Paternal Uncle     Coronary artery disease Maternal Grandmother     Hyperlipidemia Maternal Grandmother     Thyroid disease Maternal Grandmother     Asthma Maternal Grandmother     Arthritis Maternal Grandmother     Vision loss Maternal Grandmother     Breast cancer Maternal Grandmother         age unknown    Diabetes Maternal Grandmother     Hyperlipidemia Maternal Grandfather     Thyroid disease Maternal Grandfather     Diabetes Maternal Grandfather     No Known Problems Paternal Grandmother     No Known Problems Paternal Grandfather      I have reviewed and agree with the history as documented.    E-Cigarette/Vaping    E-Cigarette Use Never User      E-Cigarette/Vaping Substances    Nicotine No     THC No     CBD No     Flavoring No     Other No     Unknown No      Social History     Tobacco Use    Smoking status: Former     Current packs/day: 0.00     Types: Cigarettes     Quit date:      Years since quittin.4    Smokeless tobacco: Never   Vaping Use    Vaping status: Never Used   Substance Use Topics    Alcohol use: Not Currently     Comment: occasionally    Drug use: Never       Review of Systems   Constitutional:  Negative for chills and fever.   HENT:  Positive for dental problem and facial swelling. Negative for drooling, ear discharge, ear pain, mouth sores, rhinorrhea, sinus pain, sore throat and trouble swallowing.    Eyes:  Positive for visual disturbance. Negative for photophobia, pain,  discharge, redness and itching.   Respiratory:  Negative for cough and shortness of breath.    Cardiovascular:  Negative for chest pain.   Gastrointestinal:  Positive for nausea. Negative for abdominal pain, diarrhea and vomiting.   Genitourinary:  Negative for difficulty urinating, dysuria and hematuria.   Musculoskeletal:  Positive for neck pain. Negative for arthralgias, back pain and neck stiffness.   Skin:  Negative for color change and rash.   Neurological:  Positive for headaches. Negative for dizziness, seizures, syncope, facial asymmetry, speech difficulty, weakness, light-headedness and numbness.   All other systems reviewed and are negative.      Physical Exam  Physical Exam  Vitals and nursing note reviewed.   Constitutional:       General: She is not in acute distress.     Appearance: Normal appearance. She is well-developed. She is not ill-appearing, toxic-appearing or diaphoretic.   HENT:      Head: Normocephalic and atraumatic.      Jaw: Tenderness present. No trismus.        Comments: Right lower molar with caries and TTP. No visible dental abscess. Mild overlying facial swelling to right lower jaw line. Mild TTP submandibular area without swelling, redness, warmth. FROM of neck without pain. No sign of ludwigs angina. Handles oral secretions well without difficulty, no trismus, drooling.  Patient is a 38-year-old female with a past medical history of     Right Ear: Tympanic membrane, ear canal and external ear normal.      Left Ear: Tympanic membrane, ear canal and external ear normal.      Nose: Nose normal.      Mouth/Throat:      Mouth: Mucous membranes are moist.      Dentition: Abnormal dentition. Dental tenderness and dental caries present. No dental abscesses.      Pharynx: Oropharynx is clear. Uvula midline. No pharyngeal swelling, oropharyngeal exudate, posterior oropharyngeal erythema, uvula swelling or postnasal drip.     Eyes:      Extraocular Movements: Extraocular movements intact.       Conjunctiva/sclera: Conjunctivae normal.      Pupils: Pupils are equal, round, and reactive to light.   Cardiovascular:      Rate and Rhythm: Normal rate and regular rhythm.      Heart sounds: No murmur heard.  Pulmonary:      Effort: Pulmonary effort is normal. No respiratory distress.      Breath sounds: Normal breath sounds.   Abdominal:      Palpations: Abdomen is soft.      Tenderness: There is no abdominal tenderness.   Musculoskeletal:         General: No swelling.      Cervical back: Full passive range of motion without pain, normal range of motion and neck supple. No edema, erythema or rigidity.   Lymphadenopathy:      Cervical: No cervical adenopathy.   Skin:     General: Skin is warm and dry.      Capillary Refill: Capillary refill takes less than 2 seconds.   Neurological:      Mental Status: She is alert.   Psychiatric:         Mood and Affect: Mood normal.         Vital Signs  ED Triage Vitals   Temperature Pulse Respirations Blood Pressure SpO2   05/27/24 1411 05/27/24 1410 05/27/24 1410 05/27/24 1410 05/27/24 1410   98.1 °F (36.7 °C) 87 18 134/81 99 %      Temp Source Heart Rate Source Patient Position - Orthostatic VS BP Location FiO2 (%)   05/27/24 1411 05/27/24 1410 05/27/24 1410 05/27/24 1410 --   Oral Monitor Sitting Right arm       Pain Score       05/27/24 1410       9           Vitals:    05/27/24 1455 05/27/24 1530 05/27/24 1635 05/27/24 1737   BP: 121/76 122/78 116/73 115/76   Pulse: 77 79 72 80   Patient Position - Orthostatic VS: Sitting Sitting Lying Lying         Visual Acuity      ED Medications  Medications   sodium chloride 0.9 % bolus 1,000 mL (0 mL Intravenous Stopped 5/27/24 1738)   ondansetron (ZOFRAN) injection 4 mg (4 mg Intravenous Given 5/27/24 1450)   morphine injection 4 mg (4 mg Intravenous Given 5/27/24 1451)   iohexol (OMNIPAQUE) 350 MG/ML injection (MULTI-DOSE) 80 mL (80 mL Intravenous Given 5/27/24 1549)   amoxicillin-clavulanate (AUGMENTIN) 875-125 mg per tablet 1  tablet (1 tablet Oral Given 5/27/24 4927)       Diagnostic Studies  Results Reviewed       Procedure Component Value Units Date/Time    Comprehensive metabolic panel [[id number]]  (Abnormal) Collected: 05/27/24 1446    Lab Status: Final result Specimen: Blood from Arm, Right Updated: 05/27/24 1517     Sodium 138 mmol/L      Potassium 3.9 mmol/L      Chloride 107 mmol/L      CO2 24 mmol/L      ANION GAP 7 mmol/L      BUN 7 mg/dL      Creatinine 0.57 mg/dL      Glucose 94 mg/dL      Calcium 8.6 mg/dL      AST 11 U/L      ALT 12 U/L      Alkaline Phosphatase 70 U/L      Total Protein 6.9 g/dL      Albumin 3.9 g/dL      Total Bilirubin 0.36 mg/dL      eGFR 118 ml/min/1.73sq m     Narrative:      National Kidney Disease Foundation guidelines for Chronic Kidney Disease (CKD):     Stage 1 with normal or high GFR (GFR > 90 mL/min/1.73 square meters)    Stage 2 Mild CKD (GFR = 60-89 mL/min/1.73 square meters)    Stage 3A Moderate CKD (GFR = 45-59 mL/min/1.73 square meters)    Stage 3B Moderate CKD (GFR = 30-44 mL/min/1.73 square meters)    Stage 4 Severe CKD (GFR = 15-29 mL/min/1.73 square meters)    Stage 5 End Stage CKD (GFR <15 mL/min/1.73 square meters)  Note: GFR calculation is accurate only with a steady state creatinine    CBC and differential [[id number]] Collected: 05/27/24 1446    Lab Status: Final result Specimen: Blood from Arm, Right Updated: 05/27/24 1501     WBC 8.41 Thousand/uL      RBC 4.20 Million/uL      Hemoglobin 11.8 g/dL      Hematocrit 36.8 %      MCV 88 fL      MCH 28.1 pg      MCHC 32.1 g/dL      RDW 14.6 %      MPV 9.4 fL      Platelets 359 Thousands/uL      nRBC 0 /100 WBCs      Segmented % 62 %      Immature Grans % 0 %      Lymphocytes % 29 %      Monocytes % 6 %      Eosinophils Relative 2 %      Basophils Relative 1 %      Absolute Neutrophils 5.22 Thousands/µL      Absolute Immature Grans 0.03 Thousand/uL      Absolute Lymphocytes 2.42 Thousands/µL      Absolute Monocytes 0.54 Thousand/µL       Eosinophils Absolute 0.16 Thousand/µL      Basophils Absolute 0.04 Thousands/µL     Urine Microscopic [[id number]]  (Abnormal) Collected: 05/27/24 1444    Lab Status: Final result Specimen: Urine Updated: 05/27/24 1457     RBC, UA 1-2 /hpf      WBC, UA 1-2 /hpf      Epithelial Cells Moderate /hpf      Bacteria, UA Occasional /hpf      MUCUS THREADS Moderate    POCT pregnancy, urine [[id number]]  (Normal) Resulted: 05/27/24 1449    Lab Status: Final result Updated: 05/27/24 1449     EXT Preg Test, Ur Negative     Control Valid    Urine Macroscopic, POC [[id number]]  (Abnormal) Collected: 05/27/24 1444    Lab Status: Final result Specimen: Urine Updated: 05/27/24 1445     Color, UA Yellow     Clarity, UA Clear     pH, UA 7.0     Leukocytes, UA Negative     Nitrite, UA Negative     Protein, UA Negative mg/dl      Glucose, UA Negative mg/dl      Ketones, UA Negative mg/dl      Urobilinogen, UA 1.0 E.U./dl      Bilirubin, UA Negative     Occult Blood, UA Trace     Specific Gravity, UA 1.020    Narrative:      CLINITEK RESULT                   CT soft tissue neck with contrast   Final Result by Lincoln Clark MD (05/27 1617)      1. Streak artifact from dental restorations degrades evaluation of the adjacent soft tissues. Within these confines, no convincing organized collection. Mildly asymmetric soft tissue stranding overlying the right mandible may reflect a mild cellulitis.      2. Apparent deformity of the distal left humerus on the frontal topogram. Recommend dedicated left humerus radiographs for further evaluation.      The study was marked in EPIC for immediate notification.      Workstation performed: [id number]         XR humerus LEFT    (Results Pending)              Procedures  Procedures         ED Course                               SBIRT 22yo+      Flowsheet Row Most Recent Value   Initial Alcohol Screen: US AUDIT-C     1. How often do you have a drink containing alcohol? 1 Filed at: 05/27/2024 8878   2.  How many drinks containing alcohol do you have on a typical day you are drinking?  0 Filed at: 05/27/2024 1739   3a. Male UNDER 65: How often do you have five or more drinks on one occasion? 0 Filed at: 05/27/2024 1739   3b. FEMALE Any Age, or MALE 65+: How often do you have 4 or more drinks on one occassion? 0 Filed at: 05/27/2024 1739   Audit-C Score 1 Filed at: 05/27/2024 1739   MARIALUISA: How many times in the past year have you...    Used an illegal drug or used a prescription medication for non-medical reasons? Never Filed at: 05/27/2024 1739                      Medical Decision Making  DDx including but not limited to: dental angella, dental infection, dental abscess, dry socket, gingivitis; doubt kirk's angina.    Plan- will check labs and CT soft tissue neck. Will give IVF, zofran, and morphine (patient has ride home)   Labs- unremarkable.   CT soft tissue neck- 1. Streak artifact from dental restorations degrades evaluation of the adjacent soft tissues. Within these confines, no convincing organized collection. Mildly asymmetric soft tissue stranding overlying the right mandible may reflect a mild cellulitis.  2. Apparent deformity of the distal left humerus on the frontal topogram. Recommend dedicated left humerus radiographs for further evaluation.       Discussed all results with patient. Patient states feeling improvement after medications here. Patient denies any known injury/trauma to humerus or prior deformity. No pain or deformity on exam. Will check left humerus xray.   Xray reviewed by me and interpreted as no acute bony abnormality. Discussed and reviewed xray with patient.     Discussed dental caries and infection and facial/mandibular cellulitis. Will treat with augmentin. Will give short course of tramadol. Patient has taken and tolerated in the past. Explained can cause drowsiness and not to drive/work while taking. Follow up closely with dentist/[facility]. Referral placed.   The management plan was  discussed in detail with the patient at bedside and all questions were answered.  Prior to discharge, we provided both verbal and written instructions.  We discussed with the patient the signs and symptoms for which to return to the emergency department.  All questions were answered and patient was comfortable with the plan of care and discharged to home.  Instructed the patient to follow up with the primary care provider and/or specialist provided and their written instructions.  The patient verbalized understanding of our discussion and plan of care, and agrees to return to the Emergency Department for concerns and progression of illness.     At discharge, I instructed the patient to:  -follow up with pcp  -follow up with the recommended specialists- dentist and [facility]   -return to the ER if symptoms worsened or new symptoms arose  Patient agreed to this plan and was stable at time of discharge.     Amount and/or Complexity of Data Reviewed  Labs: ordered. Decision-making details documented in ED Course.  Radiology: ordered and independent interpretation performed. Decision-making details documented in ED Course.    Risk  Prescription drug management.             Disposition  Final diagnoses:   Dental caries   Pain, dental   Facial cellulitis     Time reflects when diagnosis was documented in both MDM as applicable and the Disposition within this note       Time User Action Codes Description Comment    5/27/2024  5:12 PM Hodan Freeman Add [K02.9] Dental caries     5/27/2024  5:12 PM Hodan Freeman Add [K08.89] Pain, dental     5/27/2024  5:12 PM Hodan Freeman Add [L03.211] Facial cellulitis           ED Disposition       ED Disposition   Discharge    Condition   Stable    Date/Time   Mon May 27, 2024 1712    Comment   Renee Wall discharge to home/self care.                   Follow-up Information       Follow up With Specialties Details Why Contact Info Additional Information    Cb Barclay DO Family Medicine  Schedule an appointment as soon as possible for a visit   [address] [facility] [address]  [phone]       Follow up with your dentist in 1 day         [facility] Emergency Department Emergency Medicine  If symptoms worsen [address] [facility] [address] [phone] [facility] Emergency Department, [address] [place], [place], [address]    [facility] [place]  Schedule an appointment as soon as possible for a visit in 1 day  [facility] [address]  [phone]             Discharge Medication List as of 5/27/2024  5:28 PM        START taking these medications    Details   amoxicillin-clavulanate (AUGMENTIN) 875-125 mg per tablet Take 1 tablet by mouth every 12 (twelve) hours for 7 days, Starting Mon 5/27/2024, Until Mon 6/3/2024, Normal      traMADol (Ultram) 50 mg tablet Take 1 tablet (50 mg total) by mouth every 8 (eight) hours as needed for moderate pain, Starting Mon 5/27/2024, Normal           CONTINUE these medications which have NOT CHANGED    Details   albuterol (2.5 mg/3 mL) 0.083 % nebulizer solution Take 3 mL (2.5 mg total) by nebulization every 6 (six) hours as needed for wheezing or shortness of breath, Starting Thu 10/26/2023, Normal      budesonide-formoterol (SYMBICORT) 160-4.5 mcg/act inhaler Inhale 2 puffs 2 (two) times a day Rinse mouth after use., Starting Tue 5/21/2024, Normal      celecoxib (CeleBREX) 200 mg capsule Take 1 capsule (200 mg total) by mouth 2 (two) times a day, Starting Wed 1/10/2024, Normal      cyclobenzaprine (FLEXERIL) 5 mg tablet Take 1 tablet (5 mg total) by mouth daily at bedtime, Starting Wed 1/10/2024, Normal      Diclofenac Sodium (VOLTAREN) 1 % Apply 2 g topically 4 (four) times a day, Starting Wed 1/10/2024, Normal      ergocalciferol (VITAMIN D2) 50,000 units Take 1 capsule (50,000 Units total) by mouth once a week, Starting  Sat 1/13/2024, Normal      famotidine (PEPCID) 20 mg tablet Take 1 tablet (20 mg total) by mouth 2 (two) times a day, Starting Fri 3/8/2024, Normal      fexofenadine-pseudoephedrine (Allegra-D Allergy & Congestion) 180-240 MG per 24 hr tablet Take 1 tablet by mouth daily, Starting Tue 5/21/2024, Normal      levalbuterol (Xopenex HFA) 45 mcg/act inhaler Inhale 1-2 puffs every 4 (four) hours as needed for wheezing or shortness of breath, Starting Wed 5/22/2024, Normal      omeprazole (PriLOSEC) 40 MG capsule Take 1 capsule (40 mg total) by mouth daily, Starting Thu 10/26/2023, Normal      Insulin Pen Needle 32G X 4 MM MISC Use in the morning, Starting Tue 1/16/2024, Normal      sulfaSALAzine (AZULFIDINE) 500 mg tablet Take 1 tab daily for a week, then 1 tab twice a day for a week, then 2 tabs twice a day, Normal                 PDMP Review         Value Time User    PDMP Reviewed  Yes 8/3/2021  8:03 AM Cb Barclay DO            ED Provider  Electronically Signed by             Hodan Freeman PA-C  05/27/24 1917

## 2024-05-30 NOTE — TELEPHONE ENCOUNTER
Preferred drugs through insurance are fluticasone-salmeterol ([id number], [id number]), wixela inhub, BREO.     Please advise.  (Previously routed to wrong clinical pool)

## 2024-05-30 NOTE — TELEPHONE ENCOUNTER
PA for SYMBICORT Denied    Reason        Message sent to office clinical pool Yes    Denial letter scanned into Media Yes    Appeal started No ( Provider will need to decide if appeal is warranted and send clinical documentation to PA team for initiation.)    **Please follow up with your patient regarding denial and next steps**

## 2024-05-31 RX ORDER — FLUTICASONE FUROATE AND VILANTEROL 100; 25 UG/1; UG/1
1 POWDER RESPIRATORY (INHALATION) DAILY
Qty: 60 BLISTER | Refills: 5 | Status: SHIPPED | OUTPATIENT
Start: 2024-05-31 | End: 2024-11-27

## 2024-06-18 ENCOUNTER — OFFICE VISIT (OUTPATIENT)
Dept: BARIATRICS | Facility: CLINIC | Age: 39
End: 2024-06-18
Payer: COMMERCIAL

## 2024-06-18 VITALS
SYSTOLIC BLOOD PRESSURE: 100 MMHG | DIASTOLIC BLOOD PRESSURE: 75 MMHG | HEART RATE: 86 BPM | TEMPERATURE: 98.1 F | HEIGHT: 61 IN | BODY MASS INDEX: 41.91 KG/M2 | RESPIRATION RATE: 17 BRPM | WEIGHT: 222 LBS

## 2024-06-18 DIAGNOSIS — E66.01 OBESITY, CLASS III, BMI 40-49.9 (MORBID OBESITY) (HCC): Primary | ICD-10-CM

## 2024-06-18 DIAGNOSIS — R73.03 PREDIABETES: ICD-10-CM

## 2024-06-18 DIAGNOSIS — K21.9 GASTROESOPHAGEAL REFLUX DISEASE WITHOUT ESOPHAGITIS: ICD-10-CM

## 2024-06-18 PROCEDURE — 99214 OFFICE O/P EST MOD 30 MIN: CPT | Performed by: NURSE PRACTITIONER

## 2024-06-18 NOTE — PATIENT INSTRUCTIONS
I am sending the Wegovy to your pharmacy. This will start the prior authorization process. My office takes care of that. It can take up to 3 weeks to process.     Start Wegovy 0.25 mg subcutaneously once a week for 4 weeks, then increase to 0.5 mg subcutaneously each week. A few days after you take the first pen of the starting dose of 0.25 mg, please message me with an update regarding how you are feeling. As long as you are tolerating it well, I will submit the next dose of 0.5 mg to the pharmacy, as we will also likely need to do another prior authorization for that dose.

## 2024-06-18 NOTE — ASSESSMENT & PLAN NOTE
- Taking prilosec and pepcid. May improve with weight loss and lifestyle modification. Continue management with prescribing provider.

## 2024-06-18 NOTE — PROGRESS NOTES
Assessment/Plan:     Obesity, Class III, BMI 40-49.9 (morbid obesity) (HCC)  - Patient is pursuing Conservative Program.  - Not currently interested in weight loss surgery.  - Initial weight loss goal of 5-10% weight loss for improved health  - On phentermine in the past and caused chest pain and palpitations.   - Contrave in the past caused headaches.   - Patient denies personal history of pancreatitis. Patient also denies personal and family history of thyroid cancer and multiple endocrine neoplasia type 2 (MEN 2 tumor).   - Denies history of kidney stones, gallstones, seizures, or glaucoma.   - Advised to use contraception while on weight loss medication, as pregnancy not recommended while taking weight loss medication. Reviewed with her that as she loses weight, fertility can increase. If she wants to conceive in the future, advised she should be off of all weight loss medications for at least 3 months prior to trying to conceive.    - Medication contract signed Jan 2024.  - Struggling with appetite and cravings and is interested in starting Wegovy to assist with weight loss.   - Start Wegovy.  - Side effects of Wegovy: nausea, vomiting, diarrhea, and constipation. If severe abdominal pain develops, stop Wegovy and go to the ER, as this could be pancreatitis. Monitor heart rate while on Wegovy and if resting heart rate greater than 100 beats per minute, patient will notify me.   - If you need to have surgery or another procedure, such as an upper endoscopy or colonoscopy, please contact my office as often medications like Wegovy need to be held for a certain amount of time prior to a procedure.    - Labs reviewed: CMP and CBC 5/27/2024. TSH and lipase 3/18/2024. Fasting insulin, A1C, and lipid 1/12/2024. A1C in prediabetes range at 6.0 and HDL low, which may improve with weight loss and lifestyle changes. Remainder of the blood work within acceptable range.    Initial: 225 lbs   Current: 222 lbs BMI  42.64  Change: -3 lbs  Goal: 165 lbs    Goals:  Start food logging, weighing and measuring food.   1300 calories per day.   Increase water intake to at least 64 oz daily.   Reduce and ideally eliminate sugary beverages such as juice.   Increase exercise as tolerated - currently in PT for ankle pain.  Start Wegovy.    Prediabetes  Will likely improve with weight loss and Wegovy.    Gastroesophageal reflux disease without esophagitis  - Taking prilosec and pepcid. May improve with weight loss and lifestyle modification. Continue management with prescribing provider.          eRnee was seen today for follow-up.    Diagnoses and all orders for this visit:    Obesity, Class III, BMI 40-49.9 (morbid obesity) ([facility])  -     Semaglutide-Weight Management (WEGOVY) 0.25 MG/0.5ML; Inject 0.5 mL (0.25 mg total) under the skin once a week    Prediabetes  -     Semaglutide-Weight Management (WEGOVY) 0.25 MG/0.5ML; Inject 0.5 mL (0.25 mg total) under the skin once a week    Gastroesophageal reflux disease without esophagitis          Follow up in approximately  every 2 month nurse visits and 6 months  with Non-Surgical Physician/Advanced Practitioner.    Subjective:   Chief Complaint   Patient presents with    Follow-up     MWM-4M f/u;Waist-44in       Patient ID: Renee Wall  is a 38 y.o. female with excess weight/obesity here to pursue weight management.  Patient is pursuing Conservative Program.   Most recent notes and records were reviewed.    HPI    Wt Readings from Last 10 Encounters:   06/18/24 101 kg (222 lb)   05/27/24 102 kg (224 lb 10.4 oz)   05/21/24 100 kg (221 lb)   04/30/24 101 kg (223 lb 11.2 oz)   04/11/24 102 kg (224 lb)   03/07/24 98.9 kg (218 lb)   02/08/24 102 kg (225 lb)   01/12/24 102 kg (225 lb)   01/10/24 102 kg (225 lb)   01/10/24 99.8 kg (220 lb)     Zepbound ordered last office visit, but was too costly and never started. Saxenda ordered instead, but was also costly and not started. Insurance has since  changed and she is interested in starting Wegovy.       Was on Phentermine about 2 years ago, but had chest pain and palpitations.     Tried Contrave - had headaches from it.       Appetite increased. Cravings for sugar.       Hydration: 4-5 bottle of water, 4 cups juice, occ soda  Alcohol: rare  Smoking: denies  Exercise: in PT for ankle pain, working on walking more  Occupation: works for [employer]  Sleep: 4-6 hours     Colonoscopy: N/A  Mammogram: N/A      The following portions of the patient's history were reviewed and updated as appropriate: allergies, current medications, past family history, past medical history, past social history, past surgical history, and problem list.    Family History   Problem Relation Age of Onset    Heart disease Mother     Thyroid disease Mother     Arthritis Mother     Diabetes Mother     Heart attack Mother     Depression Father     Arthritis Father     No Known Problems Sister     No Known Problems Brother     No Known Problems Daughter     No Known Problems Son     Ovarian cancer Maternal Aunt         50s    Diabetes Maternal Aunt     Colon cancer Maternal Aunt     Diabetes Maternal Aunt     Diabetes Maternal Uncle     Diabetes Maternal Uncle     Sickle cell anemia Paternal Aunt     Sickle cell anemia Paternal Aunt     No Known Problems Paternal Aunt     No Known Problems Paternal Aunt     No Known Problems Paternal Aunt     No Known Problems Paternal Aunt     No Known Problems Paternal Uncle     No Known Problems Paternal Uncle     Sickle cell anemia Paternal Uncle     Coronary artery disease Maternal Grandmother     Hyperlipidemia Maternal Grandmother     Thyroid disease Maternal Grandmother     Asthma Maternal Grandmother     Arthritis Maternal Grandmother     Vision loss Maternal Grandmother     Breast cancer Maternal Grandmother         age unknown    Diabetes Maternal Grandmother     Hyperlipidemia Maternal Grandfather     Thyroid disease Maternal Grandfather      "Diabetes Maternal Grandfather     No Known Problems Paternal Grandmother     No Known Problems Paternal Grandfather         Review of Systems   HENT:  Negative for sore throat.    Respiratory:  Positive for shortness of breath (with exertion in heat). Negative for cough.    Cardiovascular:  Negative for chest pain and palpitations.   Gastrointestinal:  Negative for abdominal pain, constipation, diarrhea, nausea and vomiting.        Denies GERD   Musculoskeletal:  Positive for arthralgias (chronic). Negative for back pain.   Skin:  Negative for rash.   Psychiatric/Behavioral:  Negative for suicidal ideas (or HI).         Denies depression   + anxiety will be contacting primary care.        Objective:  /75   Pulse 86   Temp 98.1 °F (36.7 °C)   Resp 17   Ht 5' 0.5\" (1.537 m)   Wt 101 kg (222 lb)   LMP 05/09/2024 (Approximate)   BMI 42.64 kg/m²     Physical Exam  Vitals and nursing note reviewed.        Constitutional   General appearance: Abnormal.  well developed and morbidly obese.   Eyes No conjunctival injection.   Ears, Nose, Mouth, and Throat Oral mucosa moist.   Pulmonary   Respiratory effort: No increased work of breathing or signs of respiratory distress.    Cardiovascular    Examination of extremities for edema and/or varicosities: Normal.  no edema.   Abdomen   Abdomen: Abnormal.  The abdomen was obese.    Musculoskeletal   Normal range of motion  Neurological   Gait and station: Normal.   Psychiatric   Orientation to person, place and time: Normal.    Affect: appropriate     "

## 2024-06-18 NOTE — ASSESSMENT & PLAN NOTE
- Patient is pursuing Conservative Program.  - Not currently interested in weight loss surgery.  - Initial weight loss goal of 5-10% weight loss for improved health  - On phentermine in the past and caused chest pain and palpitations.   - Contrave in the past caused headaches.   - Patient denies personal history of pancreatitis. Patient also denies personal and family history of thyroid cancer and multiple endocrine neoplasia type 2 (MEN 2 tumor).   - Denies history of kidney stones, gallstones, seizures, or glaucoma.   - Advised to use contraception while on weight loss medication, as pregnancy not recommended while taking weight loss medication. Reviewed with her that as she loses weight, fertility can increase. If she wants to conceive in the future, advised she should be off of all weight loss medications for at least 3 months prior to trying to conceive.    - Medication contract signed Jan 2024.  - Struggling with appetite and cravings and is interested in starting Wegovy to assist with weight loss.   - Start Wegovy.  - Side effects of Wegovy: nausea, vomiting, diarrhea, and constipation. If severe abdominal pain develops, stop Wegovy and go to the ER, as this could be pancreatitis. Monitor heart rate while on Wegovy and if resting heart rate greater than 100 beats per minute, patient will notify me.   - If you need to have surgery or another procedure, such as an upper endoscopy or colonoscopy, please contact my office as often medications like Wegovy need to be held for a certain amount of time prior to a procedure.    - Labs reviewed: CMP and CBC 5/27/2024. TSH and lipase 3/18/2024. Fasting insulin, A1C, and lipid 1/12/2024. A1C in prediabetes range at 6.0 and HDL low, which may improve with weight loss and lifestyle changes. Remainder of the blood work within acceptable range.    Initial: 225 lbs   Current: 222 lbs BMI 42.64  Change: -3 lbs  Goal: 165 lbs    Goals:  Start food logging, weighing and  measuring food.   1300 calories per day.   Increase water intake to at least 64 oz daily.   Reduce and ideally eliminate sugary beverages such as juice.   Increase exercise as tolerated - currently in PT for ankle pain.  Start Wegovy.

## 2024-06-19 ENCOUNTER — OFFICE VISIT (OUTPATIENT)
Dept: AUDIOLOGY | Age: 39
End: 2024-06-19
Payer: COMMERCIAL

## 2024-06-19 DIAGNOSIS — H90.6 MIXED CONDUCTIVE AND SENSORINEURAL HEARING LOSS OF BOTH EARS: Primary | ICD-10-CM

## 2024-06-19 DIAGNOSIS — H90.5 SENSORINEURAL HEARING LOSS (SNHL), UNSPECIFIED LATERALITY: ICD-10-CM

## 2024-06-19 PROCEDURE — 92557 COMPREHENSIVE HEARING TEST: CPT | Performed by: AUDIOLOGIST

## 2024-06-19 PROCEDURE — 92567 TYMPANOMETRY: CPT | Performed by: AUDIOLOGIST

## 2024-06-19 NOTE — PROGRESS NOTES
Diagnostic Hearing Evaluation    Name:  Renee Wall  :  1985  Age:  38 y.o.   MRN:  [id number]  Date of Evaluation: 24     HISTORY:     Reason for visit: Difficulty Understanding    Renee Wall is being seen today at the request of Dr. Barclay for an initial  evaluation of hearing. Patient reports muffled hearing on the right mainly in the right ear.  Patient denies otalgia, otorrhea, dizziness, fullness, noise exposure, family history of hearing loss, and notes a positive history of tinnitus. Patient reports a history of ear infections and PE tubes as a child, currently has sinus issues due to allergies.    EVALUATION:    Otoscopic Evaluation:   Right Ear: Unremarkable, canal clear   Left Ear: Unremarkable, canal clear    Tympanometry:   Right Ear: Type A; normal middle ear pressure and static compliance    Left Ear: Type A; normal middle ear pressure and static compliance     Speech Audiometry:  Speech Reception (SRT)    Right Ear: 50 dB HL    Left Ear: 35 dB HL    Word Recognition Scores (WRS):  Right Ear: excellent (100 % correct)     Left Ear: excellent (100 % correct)    Stimuli: [id number]    Pure Tone Audiometry:  Conventional pure tone audiometry from 250 - 8000 Hz  was obtained with good reliability and revealed the following:     Right Ear: Moderately severe sloping to severe mixed hearing loss (MHL)    Left Ear: Moderate mixed hearing loss (MHL)     *see attached audiogram    IMPRESSIONS:   There is an asymmetry between ears with the left ear being better than the right ear. Mixed hearing loss bilaterally.    RECOMMENDATIONS:  Consult ENT, Return to [facility]. for F/U, and Copy to Patient/Caregiver    PATIENT EDUCATION:   The results of today's results and recommendations were reviewed with the patient and her hearing thresholds were explained at length. Treatment options, including amplification and communication strategies, were discussed as appropriate. The patient voiced understanding of her test  results. Questions were addressed and the patient was encouraged to contact our department should concerns arise.      Tai Barbosa., CCC-A  Clinical Audiologist  [facility]  [address] [place] [id number]

## 2024-06-20 PROBLEM — Z00.00 WELL ADULT EXAM: Status: RESOLVED | Noted: 2024-05-21 | Resolved: 2024-06-20

## 2024-06-26 ENCOUNTER — APPOINTMENT (EMERGENCY)
Dept: CT IMAGING | Facility: HOSPITAL | Age: 39
End: 2024-06-26
Payer: COMMERCIAL

## 2024-06-26 ENCOUNTER — HOSPITAL ENCOUNTER (EMERGENCY)
Facility: HOSPITAL | Age: 39
Discharge: HOME | End: 2024-06-26
Attending: EMERGENCY MEDICINE | Admitting: EMERGENCY MEDICINE
Payer: COMMERCIAL

## 2024-06-26 VITALS
TEMPERATURE: 98.2 F | DIASTOLIC BLOOD PRESSURE: 82 MMHG | BODY MASS INDEX: 43.36 KG/M2 | OXYGEN SATURATION: 95 % | HEIGHT: 60 IN | RESPIRATION RATE: 18 BRPM | HEART RATE: 95 BPM | SYSTOLIC BLOOD PRESSURE: 116 MMHG

## 2024-06-26 DIAGNOSIS — K04.01 PULPITIS: ICD-10-CM

## 2024-06-26 DIAGNOSIS — K12.2 CELLULITIS OF MOUTH: ICD-10-CM

## 2024-06-26 DIAGNOSIS — K02.9 DENTAL CARIES: Primary | ICD-10-CM

## 2024-06-26 DIAGNOSIS — K08.89 DENTALGIA: ICD-10-CM

## 2024-06-26 LAB
ANION GAP SERPL CALCULATED.3IONS-SCNC: 7 MMOL/L (ref 4–13)
BASOPHILS # BLD AUTO: 0.03 THOUSANDS/ÂΜL (ref 0–0.1)
BASOPHILS NFR BLD AUTO: 0 % (ref 0–1)
BUN SERPL-MCNC: 12 MG/DL (ref 5–25)
CALCIUM SERPL-MCNC: 8.5 MG/DL (ref 8.4–10.2)
CHLORIDE SERPL-SCNC: 106 MMOL/L (ref 96–108)
CO2 SERPL-SCNC: 24 MMOL/L (ref 21–32)
CREAT SERPL-MCNC: 0.57 MG/DL (ref 0.6–1.3)
EOSINOPHIL # BLD AUTO: 0.11 THOUSAND/ÂΜL (ref 0–0.61)
EOSINOPHIL NFR BLD AUTO: 1 % (ref 0–6)
ERYTHROCYTE [DISTWIDTH] IN BLOOD BY AUTOMATED COUNT: 14.5 % (ref 11.6–15.1)
EXT PREGNANCY TEST URINE: NEGATIVE
EXT. CONTROL: NORMAL
GFR SERPL CREATININE-BSD FRML MDRD: 118 ML/MIN/1.73SQ M
GLUCOSE SERPL-MCNC: 103 MG/DL (ref 65–140)
HCT VFR BLD AUTO: 35.2 % (ref 34.8–46.1)
HGB BLD-MCNC: 11.3 G/DL (ref 11.5–15.4)
IMM GRANULOCYTES # BLD AUTO: 0.05 THOUSAND/UL (ref 0–0.2)
IMM GRANULOCYTES NFR BLD AUTO: 1 % (ref 0–2)
LYMPHOCYTES # BLD AUTO: 2.45 THOUSANDS/ÂΜL (ref 0.6–4.47)
LYMPHOCYTES NFR BLD AUTO: 31 % (ref 14–44)
MCH RBC QN AUTO: 27.6 PG (ref 26.8–34.3)
MCHC RBC AUTO-ENTMCNC: 32.1 G/DL (ref 31.4–37.4)
MCV RBC AUTO: 86 FL (ref 82–98)
MONOCYTES # BLD AUTO: 0.54 THOUSAND/ÂΜL (ref 0.17–1.22)
MONOCYTES NFR BLD AUTO: 7 % (ref 4–12)
NEUTROPHILS # BLD AUTO: 4.86 THOUSANDS/ÂΜL (ref 1.85–7.62)
NEUTS SEG NFR BLD AUTO: 60 % (ref 43–75)
NRBC BLD AUTO-RTO: 0 /100 WBCS
PLATELET # BLD AUTO: 318 THOUSANDS/UL (ref 149–390)
PMV BLD AUTO: 9.2 FL (ref 8.9–12.7)
POTASSIUM SERPL-SCNC: 4 MMOL/L (ref 3.5–5.3)
RBC # BLD AUTO: 4.09 MILLION/UL (ref 3.81–5.12)
SODIUM SERPL-SCNC: 137 MMOL/L (ref 135–147)
WBC # BLD AUTO: 8.04 THOUSAND/UL (ref 4.31–10.16)

## 2024-06-26 PROCEDURE — 85025 COMPLETE CBC W/AUTO DIFF WBC: CPT

## 2024-06-26 PROCEDURE — 80048 BASIC METABOLIC PNL TOTAL CA: CPT

## 2024-06-26 PROCEDURE — 70487 CT MAXILLOFACIAL W/DYE: CPT

## 2024-06-26 PROCEDURE — 36415 COLL VENOUS BLD VENIPUNCTURE: CPT

## 2024-06-26 PROCEDURE — 99285 EMERGENCY DEPT VISIT HI MDM: CPT

## 2024-06-26 PROCEDURE — 81025 URINE PREGNANCY TEST: CPT

## 2024-06-26 RX ORDER — KETOROLAC TROMETHAMINE 30 MG/ML
15 INJECTION, SOLUTION INTRAMUSCULAR; INTRAVENOUS ONCE
Status: COMPLETED | OUTPATIENT
Start: 2024-06-26 | End: 2024-06-26

## 2024-06-26 RX ORDER — LIDOCAINE HYDROCHLORIDE 20 MG/ML
15 SOLUTION OROPHARYNGEAL ONCE
Status: COMPLETED | OUTPATIENT
Start: 2024-06-26 | End: 2024-06-26

## 2024-06-26 RX ORDER — ONDANSETRON 2 MG/ML
4 INJECTION INTRAMUSCULAR; INTRAVENOUS ONCE
Status: COMPLETED | OUTPATIENT
Start: 2024-06-26 | End: 2024-06-26

## 2024-06-26 RX ORDER — AMOXICILLIN AND CLAVULANATE POTASSIUM 875; 125 MG/1; MG/1
1 TABLET, FILM COATED ORAL EVERY 12 HOURS
Qty: 14 TABLET | Refills: 0 | Status: SHIPPED | OUTPATIENT
Start: 2024-06-26 | End: 2024-07-03

## 2024-06-26 RX ORDER — AMOXICILLIN AND CLAVULANATE POTASSIUM 875; 125 MG/1; MG/1
1 TABLET, FILM COATED ORAL ONCE
Status: COMPLETED | OUTPATIENT
Start: 2024-06-26 | End: 2024-06-26

## 2024-06-26 RX ORDER — MORPHINE SULFATE 4 MG/ML
4 INJECTION, SOLUTION INTRAMUSCULAR; INTRAVENOUS ONCE
Status: COMPLETED | OUTPATIENT
Start: 2024-06-26 | End: 2024-06-26

## 2024-06-26 RX ADMIN — IOHEXOL 85 ML: 350 INJECTION, SOLUTION INTRAVENOUS at 08:58

## 2024-06-26 RX ADMIN — LIDOCAINE HYDROCHLORIDE 15 ML: 20 SOLUTION ORAL at 07:46

## 2024-06-26 RX ADMIN — SODIUM CHLORIDE 500 ML: 0.9 INJECTION, SOLUTION INTRAVENOUS at 08:01

## 2024-06-26 RX ADMIN — AMOXICILLIN AND CLAVULANATE POTASSIUM 1 TABLET: 875; 125 TABLET, FILM COATED ORAL at 10:59

## 2024-06-26 RX ADMIN — KETOROLAC TROMETHAMINE 15 MG: 30 INJECTION, SOLUTION INTRAMUSCULAR; INTRAVENOUS at 07:46

## 2024-06-26 RX ADMIN — ONDANSETRON 4 MG: 2 INJECTION INTRAMUSCULAR; INTRAVENOUS at 08:01

## 2024-06-26 RX ADMIN — MORPHINE SULFATE 4 MG: 4 INJECTION INTRAVENOUS at 09:07

## 2024-06-26 NOTE — ED PROVIDER NOTES
History  Chief Complaint   Patient presents with    Dental Pain     Pt reports having R-sided dental pain for the past week, has an S appnt in July.      Patient is a 38-year-old female presented ED with a chief complaint of dental pain.  Seen in May for the same.  Patient states that the pain has gotten significantly worse and it feels a lot worse than the last time she was here.  She stated she has noted facial swelling and has had pain along the lower right mandible extending into her head and neck.  Patient denies any drainage from the area.  States that anything cold hurts her teeth especially even breathing in cold air.  Patient rating the pain a 10 out of 10 stating she has been taking Tylenol ibuprofen at home with no relief.  She states over the last couple days it is affected her sleep and she is having trouble sleeping.  Patient stated that she did have a short course of tramadol which also at this point not helping.  Patient denying any known fusion but states that she is woke up sweating and has felt nauseous.  Denies any vomiting.  Is any diarrhea.  Patient stated that she was taking Augmentin from the last time she was seen here but finished a course antibiotics.  She stated that symptoms improved for a little but now they feel as though they have significantly worsened.  Patient states that she does have an appointment at [facility] to have surgery done on July 9 but she presents today because the pain was unbearable over the last 2 days.  Patient denies any trismus, difficulty swallowing, oropharyngeal swelling, difficulty breathing.  She states is actually hard for her to eat now due to the pain to the tooth.  Patient stated that she is also having surgery on one of her left modulators along with the right but the left one is not giving her much pain at this time.Patient denies any chest pain, shortness of breath, vomiting, diarrhea, fevers, loss of consciousness, syncope, urinary and bowel changes,  abdominal pain, visual symptoms, vision loss, loss of function, loss of sensation, decreased oral intake, hemoptysis, hematochezia, hematemesis, melena, confusion.         Prior to Admission Medications   Prescriptions Last Dose Informant Patient Reported? Taking?   Diclofenac Sodium (VOLTAREN) 1 %   No No   Sig: Apply 2 g topically 4 (four) times a day   Fluticasone Furoate-Vilanterol 100-25 mcg/actuation inhaler   No No   Sig: Inhale 1 puff daily Rinse mouth after use.   Semaglutide-Weight Management (WEGOVY) 0.25 MG/0.5ML   No No   Sig: Inject 0.5 mL (0.25 mg total) under the skin once a week   albuterol (2.5 mg/3 mL) 0.083 % nebulizer solution  Self No No   Sig: Take 3 mL (2.5 mg total) by nebulization every 6 (six) hours as needed for wheezing or shortness of breath   celecoxib (CeleBREX) 200 mg capsule   No No   Sig: Take 1 capsule (200 mg total) by mouth 2 (two) times a day   cyclobenzaprine (FLEXERIL) 5 mg tablet   No No   Sig: Take 1 tablet (5 mg total) by mouth daily at bedtime   ergocalciferol (VITAMIN D2) 50,000 units   No No   Sig: Take 1 capsule (50,000 Units total) by mouth once a week   famotidine (PEPCID) 20 mg tablet   No No   Sig: Take 1 tablet (20 mg total) by mouth 2 (two) times a day   fexofenadine-pseudoephedrine (Allegra-D Allergy & Congestion) 180-240 MG per 24 hr tablet   No No   Sig: Take 1 tablet by mouth daily   levalbuterol (Xopenex HFA) 45 mcg/act inhaler   No No   Sig: Inhale 1-2 puffs every 4 (four) hours as needed for wheezing or shortness of breath   omeprazole (PriLOSEC) 40 MG capsule  Self No No   Sig: Take 1 capsule (40 mg total) by mouth daily   sulfaSALAzine (AZULFIDINE) 500 mg tablet   No No   Sig: Take 1 tab daily for a week, then 1 tab twice a day for a week, then 2 tabs twice a day   Patient not taking: Reported on 5/27/2024   traMADol (Ultram) 50 mg tablet   No No   Sig: Take 1 tablet (50 mg total) by mouth every 8 (eight) hours as needed for moderate pain       Facility-Administered Medications: None       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     Depression     GERD (gastroesophageal reflux disease)     Obesity     Scoliosis     Visual impairment        Past Surgical History:   Procedure Laterality Date    HERNIA REPAIR         Family History   Problem Relation Age of Onset    Heart disease Mother     Thyroid disease Mother     Arthritis Mother     Diabetes Mother     Heart attack Mother     Depression Father     Arthritis Father     No Known Problems Sister     No Known Problems Brother     No Known Problems Daughter     No Known Problems Son     Ovarian cancer Maternal Aunt         50s    Diabetes Maternal Aunt     Colon cancer Maternal Aunt     Diabetes Maternal Aunt     Diabetes Maternal Uncle     Diabetes Maternal Uncle     Sickle cell anemia Paternal Aunt     Sickle cell anemia Paternal Aunt     No Known Problems Paternal Aunt     No Known Problems Paternal Aunt     No Known Problems Paternal Aunt     No Known Problems Paternal Aunt     No Known Problems Paternal Uncle     No Known Problems Paternal Uncle     Sickle cell anemia Paternal Uncle     Coronary artery disease Maternal Grandmother     Hyperlipidemia Maternal Grandmother     Thyroid disease Maternal Grandmother     Asthma Maternal Grandmother     Arthritis Maternal Grandmother     Vision loss Maternal Grandmother     Breast cancer Maternal Grandmother         age unknown    Diabetes Maternal Grandmother     Hyperlipidemia Maternal Grandfather     Thyroid disease Maternal Grandfather     Diabetes Maternal Grandfather     No Known Problems Paternal Grandmother     No Known Problems Paternal Grandfather      I have reviewed and agree with the history as documented.    E-Cigarette/Vaping    E-Cigarette Use Never User      E-Cigarette/Vaping Substances    Nicotine No     THC No     CBD No     Flavoring No     Other No     Unknown No       Social History     Tobacco Use    Smoking status: Former     Current packs/day: 0.00     Types: Cigarettes     Quit date:      Years since quittin.4    Smokeless tobacco: Never   Vaping Use    Vaping status: Never Used   Substance Use Topics    Alcohol use: Not Currently     Comment: occasionally    Drug use: Never       Review of Systems   Constitutional:  Positive for diaphoresis. Negative for chills, fatigue and fever.   HENT:  Positive for dental problem and facial swelling. Negative for congestion, ear discharge, ear pain, postnasal drip, rhinorrhea, sinus pressure, sinus pain and sore throat.    Eyes:  Negative for photophobia and visual disturbance.   Respiratory:  Negative for cough, chest tightness and shortness of breath.    Cardiovascular:  Negative for chest pain and palpitations.   Gastrointestinal:  Negative for abdominal distention, abdominal pain, constipation, diarrhea, nausea and vomiting.   Genitourinary:  Negative for difficulty urinating, dysuria, flank pain, frequency and hematuria.   Musculoskeletal:  Negative for arthralgias, back pain, joint swelling, myalgias, neck pain and neck stiffness.   Skin:  Negative for rash and wound.   Neurological:  Positive for numbness. Negative for dizziness, tremors, syncope, facial asymmetry, weakness, light-headedness and headaches.       Physical Exam  Physical Exam  Constitutional:       General: She is not in acute distress.     Appearance: Normal appearance. She is not ill-appearing, toxic-appearing or diaphoretic.   HENT:      Head: Normocephalic.      Right Ear: Tympanic membrane, ear canal and external ear normal.      Left Ear: Tympanic membrane, ear canal and external ear normal.      Nose: Nose normal.      Mouth/Throat:      Mouth: Mucous membranes are moist.      Dentition: Abnormal dentition. Dental tenderness, gingival swelling and dental caries present.      Pharynx: Oropharynx is clear. No oropharyngeal exudate or posterior  oropharyngeal erythema.        Comments: No dental abscess appreciated on exam.  Patient has severe pain to percussion of the lower left molar.  Dental caries noted throughout mouth.  Patient does have periodontal disease.  There is some gingival swelling around the tooth that is giving the patient pain.  No trismus patient handling oral secretions.  No difficulty swallowing.  No PTA exam.  No oropharyngeal swelling no discharge noted exam.  Pain along the lower mandible into the right ear along with anterior cervical neck pain on the right side  Eyes:      General:         Right eye: No discharge.         Left eye: No discharge.      Conjunctiva/sclera: Conjunctivae normal.   Cardiovascular:      Rate and Rhythm: Normal rate and regular rhythm.   Pulmonary:      Effort: Pulmonary effort is normal. No respiratory distress.      Breath sounds: Normal breath sounds. No stridor. No wheezing, rhonchi or rales.   Chest:      Chest wall: No tenderness.   Abdominal:      General: Bowel sounds are normal.      Palpations: Abdomen is soft.      Tenderness: There is no abdominal tenderness. There is no right CVA tenderness or left CVA tenderness.   Musculoskeletal:         General: No tenderness. Normal range of motion.      Cervical back: Neck supple.   Skin:     General: Skin is warm and dry.      Capillary Refill: Capillary refill takes less than 2 seconds.   Neurological:      Mental Status: She is alert and oriented to person, place, and time.   Psychiatric:         Mood and Affect: Mood normal.         Vital Signs  ED Triage Vitals [06/26/24 0716]   Temperature Pulse Respirations Blood Pressure SpO2   98.2 °F (36.8 °C) 79 18 127/79 95 %      Temp Source Heart Rate Source Patient Position - Orthostatic VS BP Location FiO2 (%)   Oral Monitor Sitting Right arm --      Pain Score       10 - Worst Possible Pain           Vitals:    06/26/24 0716 06/26/24 1041   BP: 127/79 116/82   Pulse: 79 95   Patient Position -  Orthostatic VS: Sitting          Visual Acuity      ED Medications  Medications   ketorolac (TORADOL) injection 15 mg (15 mg Intramuscular Given 6/26/24 0746)   Lidocaine Viscous HCl (XYLOCAINE) 2 % mucosal solution 15 mL (15 mL Swish & Spit Given 6/26/24 0746)   sodium chloride 0.9 % bolus 500 mL (0 mL Intravenous Stopped 6/26/24 0907)   ondansetron (ZOFRAN) injection 4 mg (4 mg Intravenous Given 6/26/24 0801)   morphine injection 4 mg (4 mg Intravenous Given 6/26/24 0907)   iohexol (OMNIPAQUE) 350 MG/ML injection (SINGLE-DOSE) 85 mL (85 mL Intravenous Given 6/26/24 0858)   amoxicillin-clavulanate (AUGMENTIN) 875-125 mg per tablet 1 tablet (1 tablet Oral Given 6/26/24 1059)       Diagnostic Studies  Results Reviewed       Procedure Component Value Units Date/Time    POCT pregnancy, urine [[id number]]  (Normal) Resulted: 06/26/24 0834    Lab Status: Final result Updated: 06/26/24 0834     EXT Preg Test, Ur Negative     Control Valid    Basic metabolic panel [[id number]]  (Abnormal) Collected: 06/26/24 0801    Lab Status: Final result Specimen: Blood from Arm, Left Updated: 06/26/24 0829     Sodium 137 mmol/L      Potassium 4.0 mmol/L      Chloride 106 mmol/L      CO2 24 mmol/L      ANION GAP 7 mmol/L      BUN 12 mg/dL      Creatinine 0.57 mg/dL      Glucose 103 mg/dL      Calcium 8.5 mg/dL      eGFR 118 ml/min/1.73sq m     Narrative:      National Kidney Disease Foundation guidelines for Chronic Kidney Disease (CKD):     Stage 1 with normal or high GFR (GFR > 90 mL/min/1.73 square meters)    Stage 2 Mild CKD (GFR = 60-89 mL/min/1.73 square meters)    Stage 3A Moderate CKD (GFR = 45-59 mL/min/1.73 square meters)    Stage 3B Moderate CKD (GFR = 30-44 mL/min/1.73 square meters)    Stage 4 Severe CKD (GFR = 15-29 mL/min/1.73 square meters)    Stage 5 End Stage CKD (GFR <15 mL/min/1.73 square meters)  Note: GFR calculation is accurate only with a steady state creatinine    CBC and differential [[id number]]  (Abnormal)  Collected: 06/26/24 0801    Lab Status: Final result Specimen: Blood from Arm, Left Updated: 06/26/24 0807     WBC 8.04 Thousand/uL      RBC 4.09 Million/uL      Hemoglobin 11.3 g/dL      Hematocrit 35.2 %      MCV 86 fL      MCH 27.6 pg      MCHC 32.1 g/dL      RDW 14.5 %      MPV 9.2 fL      Platelets 318 Thousands/uL      nRBC 0 /100 WBCs      Segmented % 60 %      Immature Grans % 1 %      Lymphocytes % 31 %      Monocytes % 7 %      Eosinophils Relative 1 %      Basophils Relative 0 %      Absolute Neutrophils 4.86 Thousands/µL      Absolute Immature Grans 0.05 Thousand/uL      Absolute Lymphocytes 2.45 Thousands/µL      Absolute Monocytes 0.54 Thousand/µL      Eosinophils Absolute 0.11 Thousand/µL      Basophils Absolute 0.03 Thousands/µL                    CT facial bones with contrast   Final Result by Sesar Jhaveri MD (06/26 1017)      Dental amalgam and root canal therapy with extensive beam-hardening streak artifact limits evaluation of adjacent soft tissues and anterior oral cavity.   - Findings suggestive of acute cellulitis in right perimandibular region. Poor dentition with periodontal disease may be contributory. No soft tissue abscess identified within limitations of beam-hardening streak artifact. Recommend evaluation by    dentist.      The study was marked in EPIC for immediate notification.         Workstation performed: [id number]                    Procedures  Procedures         ED Course                                             Medical Decision Making  38-year-old female presented ED with a chief complaint of dental pain.  Cardiopulmonary exam is benign.  On examination of the mouth patient has tenderness to percussion to the right lower molar.  Patient also having tenderness palpation over the right-sided mandible.  Tenderness extends to the ear along the anterior neck.  No cervical adenopathy noted.  Inside of the patient's mouth seems to be cellulitic changes no abscess  appreciated on exam.  No drainage appreciated on exam.  Patient does have dental caries throughout mouth along with periodontal disease noted.  No trismus noted on exam patient maintaining oral secretions.  Due to the pain increasing from previous visits to the facial bones ordered.  On CT  Impression:       Dental amalgam and root canal therapy with extensive beam-hardening streak artifact limits evaluation of adjacent soft tissues and anterior oral cavity.  - Findings suggestive of acute cellulitis in right perimandibular region. Poor dentition with periodontal disease may be contributory. No soft tissue abscess identified within limitations of beam-hardening streak artifact. Recommend evaluation by  dentist.    Baseline labs showed slight anemia which is at patient's baseline but otherwise benign.  Pulm is consulted stated that they would be able to accommodate her at 815 tomorrow morning.  I advised patient of this and advised with office to go to.  Pain controlled with Toradol, morphine, viscous lidocaine in ED.  Patient given dose of Augmentin in the ED along with a course sent to pharmacy.  Patient did states that she had a few tramadol left from the previous time she was here which I stated that if she does have breakthrough pain over the the rest the night she would be able to take this.  Patient advised to be n.p.o. at midnight.  Strict return to ED protocol was discussed.  Advised patient to call [facility] to make sure she is properly scheduled for tomorrow morning.Patient understood diagnosis and treatment plan and had no further questions.  Patient was discharged in stable condition.  Patient was advised to follow-up with her PCP in 1 to 2 days.  Patient was advised to return to the ED with any worsening symptoms that were explained on discharge including but not limited to chest pain, shortness of breath, irretractable vomiting or diarrhea, vision loss, loss of function, loss of sensation, syncope,  "hemoptysis, hematochezia, hematemesis, melena, decreased oral intake, feeling ill.     Ddx-dental caries, perimandibular cellulitis, pulpitis, dentalgia, periapical abscess, osteomyelitis    Portions of the record may have been created with voice recognition software. Occasional wrong word or \"sound a like\" substitutions may have occurred due to the inherent limitations of voice recognition software. Read the chart carefully and recognize, using context, where substitutions have occurred.              Amount and/or Complexity of Data Reviewed  Labs: ordered.     Details: Details in MDM  Radiology: ordered.     Details: Details in MDM    Risk  OTC drugs.  Prescription drug management.             Disposition  Final diagnoses:   Dental caries   Cellulitis of mouth   Pulpitis   Dentalgia     Time reflects when diagnosis was documented in both MDM as applicable and the Disposition within this note       Time User Action Codes Description Comment    6/26/2024 10:55 AM Raji Cortes [K02.9] Dental caries     6/26/2024 10:56 AM Raji Cortes [K12.2] Cellulitis of mouth     6/26/2024 10:56 AM Raji Cortes [K04.01] Pulpitis     6/26/2024 10:56 AM Raji Cortes [K08.89] Dentalgia           ED Disposition       ED Disposition   Discharge    Condition   Stable    Date/Time   Wed Jun 26, 2024 11:09 AM    Comment   Renee Wall discharge to home/self care.                   Follow-up Information       Follow up With Specialties Details Why Contact Info Additional Information    Ramo Herman DMD Oral Maxillofacial Surgery   [address] [address]  [phone]       Cb Barclay DO Family Medicine   [address] [place] [address]  [phone]       [facility] Emergency Medicine   [address] [place] [id number] [phone] [facility] Emergency Department, [address] [place] [address], " [place], [address]            Discharge Medication List as of 6/26/2024 11:09 AM        START taking these medications    Details   amoxicillin-clavulanate (AUGMENTIN) 875-125 mg per tablet Take 1 tablet by mouth every 12 (twelve) hours for 7 days, Starting Wed 6/26/2024, Until Wed 7/3/2024, Normal           CONTINUE these medications which have NOT CHANGED    Details   albuterol (2.5 mg/3 mL) 0.083 % nebulizer solution Take 3 mL (2.5 mg total) by nebulization every 6 (six) hours as needed for wheezing or shortness of breath, Starting Thu 10/26/2023, Normal      celecoxib (CeleBREX) 200 mg capsule Take 1 capsule (200 mg total) by mouth 2 (two) times a day, Starting Wed 1/10/2024, Normal      cyclobenzaprine (FLEXERIL) 5 mg tablet Take 1 tablet (5 mg total) by mouth daily at bedtime, Starting Wed 1/10/2024, Normal      Diclofenac Sodium (VOLTAREN) 1 % Apply 2 g topically 4 (four) times a day, Starting Wed 1/10/2024, Normal      ergocalciferol (VITAMIN D2) 50,000 units Take 1 capsule (50,000 Units total) by mouth once a week, Starting Sat 1/13/2024, Normal      famotidine (PEPCID) 20 mg tablet Take 1 tablet (20 mg total) by mouth 2 (two) times a day, Starting Fri 3/8/2024, Normal      fexofenadine-pseudoephedrine (Allegra-D Allergy & Congestion) 180-240 MG per 24 hr tablet Take 1 tablet by mouth daily, Starting Tue 5/21/2024, Normal      Fluticasone Furoate-Vilanterol 100-25 mcg/actuation inhaler Inhale 1 puff daily Rinse mouth after use., Starting Fri 5/31/2024, Until Wed 11/27/2024, Normal      levalbuterol (Xopenex HFA) 45 mcg/act inhaler Inhale 1-2 puffs every 4 (four) hours as needed for wheezing or shortness of breath, Starting Wed 5/22/2024, Normal      omeprazole (PriLOSEC) 40 MG capsule Take 1 capsule (40 mg total) by mouth daily, Starting Thu 10/26/2023, Normal      Semaglutide-Weight Management (WEGOVY) 0.25 MG/0.5ML Inject 0.5 mL (0.25 mg total) under the skin once a week, Starting Tue 6/18/2024, Normal       sulfaSALAzine (AZULFIDINE) 500 mg tablet Take 1 tab daily for a week, then 1 tab twice a day for a week, then 2 tabs twice a day, Normal      traMADol (Ultram) 50 mg tablet Take 1 tablet (50 mg total) by mouth every 8 (eight) hours as needed for moderate pain, Starting Mon 5/27/2024, Normal             No discharge procedures on file.    PDMP Review         Value Time User    PDMP Reviewed  Yes 8/3/2021  8:03 AM Cb Barclay DO            ED Provider  Electronically Signed by             Raji Cortes PA-C  06/26/24 1918

## 2024-06-26 NOTE — DISCHARGE INSTRUCTIONS
Patient advised to follow-up PCP for today's ED visit.  After talking with Dr. Batsheva GONZALEZ patient advised to arrive at 8:15 AM tomorrow morning at [facility] [address] patient vies to return to the ED with any worsening symptoms plan on discharge.  Patient advised to take prescribed medications as prescribed.

## 2024-06-26 NOTE — Clinical Note
Renee Wall was seen and treated in our emergency department on 6/26/2024.                Diagnosis: Perimandibular cellulitis    Renee  may return to work on return date.    She may return on this date: 07/01/2024         If you have any questions or concerns, please don't hesitate to call.      Raji Cortes PA-C    ______________________________           _______________          _______________  Hospital Representative                              Date                                Time

## 2024-07-02 ENCOUNTER — TELEPHONE (OUTPATIENT)
Age: 39
End: 2024-07-02

## 2024-07-02 NOTE — TELEPHONE ENCOUNTER
Pt following up on prior auth for her wegovy.  I apologized, the pharmacy did not notify us that PA was required.  I told pt I would send this to our PA dept, but it may take a few days for them to submit to ins, then ins can take up to 7-21 days to respond

## 2024-07-03 NOTE — TELEPHONE ENCOUNTER
PA for Wegovy EXCLUDED from plan       Reason:(Screenshot if applicable)        Message sent to office clinical pool Yes

## 2024-07-03 NOTE — TELEPHONE ENCOUNTER
PA for wegovy    Submitted via    [x]CMM-KEY [id number]  [][employer]-Case ID #    []Faxed to plan   []Other website    []Phone call Case ID #      Office notes sent, clinical questions answered. Awaiting determination    Turnaround time for your insurance to make a decision on your Prior Authorization can take 7-21 business days.

## 2024-07-10 ENCOUNTER — OFFICE VISIT (OUTPATIENT)
Dept: RHEUMATOLOGY | Facility: CLINIC | Age: 39
End: 2024-07-10
Payer: COMMERCIAL

## 2024-07-10 VITALS
DIASTOLIC BLOOD PRESSURE: 74 MMHG | HEIGHT: 60 IN | BODY MASS INDEX: 43.98 KG/M2 | SYSTOLIC BLOOD PRESSURE: 116 MMHG | WEIGHT: 224 LBS

## 2024-07-10 DIAGNOSIS — M79.641 BILATERAL HAND PAIN: ICD-10-CM

## 2024-07-10 DIAGNOSIS — E55.9 VITAMIN D DEFICIENCY: ICD-10-CM

## 2024-07-10 DIAGNOSIS — M79.10 MYALGIA: ICD-10-CM

## 2024-07-10 DIAGNOSIS — M19.90 INFLAMMATORY ARTHRITIS: Primary | ICD-10-CM

## 2024-07-10 DIAGNOSIS — M79.642 BILATERAL HAND PAIN: ICD-10-CM

## 2024-07-10 PROCEDURE — 99214 OFFICE O/P EST MOD 30 MIN: CPT | Performed by: INTERNAL MEDICINE

## 2024-07-10 RX ORDER — GABAPENTIN 100 MG/1
100 CAPSULE ORAL 3 TIMES DAILY
Qty: 90 CAPSULE | Refills: 6 | Status: SHIPPED | OUTPATIENT
Start: 2024-07-10

## 2024-07-10 RX ORDER — ERGOCALCIFEROL 1.25 MG/1
50000 CAPSULE ORAL WEEKLY
Qty: 12 CAPSULE | Refills: 1 | Status: SHIPPED | OUTPATIENT
Start: 2024-07-10

## 2024-07-10 RX ORDER — IBUPROFEN 600 MG/1
TABLET ORAL
COMMUNITY
Start: 2024-06-27

## 2024-07-10 RX ORDER — CELECOXIB 200 MG/1
200 CAPSULE ORAL 2 TIMES DAILY
Qty: 180 CAPSULE | Refills: 1 | Status: SHIPPED | OUTPATIENT
Start: 2024-07-10

## 2024-07-10 RX ORDER — HYDROCODONE BITARTRATE AND ACETAMINOPHEN 5; 325 MG/1; MG/1
1 TABLET ORAL EVERY 4 HOURS PRN
COMMUNITY
Start: 2024-06-27

## 2024-07-10 NOTE — PATIENT INSTRUCTIONS
Do labs when in a flare  Continue celecoxib 200mg twice a day as needed for joint pain, take with food  Can take cyclobenzaprine 5mg at bedtime as needed for muscle pain  Continue weekly Vit. D  Continue diclofenac gel as needed for joint pain  Can take gabapentin 3 times a day as needed during pain flare-up     Return to clinic in 6 months

## 2024-07-10 NOTE — PROGRESS NOTES
RHEUMATOLOGY FOLLOW-UP NOTE    Assessment and Plan:   Renee Wall is a 38 y.o.  female who presents for follow-up of possible inflammatory arthritis. Has pain flare-ups, which may or may not be inflammatory. Has had side effects to hydroxychloroquine and sulfasalazine in the past.    Do labs when in a flare  Continue celecoxib 200mg twice a day as needed for joint pain, take with food  Can take cyclobenzaprine 5mg at bedtime as needed for muscle pain  Continue weekly Vit. D  Continue diclofenac gel as needed for joint pain  Can take gabapentin 3 times a day as needed during pain flare-up     Return to clinic in 6 months  Assessment & Plan  1. Inflammatory arthritis.  Fasting labs, including vitamin D and inflammatory markers, will be ordered. Gabapentin will be prescribed, with instructions to take 1 capsule up to 3 times daily during flare-ups.    Plan:  Diagnoses and all orders for this visit:    Inflammatory arthritis  -     Sedimentation rate, automated  -     C-reactive protein    Bilateral hand pain  -     celecoxib (CeleBREX) 200 mg capsule; Take 1 capsule (200 mg total) by mouth 2 (two) times a day    Myalgia  -     gabapentin (Neurontin) 100 mg capsule; Take 1 capsule (100 mg total) by mouth 3 (three) times a day During a pain flare-up    Vitamin D deficiency  -     ergocalciferol (VITAMIN D2) 50,000 units; Take 1 capsule (50,000 Units total) by mouth once a week  -     Vitamin D 25 hydroxy    Other orders  -     HYDROcodone-acetaminophen (NORCO) 5-325 mg per tablet; Take 1 tablet by mouth every 4 (four) hours as needed (Patient not taking: Reported on 7/10/2024)  -     ibuprofen (MOTRIN) 600 mg tablet; TAKE 1 TABLET EVERY 6 HOURS FOR FIRST 48 HOURS THEN AS NEEDED FOR PAIN        Follow-up plan: RTC in 6 months         Rheumatic Disease Summary      Chief Complaint  No chief complaint on file.      HPI  Renee Wall is a 38 y.o.  female who presents for follow-up.    History of Present Illness  The  patient is a 38-year-old female who presents for follow-up of her possible inflammatory arthritis.    The patient was last evaluated in 01/2024, during which she reported experiencing significant symptoms, including tenderness and pain in both hands. She also reported adverse reactions to hydroxychloroquine and sulfasalazine, both of which resulted in headaches. She discontinued the sulfasalazine after a three-day course. Hydroxychloroquine, which she took for approximately two weeks, exacerbated her pain. Currently, she is managing her pain with turmeric and other supplements, which she reports as beneficial. However, she experiences intermittent stiffness and body aches upon waking, which occur once or twice a month and persist for several days. Despite taking Celebrex twice daily, she experiences flare-ups, which radiate throughout her body. She describes her pain as achy, exacerbated by standing, but subsides with movement. She also reports morning stiffness lasting approximately 30 minutes, particularly in her back and legs. Her sleep is disrupted due to her pain. T    Supplemental Information  She used Voltaren gel because she fractured her foot. She had an avulsion fracture and a high ankle sprain back in 08/2023. She had tooth extractions.    The following portions of the patient's history were reviewed and updated as appropriate: allergies, current medications, past family history, past medical history, past social history, past surgical history and problem list.    Review of Systems:   See HPI    Home Medications:    Current Outpatient Medications:     albuterol (2.5 mg/3 mL) 0.083 % nebulizer solution, Take 3 mL (2.5 mg total) by nebulization every 6 (six) hours as needed for wheezing or shortness of breath, Disp: 75 mL, Rfl: 3    celecoxib (CeleBREX) 200 mg capsule, Take 1 capsule (200 mg total) by mouth 2 (two) times a day, Disp: 180 capsule, Rfl: 1    cyclobenzaprine (FLEXERIL) 5 mg tablet, Take 1  tablet (5 mg total) by mouth daily at bedtime, Disp: 30 tablet, Rfl: 6    Diclofenac Sodium (VOLTAREN) 1 %, Apply 2 g topically 4 (four) times a day, Disp: 100 g, Rfl: 6    ergocalciferol (VITAMIN D2) 50,000 units, Take 1 capsule (50,000 Units total) by mouth once a week, Disp: 12 capsule, Rfl: 1    famotidine (PEPCID) 20 mg tablet, Take 1 tablet (20 mg total) by mouth 2 (two) times a day, Disp: 30 tablet, Rfl: 0    fexofenadine-pseudoephedrine (Allegra-D Allergy & Congestion) 180-240 MG per 24 hr tablet, Take 1 tablet by mouth daily, Disp: 90 tablet, Rfl: 1    Fluticasone Furoate-Vilanterol 100-25 mcg/actuation inhaler, Inhale 1 puff daily Rinse mouth after use., Disp: 60 blister, Rfl: 5    gabapentin (Neurontin) 100 mg capsule, Take 1 capsule (100 mg total) by mouth 3 (three) times a day During a pain flare-up, Disp: 90 capsule, Rfl: 6    ibuprofen (MOTRIN) 600 mg tablet, TAKE 1 TABLET EVERY 6 HOURS FOR FIRST 48 HOURS THEN AS NEEDED FOR PAIN, Disp: , Rfl:     levalbuterol (Xopenex HFA) 45 mcg/act inhaler, Inhale 1-2 puffs every 4 (four) hours as needed for wheezing or shortness of breath, Disp: 15 g, Rfl: 0    omeprazole (PriLOSEC) 40 MG capsule, Take 1 capsule (40 mg total) by mouth daily, Disp: 90 capsule, Rfl: 1    Semaglutide-Weight Management (WEGOVY) 0.25 MG/0.5ML, Inject 0.5 mL (0.25 mg total) under the skin once a week, Disp: 2 mL, Rfl: 0    HYDROcodone-acetaminophen (NORCO) 5-325 mg per tablet, Take 1 tablet by mouth every 4 (four) hours as needed (Patient not taking: Reported on 7/10/2024), Disp: , Rfl:     traMADol (Ultram) 50 mg tablet, Take 1 tablet (50 mg total) by mouth every 8 (eight) hours as needed for moderate pain (Patient not taking: Reported on 7/10/2024), Disp: 10 tablet, Rfl: 0    Objective:    Vitals:    07/10/24 1121   BP: 116/74   Weight: 102 kg (224 lb)   Height: 5' (1.524 m)       Physical Exam  Constitutional:       General: She is not in acute distress.  HENT:      Head:  Normocephalic and atraumatic.   Eyes:      Conjunctiva/sclera: Conjunctivae normal.   Cardiovascular:      Rate and Rhythm: Normal rate and regular rhythm.      Heart sounds: S1 normal and S2 normal.      No friction rub.   Pulmonary:      Effort: Pulmonary effort is normal. No respiratory distress.      Breath sounds: Normal breath sounds. No wheezing, rhonchi or rales.   Musculoskeletal:         General: Tenderness present.      Cervical back: Neck supple.   Skin:     Coloration: Skin is not pale.   Neurological:      Mental Status: She is alert. Mental status is at baseline.   Psychiatric:         Mood and Affect: Mood normal.         Behavior: Behavior normal.       Physical Exam      Reviewed labs and imaging.    Imaging:   CT facial bones with contrast    Result Date: 6/26/2024  Narrative: CT FACIAL SOFT TISSUES WITH INTRAVENOUS CONTRAST INDICATION:   pulpitis worsening celulitic changes. COMPARISON: CT soft tissue neck with contrast 5/27/2024.. TECHNIQUE:  Axial images through the facial soft tissues were performed.  Multiplanar 2D reformatted images were created from the source data. Radiation dose length product (DLP) for this visit:  385 mGy-cm .  This examination, like all CT scans performed in the [facility], was performed utilizing techniques to minimize radiation dose exposure, including the use of iterative reconstruction and automated exposure control. IV Contrast:  85 mL of iohexol (OMNIPAQUE) IMAGE QUALITY:  Diagnostic. FINDINGS: SOFT TISSUES: Dental amalgam and root canal therapy with associated extensive beam-hardening streak artifact limits evaluation of adjacent soft tissues and anterior oral cavity. Slight asymmetric subcutaneous soft tissue swelling in right perimandibular region. No soft tissue abscess identified within limitations of beam-hardening streak artifact. DENTITION: Dental amalgam and root canal therapy with associated extensive beam-hardening streak artifact  limits evaluation of adjacent soft tissues. Multiple missing teeth, scattered dental caries and peripheral lucencies suggestive of poor dentition with periodontal disease. FACIAL BONES: There is no facial bone fracture identified.  No discrete lytic or blastic lesion.  No destructive lesions. ORBITS: Normal globes.  Preseptal and retrobulbar soft tissues are unremarkable. SINUSES: Minimal mucosal thickening in right sphenoid sinus.     Impression: Dental amalgam and root canal therapy with extensive beam-hardening streak artifact limits evaluation of adjacent soft tissues and anterior oral cavity. - Findings suggestive of acute cellulitis in right perimandibular region. Poor dentition with periodontal disease may be contributory. No soft tissue abscess identified within limitations of beam-hardening streak artifact. Recommend evaluation by dentist. The study was marked in EPIC for immediate notification. Workstation performed: [id number]       Labs:   Admission on 06/26/2024, Discharged on 06/26/2024   Component Date Value Ref Range Status    EXT Preg Test, Ur 06/26/2024 Negative   Final    Control 06/26/2024 Valid   Final    WBC 06/26/2024 8.04  4.31 - 10.16 Thousand/uL Final    RBC 06/26/2024 4.09  3.81 - 5.12 Million/uL Final    Hemoglobin 06/26/2024 11.3 (L)  11.5 - 15.4 g/dL Final    Hematocrit 06/26/2024 35.2  34.8 - 46.1 % Final    MCV 06/26/2024 86  82 - 98 fL Final    MCH 06/26/2024 27.6  26.8 - 34.3 pg Final    MCHC 06/26/2024 32.1  31.4 - 37.4 g/dL Final    RDW 06/26/2024 14.5  11.6 - 15.1 % Final    MPV 06/26/2024 9.2  8.9 - 12.7 fL Final    Platelets 06/26/2024 318  149 - 390 Thousands/uL Final    nRBC 06/26/2024 0  /100 WBCs Final    Segmented % 06/26/2024 60  43 - 75 % Final    Immature Grans % 06/26/2024 1  0 - 2 % Final    Lymphocytes % 06/26/2024 31  14 - 44 % Final    Monocytes % 06/26/2024 7  4 - 12 % Final    Eosinophils Relative 06/26/2024 1  0 - 6 % Final    Basophils Relative 06/26/2024 0  0 -  1 % Final    Absolute Neutrophils 06/26/2024 4.86  1.85 - 7.62 Thousands/µL Final    Absolute Immature Grans 06/26/2024 0.05  0.00 - 0.20 Thousand/uL Final    Absolute Lymphocytes 06/26/2024 2.45  0.60 - 4.47 Thousands/µL Final    Absolute Monocytes 06/26/2024 0.54  0.17 - 1.22 Thousand/µL Final    Eosinophils Absolute 06/26/2024 0.11  0.00 - 0.61 Thousand/µL Final    Basophils Absolute 06/26/2024 0.03  0.00 - 0.10 Thousands/µL Final    Sodium 06/26/2024 137  135 - 147 mmol/L Final    Potassium 06/26/2024 4.0  3.5 - 5.3 mmol/L Final    Chloride 06/26/2024 106  96 - 108 mmol/L Final    CO2 06/26/2024 24  21 - 32 mmol/L Final    ANION GAP 06/26/2024 7  4 - 13 mmol/L Final    BUN 06/26/2024 12  5 - 25 mg/dL Final    Creatinine 06/26/2024 0.57 (L)  0.60 - 1.30 mg/dL Final    Standardized to IDMS reference method    Glucose 06/26/2024 103  65 - 140 mg/dL Final    If the patient is fasting, the ADA then defines impaired fasting glucose as > 100 mg/dL and diabetes as > or equal to 123 mg/dL.    Calcium 06/26/2024 8.5  8.4 - 10.2 mg/dL Final    eGFR 06/26/2024 118  ml/min/1.73sq m Final   Admission on 05/27/2024, Discharged on 05/27/2024   Component Date Value Ref Range Status    WBC 05/27/2024 8.41  4.31 - 10.16 Thousand/uL Final    RBC 05/27/2024 4.20  3.81 - 5.12 Million/uL Final    Hemoglobin 05/27/2024 11.8  11.5 - 15.4 g/dL Final    Hematocrit 05/27/2024 36.8  34.8 - 46.1 % Final    MCV 05/27/2024 88  82 - 98 fL Final    MCH 05/27/2024 28.1  26.8 - 34.3 pg Final    MCHC 05/27/2024 32.1  31.4 - 37.4 g/dL Final    RDW 05/27/2024 14.6  11.6 - 15.1 % Final    MPV 05/27/2024 9.4  8.9 - 12.7 fL Final    Platelets 05/27/2024 359  149 - 390 Thousands/uL Final    nRBC 05/27/2024 0  /100 WBCs Final    Segmented % 05/27/2024 62  43 - 75 % Final    Immature Grans % 05/27/2024 0  0 - 2 % Final    Lymphocytes % 05/27/2024 29  14 - 44 % Final    Monocytes % 05/27/2024 6  4 - 12 % Final    Eosinophils Relative 05/27/2024 2  0 - 6 %  Final    Basophils Relative 05/27/2024 1  0 - 1 % Final    Absolute Neutrophils 05/27/2024 5.22  1.85 - 7.62 Thousands/µL Final    Absolute Immature Grans 05/27/2024 0.03  0.00 - 0.20 Thousand/uL Final    Absolute Lymphocytes 05/27/2024 2.42  0.60 - 4.47 Thousands/µL Final    Absolute Monocytes 05/27/2024 0.54  0.17 - 1.22 Thousand/µL Final    Eosinophils Absolute 05/27/2024 0.16  0.00 - 0.61 Thousand/µL Final    Basophils Absolute 05/27/2024 0.04  0.00 - 0.10 Thousands/µL Final    Sodium 05/27/2024 138  135 - 147 mmol/L Final    Potassium 05/27/2024 3.9  3.5 - 5.3 mmol/L Final    Chloride 05/27/2024 107  96 - 108 mmol/L Final    CO2 05/27/2024 24  21 - 32 mmol/L Final    ANION GAP 05/27/2024 7  4 - 13 mmol/L Final    BUN 05/27/2024 7  5 - 25 mg/dL Final    Creatinine 05/27/2024 0.57 (L)  0.60 - 1.30 mg/dL Final    Standardized to IDMS reference method    Glucose 05/27/2024 94  65 - 140 mg/dL Final    If the patient is fasting, the ADA then defines impaired fasting glucose as > 100 mg/dL and diabetes as > or equal to 123 mg/dL.    Calcium 05/27/2024 8.6  8.4 - 10.2 mg/dL Final    AST 05/27/2024 11 (L)  13 - 39 U/L Final    ALT 05/27/2024 12  7 - 52 U/L Final    Specimen collection should occur prior to Sulfasalazine administration due to the potential for falsely depressed results.     Alkaline Phosphatase 05/27/2024 70  34 - 104 U/L Final    Total Protein 05/27/2024 6.9  6.4 - 8.4 g/dL Final    Albumin 05/27/2024 3.9  3.5 - 5.0 g/dL Final    Total Bilirubin 05/27/2024 0.36  0.20 - 1.00 mg/dL Final    Use of this assay is not recommended for patients undergoing treatment with eltrombopag due to the potential for falsely elevated results.  N-acetyl-p-benzoquinone imine (metabolite of Acetaminophen) will generate erroneously low results in samples for patients that have taken an overdose of Acetaminophen.    eGFR 05/27/2024 118  ml/min/1.73sq m Final    EXT Preg Test, Ur 05/27/2024 Negative   Final    Control  05/27/2024 Valid   Final    Color, UA 05/27/2024 Yellow   Final    Clarity, UA 05/27/2024 Clear   Final    pH, UA 05/27/2024 7.0  4.5 - 8.0 Final    Leukocytes, UA 05/27/2024 Negative  Negative Final    Nitrite, UA 05/27/2024 Negative  Negative Final    Protein, UA 05/27/2024 Negative  Negative mg/dl Final    Glucose, UA 05/27/2024 Negative  Negative mg/dl Final    Ketones, UA 05/27/2024 Negative  Negative mg/dl Final    Urobilinogen, UA 05/27/2024 1.0  0.2, 1.0 E.U./dl E.U./dl Final    Bilirubin, UA 05/27/2024 Negative  Negative Final    Occult Blood, UA 05/27/2024 Trace (A)  Negative Final    Specific Gravity, UA 05/27/2024 1.020  1.003 - 1.030 Final    RBC, UA 05/27/2024 1-2  None Seen, 1-2 /hpf Final    WBC, UA 05/27/2024 1-2  None Seen, 1-2 /hpf Final    Epithelial Cells 05/27/2024 Moderate (A)  None Seen, Occasional /hpf Final    Bacteria, UA 05/27/2024 Occasional  None Seen, Occasional /hpf Final    MUCUS THREADS 05/27/2024 Moderate (A)  None Seen Final   Annual Exam on 04/30/2024   Component Date Value Ref Range Status    Case Report 04/30/2024    Final                    Value:Gynecologic Cytology Report                       Case: [id number]                                  Authorizing Provider:  Marija Blackwood MD          Collected:           04/30/2024 0933              Ordering Location:     [facility]  Received:            04/30/2024 0933                                     [facility]                                                           First Screen:          Stfeany Spicer                                                                Specimen:    LIQUID-BASED PAP, SCREENING, Cervix                                                        Primary Interpretation 04/30/2024 Negative for intraepithelial lesion or malignancy   Final    Interpretation 04/30/2024 Shift in alexi suggestive of bacterial vaginosis   Final    Specimen Adequacy 04/30/2024 Satisfactory for evaluation.  Absence of endocervical/transformation zone component.   Final    Note 04/30/2024    Final                    Value:Screening performed at [facility], [address] [id number].                              Additional Information 04/30/2024    Final                    Value:V[employer]'s FDA approved ,  and ThinPrep Imaging Duo System are                           utilized with strict adherence to the 's instruction manual to                           prepare gynecologic and non-gynecologic cytology specimens for the                           production of ThinPrep slides as well as for gynecologic ThinPrep imaging.                           These processes have been validated by our laboratory and/or by the                           .                          The Pap test is not a diagnostic procedure and should not be used as the                           sole means to detect cervical cancer. It is only a screening procedure to                           aid in the detection of cervical cancer and its precursors. Both                           false-negative and false-positive results have been experienced. Your                           patient's test result should be interpreted in this context together with                           the history and clinical findings.    Gross Description 04/30/2024    Final                    Value:20 ml , colorless, cloudy received in a ThinPrep vial.    Trichomonas vaginalis 04/30/2024 Not Detected  Not Detected Final    Gardnerella vaginalis 04/30/2024 Detected (A)  Not Detected Final    Candida Species 04/30/2024 Not Detected  Not Detected Final    HPV Other HR 04/30/2024 Negative  Negative Final    HPV types: 31,33,35,39,45,51,52,56,58,59,66 and 68 DNA are undetectable or below the pre-set threshold.    HPV16 04/30/2024 Negative  Negative Final    HPV18 04/30/2024 Negative  Negative Final    Admission on 03/08/2024, Discharged on 03/08/2024   Component Date Value Ref Range Status    WBC 03/08/2024 6.99  4.31 - 10.16 Thousand/uL Final    RBC 03/08/2024 4.47  3.81 - 5.12 Million/uL Final    Hemoglobin 03/08/2024 12.6  11.5 - 15.4 g/dL Final    Hematocrit 03/08/2024 39.0  34.8 - 46.1 % Final    MCV 03/08/2024 87  82 - 98 fL Final    MCH 03/08/2024 28.2  26.8 - 34.3 pg Final    MCHC 03/08/2024 32.3  31.4 - 37.4 g/dL Final    RDW 03/08/2024 14.4  11.6 - 15.1 % Final    MPV 03/08/2024 9.2  8.9 - 12.7 fL Final    Platelets 03/08/2024 320  149 - 390 Thousands/uL Final    nRBC 03/08/2024 0  /100 WBCs Final    Segmented % 03/08/2024 55  43 - 75 % Final    Immature Grans % 03/08/2024 0  0 - 2 % Final    Lymphocytes % 03/08/2024 31  14 - 44 % Final    Monocytes % 03/08/2024 12  4 - 12 % Final    Eosinophils Relative 03/08/2024 2  0 - 6 % Final    Basophils Relative 03/08/2024 0  0 - 1 % Final    Absolute Neutrophils 03/08/2024 3.78  1.85 - 7.62 Thousands/µL Final    Absolute Immature Grans 03/08/2024 0.02  0.00 - 0.20 Thousand/uL Final    Absolute Lymphocytes 03/08/2024 2.18  0.60 - 4.47 Thousands/µL Final    Absolute Monocytes 03/08/2024 0.82  0.17 - 1.22 Thousand/µL Final    Eosinophils Absolute 03/08/2024 0.16  0.00 - 0.61 Thousand/µL Final    Basophils Absolute 03/08/2024 0.03  0.00 - 0.10 Thousands/µL Final    Sodium 03/08/2024 137  135 - 147 mmol/L Final    Potassium 03/08/2024 3.4 (L)  3.5 - 5.3 mmol/L Final    Chloride 03/08/2024 106  96 - 108 mmol/L Final    CO2 03/08/2024 24  21 - 32 mmol/L Final    ANION GAP 03/08/2024 7  mmol/L Final    BUN 03/08/2024 10  5 - 25 mg/dL Final    Creatinine 03/08/2024 0.69  0.60 - 1.30 mg/dL Final    Standardized to IDMS reference method    Glucose 03/08/2024 95  65 - 140 mg/dL Final    If the patient is fasting, the ADA then defines impaired fasting glucose as > 100 mg/dL and diabetes as > or equal to 123 mg/dL.    Calcium 03/08/2024 8.5  8.4 - 10.2 mg/dL Final    AST  "03/08/2024 12 (L)  13 - 39 U/L Final    ALT 03/08/2024 11  7 - 52 U/L Final    Specimen collection should occur prior to Sulfasalazine administration due to the potential for falsely depressed results.     Alkaline Phosphatase 03/08/2024 77  34 - 104 U/L Final    Total Protein 03/08/2024 7.1  6.4 - 8.4 g/dL Final    Albumin 03/08/2024 4.1  3.5 - 5.0 g/dL Final    Total Bilirubin 03/08/2024 0.27  0.20 - 1.00 mg/dL Final    Use of this assay is not recommended for patients undergoing treatment with eltrombopag due to the potential for falsely elevated results.  N-acetyl-p-benzoquinone imine (metabolite of Acetaminophen) will generate erroneously low results in samples for patients that have taken an overdose of Acetaminophen.    eGFR 03/08/2024 111  ml/min/1.73sq m Final    hs TnI 0hr 03/08/2024 <2  \"Refer to ACS Flowchart\"- see link ng/L Final    Comment:                                              Initial (time 0) result  If >=50 ng/L, Myocardial injury suggested ;  Type of myocardial injury and treatment strategy  to be determined.  If 5-49 ng/L, a delta result at 2 hours and or 4 hours will be needed to further evaluate.  If <4 ng/L, and chest pain has been >3 hours since onset, patient may qualify for discharge based on the HEART score in the ED.  If <5 ng/L and <3hours since onset of chest pain, a delta result at 2 hours will be needed to further evaluate.    HS Troponin 99th Percentile URL of a Health Population=12 ng/L with a 95% Confidence Interval of 8-18 ng/L.    Second Troponin (time 2 hours)  If calculated delta >= 20 ng/L,  Myocardial injury suggested ; Type of myocardial injury and treatment strategy to be determined.  If 5-49 ng/L and the calculated delta is 5-19 ng/L, consult medical service for evaluation.  Continue evaluation for ischemia on ecg and other possible etiology and repeat hs troponin at 4 hours.  If delta                            is <5 ng/L at 2 hours, consider discharge based on risk " stratification via the HEART score (if in ED), or PRAMOD risk score in IP/Observation.    HS Troponin 99th Percentile URL of a Health Population=12 ng/L with a 95% Confidence Interval of 8-18 ng/L.    Lipase 03/08/2024 17  11 - 82 u/L Final    EXT Preg Test, Ur 03/08/2024 Negative   Final    Control 03/08/2024 Valid   Final    Color, UA 03/08/2024 Yellow   Final    Clarity, UA 03/08/2024 Turbid   Final    Specific Gravity, UA 03/08/2024 1.027  1.003 - 1.030 Final    pH, UA 03/08/2024 5.5  4.5, 5.0, 5.5, 6.0, 6.5, 7.0, 7.5, 8.0 Final    Leukocytes, UA 03/08/2024 Negative  Negative Final    Nitrite, UA 03/08/2024 Negative  Negative Final    Protein, UA 03/08/2024 30 (1+) (A)  Negative mg/dl Final    Glucose, UA 03/08/2024 Negative  Negative mg/dl Final    Ketones, UA 03/08/2024 Trace (A)  Negative mg/dl Final    Urobilinogen, UA 03/08/2024 <2.0  <2.0 mg/dl mg/dl Final    Bilirubin, UA 03/08/2024 Negative  Negative Final    Occult Blood, UA 03/08/2024 Trace (A)  Negative Final    SARS-CoV-2 03/08/2024 Negative  Negative Final         INFLUENZA A PCR 03/08/2024 Negative  Negative Final         INFLUENZA B PCR 03/08/2024 Negative  Negative Final         RSV PCR 03/08/2024 Negative  Negative Final         TSH 3RD GENERATON 03/08/2024 2.818  0.450 - 4.500 uIU/mL Final    The recommended reference ranges for TSH during pregnancy are as follows:   First trimester 0.100 to 2.500 uIU/mL   Second trimester  0.200 to 3.000 uIU/mL   Third trimester 0.300 to 3.000 uIU/m    Note: Normal ranges may not apply to patients who are transgender, non-binary, or whose legal sex, sex at birth, and gender identity differ.  Adult TSH (3rd generation) reference range follows the recommended guidelines of the [employer], January, 2020.    Ventricular Rate 03/08/2024 82  BPM Final    Atrial Rate 03/08/2024 82  BPM Final    IA Interval 03/08/2024 148  ms Final    QRSD Interval 03/08/2024 84  ms Final    QT Interval 03/08/2024 394   ms Final    QTC Interval 03/08/2024 460  ms Final    P Axis 03/08/2024 51  degrees Final    QRS [place] 03/08/2024 9  degrees Final    T Wave Axis 03/08/2024 14  degrees Final    RBC, UA 03/08/2024 1-2  None Seen, 1-2 /hpf Final    WBC, UA 03/08/2024 4-10 (A)  None Seen, 1-2 /hpf Final    Epithelial Cells 03/08/2024 Occasional  None Seen, Occasional /hpf Final    Bacteria, UA 03/08/2024 None Seen  None Seen, Occasional /hpf Final    MUCUS THREADS 03/08/2024 Innumerable (A)  None Seen Final   Appointment on 01/12/2024   Component Date Value Ref Range Status    TSH 3RD GENERATON 01/12/2024 2.071  0.450 - 4.500 uIU/mL Final    The recommended reference ranges for TSH during pregnancy are as follows:   First trimester 0.100 to 2.500 uIU/mL   Second trimester  0.200 to 3.000 uIU/mL   Third trimester 0.300 to 3.000 uIU/m    Note: Normal ranges may not apply to patients who are transgender, non-binary, or whose legal sex, sex at birth, and gender identity differ.  Adult TSH (3rd generation) reference range follows the recommended guidelines of the [employer], January, 2020.    Cholesterol 01/12/2024 133  See Comment mg/dL Final    Cholesterol:         Pediatric <18 Years        Desirable          <170 mg/dL      Borderline High    170-199 mg/dL      High               >=200 mg/dL        Adult >=18 Years            Desirable         <200 mg/dL      Borderline High   200-239 mg/dL      High              >239 mg/dL      Triglycerides 01/12/2024 87  See Comment mg/dL Final    Triglyceride:     0-9Y            <75mg/dL     10Y-17Y         <90 mg/dL       >=18Y     Normal          <150 mg/dL     Borderline High 150-199 mg/dL     High            200-499 mg/dL        Very High       >499 mg/dL    Specimen collection should occur prior to Metamizole administration due to the potential for falsely depressed results.    HDL, Direct 01/12/2024 37 (L)  >=50 mg/dL Final    LDL Calculated 01/12/2024 79  0 - 100 mg/dL Final     LDL Cholesterol:     Optimal           <100 mg/dl     Near Optimal      100-129 mg/dl     Above Optimal       Borderline High 130-159 mg/dl       High            160-189 mg/dl       Very High       >189 mg/dl         This screening LDL is a calculated result.   It does not have the accuracy of the Direct Measured LDL in the monitoring of patients with hyperlipidemia and/or statin therapy.   Direct Measure LDL (LAB142) must be ordered separately in these patients.    Hemoglobin A1C 01/12/2024 6.0 (H)  Normal 4.0-5.6%; PreDiabetic 5.7-6.4%; Diabetic >=6.5%; Glycemic control for adults with diabetes <7.0% % Final    EAG 01/12/2024 126  mg/dl Final    Insulin, Fasting 01/12/2024 9.56  1.90 - 23.00 uIU/mL Final

## 2024-07-15 ENCOUNTER — OFFICE VISIT (OUTPATIENT)
Dept: OBGYN CLINIC | Facility: MEDICAL CENTER | Age: 39
End: 2024-07-15
Payer: COMMERCIAL

## 2024-07-15 VITALS — BODY MASS INDEX: 43.38 KG/M2 | WEIGHT: 222.1 LBS | SYSTOLIC BLOOD PRESSURE: 100 MMHG | DIASTOLIC BLOOD PRESSURE: 70 MMHG

## 2024-07-15 DIAGNOSIS — R23.2 HOT FLASHES: ICD-10-CM

## 2024-07-15 DIAGNOSIS — N80.03 ADENOMYOSIS: Primary | ICD-10-CM

## 2024-07-15 PROCEDURE — 99214 OFFICE O/P EST MOD 30 MIN: CPT | Performed by: OBSTETRICS & GYNECOLOGY

## 2024-07-15 NOTE — PROGRESS NOTES
Assessment:  38 y.o.  who presents to review adenomyosis. Considering hysterectomy vs return to IUD. Both options reviewed in detail.     Plan:  Diagnoses and all orders for this visit:    Adenomyosis  - Considering IUD vs TLH  - Written literature provided  - Will follow up after a few days to determine definitive plan    Hot flashes  -     Follicle stimulating hormone; Future  -     Luteinizing hormone; Future  -     Estradiol; Future        __________________________________________________________________    Subjective   Renee Wall is a 38 y.o.  who presents to review US results in setting of AUB.    Patient reports regular but increasingly painful, heavy flows. Currently reports when at its worst, its 3d of heavy flow. Sometimes not that bad, but lasts up to 7d when its lighter in onset, 4-5d when it starts heavy. Pain is her most pertinent complaint though, and menses are increasingly painful. Described as cramping, midline/pelvic but severe and radiating down both legs. Is in PT for legs and notes this cycle symptoms were so bad she was unable to participate in PT. Pain meds only take the edge off, often utilizing heating pad and falling asleep with it on as its the only comfort she gets. Also unsure if psb symptoms related to an early menopause. Reports early menarche and was always told might contribute to earlier menopause. Lately has been having some hot flashes, vaginal dryness too.    Reviewed US in detail, notable for adenomyosis. Discussed this condition in detail, incl association with painful menses, enlarged uterus, and bleeding. Discussed mgmt pending goals of future fertility and tx preferences. Reviewed contraceptives, with special attention to IUD's, ablation, and hysterectomy. Pt has hx mirena and liletta IUD, did great and largely amenorrheic on it. Currently trying to lose weight and has recently gotten Rx for wegovy (not yet started). Motivated to avoid options that would  "likely cause weight gain. Gained on depo prior. Discussed the strong association between depo and weight gain. She also notes she is no longer considering TTC, and her mother/grandmother had similar courses with their menses requiring hysterectomy. She is considering this as an option but is unsure preferences. Wants to consider.     In anticipation of psb surgery, surgical consents reviewed with pt for hyst. The procedure was reviewed in detail with the patient. We reviewed the risks of the procedure include bleeding, infection, injury to bowel/bladder/ureters/neurovascular structures, or diagnosis of occult pathology. Discussed that this would result in her being unable to carry future pregnancies, as the uterus or \"womb\" is removed.  Patient had the opportunity to ask questions, which were answered to her satisfaction. Informed consent was obtained.       The following portions of the patient's history were reviewed and updated as appropriate: allergies, current medications, past family history, past medical history, past social history, past surgical history, and problem list.    Review of Systems  Review of Systems   Constitutional:  Negative for chills, fatigue and fever.   Respiratory:  Negative for shortness of breath.    Cardiovascular:  Negative for chest pain and palpitations.   Gastrointestinal:  Negative for abdominal distention, abdominal pain, constipation, diarrhea, nausea and vomiting.   Endocrine: Positive for heat intolerance. Negative for cold intolerance.   Genitourinary:  Positive for dyspareunia, menstrual problem and pelvic pain. Negative for dysuria, flank pain, frequency, genital sores, vaginal bleeding, vaginal discharge and vaginal pain.   Skin:  Negative for rash and wound.   Neurological:  Negative for dizziness, light-headedness and headaches.            Objective  /70   Wt 101 kg (222 lb 1.6 oz)   LMP 07/08/2024 (Exact Date)   BMI 43.38 kg/m²      Physical Exam:  Physical " Exam  Constitutional:       General: She is not in acute distress.     Appearance: Normal appearance. She is not ill-appearing, toxic-appearing or diaphoretic.   Eyes:      General: No scleral icterus.        Right eye: No discharge.         Left eye: No discharge.      Conjunctiva/sclera: Conjunctivae normal.   Cardiovascular:      Rate and Rhythm: Normal rate.   Pulmonary:      Effort: Pulmonary effort is normal. No respiratory distress.   Abdominal:      General: There is no distension.      Palpations: There is no mass.      Tenderness: There is no abdominal tenderness. There is no guarding or rebound.      Hernia: No hernia is present.   Musculoskeletal:         General: No swelling.   Skin:     General: Skin is warm and dry.      Coloration: Skin is not jaundiced or pale.      Findings: No bruising or erythema.   Neurological:      Mental Status: She is alert.   Psychiatric:         Mood and Affect: Mood normal.         Behavior: Behavior normal.         Thought Content: Thought content normal.         Judgment: Judgment normal.           Lab Review  CBC 6/26/24  TSH 3/8/24  Pelvic US 6/26/24

## 2024-07-30 ENCOUNTER — TELEPHONE (OUTPATIENT)
Dept: GYNECOLOGY | Facility: CLINIC | Age: 39
End: 2024-07-30

## 2024-07-30 ENCOUNTER — TELEPHONE (OUTPATIENT)
Dept: OBGYN CLINIC | Facility: MEDICAL CENTER | Age: 39
End: 2024-07-30

## 2024-07-30 NOTE — TELEPHONE ENCOUNTER
----- Message from Fatmata Perez MD sent at 2024 12:38 PM EDT -----  Regarding: f/u vs schedule surgery  [facility] GYN Department  Surgery Scheduling Sheet    Patient Name: Renee Wall  : 1985    AT LAST VISIT had been counseled TLH vs IUD. If desires IUD, please schedule. If prefers TLH, needs EMB preop    Provider: Fatmata Perez MD     Needed: no; Language: N/A    Procedure: exam under anesthesia, total laparoscopic hysterectomy, cystoscopy, and bilateral salpingectomy    Diagnosis: adenomyosis    Special Needs or Equipment: none    Anesthesia: General anesthesia    Length of stay: outpatient  Does patient have comorbid conditions that will require close perioperative monitoring prior to safe discharge: no    Pre-Admission Testing Needed: yes   Labs that should be ordered: cbc, hgb A1C, and type and screen    Order PAT that is recommended in prep for procedure?: Yes    Medical Clearance Needed: no; Provider: N/A    MA Form Signed (tubals/hysterectomy): yes    Surgical Drink Given: no     How many days out of work: 6 week(s)     How many days no drivin week(s)       Is pre op appt needed?  Yes, emb  Interval for post op appt: 2 week(s)     For Surgical Scheduler:     Surgery Scheduled On:  [place]: [facility]    Pre-op Appt:   Post op Appt:  Consult/Medical clearance appt:

## 2024-07-30 NOTE — TELEPHONE ENCOUNTER
----- Message from Chelle LOVE sent at 2024 10:38 AM EDT -----  Regarding: RE: f/u vs schedule surgery    ----- Message -----  From: Fatmata Perez MD  Sent: 2024  12:40 PM EDT  To: Chelle Hebert  Subject: f/u vs schedule surgery                          [facility] GYN Department  Surgery Scheduling Sheet    Patient Name: Renee Wall  : 1985    AT LAST VISIT had been counseled TLH vs IUD. If desires IUD, please schedule. If prefers TLH, needs EMB preop    Provider: Fatmata Perez MD     Needed: no; Language: N/A    Procedure: exam under anesthesia, total laparoscopic hysterectomy, cystoscopy, and bilateral salpingectomy    Diagnosis: adenomyosis    Special Needs or Equipment: none    Anesthesia: General anesthesia    Length of stay: outpatient  Does patient have comorbid conditions that will require close perioperative monitoring prior to safe discharge: no    Pre-Admission Testing Needed: yes   Labs that should be ordered: cbc, hgb A1C, and type and screen    Order PAT that is recommended in prep for procedure?: Yes    Medical Clearance Needed: no; Provider: N/A    MA Form Signed (tubals/hysterectomy): yes    Surgical Drink Given: no     How many days out of work: 6 week(s)     How many days no drivin week(s)       Is pre op appt needed?  Yes, emb  Interval for post op appt: 2 week(s)     For Surgical Scheduler:     Surgery Scheduled On:  [place]: [facility]    Pre-op Appt:   Post op Appt:  Consult/Medical clearance appt:

## 2024-07-30 NOTE — TELEPHONE ENCOUNTER
Left message to see what patients thoughts are surgery vs IUD?  Left teams number for her to call back

## 2024-07-30 NOTE — TELEPHONE ENCOUNTER
Spoke with patient.. She will have her surgery on Oct 30, 2024.. Pre-op and post-op appointments scheduled and patient definitely wants the surgery.. Will enter the case in at a later time.

## 2024-07-31 ENCOUNTER — TELEPHONE (OUTPATIENT)
Dept: OBGYN CLINIC | Facility: MEDICAL CENTER | Age: 39
End: 2024-07-31

## 2024-07-31 ENCOUNTER — NURSE TRIAGE (OUTPATIENT)
Age: 39
End: 2024-07-31

## 2024-07-31 DIAGNOSIS — B96.89 BV (BACTERIAL VAGINOSIS): Primary | ICD-10-CM

## 2024-07-31 DIAGNOSIS — N76.0 BV (BACTERIAL VAGINOSIS): Primary | ICD-10-CM

## 2024-07-31 NOTE — TELEPHONE ENCOUNTER
Spoke with patient yesterday and we chose the Oct 30, 2024 date for her surgery.. Case entered, labs ordered.. Messaged patient thru My chart about her labs and when to do them.. Teams number given..

## 2024-07-31 NOTE — TELEPHONE ENCOUNTER
----- Message from Chelle LOVE sent at 2024  3:33 PM EDT -----  Regarding: RE: f/u vs schedule surgery    ----- Message -----  From: Chelle Hebert  Sent: 2024  10:38 AM EDT  To: Chelle Hebert  Subject: RE: f/u vs schedule surgery                        ----- Message -----  From: Fatmata Perez MD  Sent: 2024  12:40 PM EDT  To: Chelle Hebert  Subject: f/u vs schedule surgery                          [facility] GYN Department  Surgery Scheduling Sheet    Patient Name: Renee Wall  : 1985    AT LAST VISIT had been counseled TLH vs IUD. If desires IUD, please schedule. If prefers TLH, needs EMB preop    Provider: Fatmata Perez MD     Needed: no; Language: N/A    Procedure: exam under anesthesia, total laparoscopic hysterectomy, cystoscopy, and bilateral salpingectomy    Diagnosis: adenomyosis    Special Needs or Equipment: none    Anesthesia: General anesthesia    Length of stay: outpatient  Does patient have comorbid conditions that will require close perioperative monitoring prior to safe discharge: no    Pre-Admission Testing Needed: yes   Labs that should be ordered: cbc, hgb A1C, and type and screen    Order PAT that is recommended in prep for procedure?: Yes    Medical Clearance Needed: no; Provider: N/A    MA Form Signed (tubals/hysterectomy): yes    Surgical Drink Given: no     How many days out of work: 6 week(s)     How many days no drivin week(s)       Is pre op appt needed?  Yes, emb  Interval for post op appt: 2 week(s)     For Surgical Scheduler:     Surgery Scheduled On:  [place]: [facility]    Pre-op Appt:   Post op Appt:  Consult/Medical clearance appt:

## 2024-07-31 NOTE — TELEPHONE ENCOUNTER
Regarding: Possibly BV - abnormal odor  ----- Message from Kinga DAILY sent at 7/31/2024  4:05 PM EDT -----  Patient called stating she believes she hs BV again. She was prescribed metroNIDAZOLE (FLAGYL) 500 mg tablet back in April for this. States she has an abnormal odor and stated using the pH balance wash again that may have caused this.

## 2024-08-01 NOTE — TELEPHONE ENCOUNTER
Left message for patient to call back with symptoms and when began. Please describe discharge and odor  Will then send to DR. Blackwood for prescription . Seen 5/1/24

## 2024-08-02 RX ORDER — METRONIDAZOLE 500 MG/1
500 TABLET ORAL 2 TIMES DAILY
Qty: 14 TABLET | Refills: 0 | Status: SHIPPED | OUTPATIENT
Start: 2024-08-02 | End: 2024-08-09

## 2024-08-02 NOTE — TELEPHONE ENCOUNTER
Patient called back.      Attempted to warm transfer to [facility].      Patient describes just odor fishy smell.    NO itching or discharge at this time.    This started a week ago.    She did say she used feminine wash and had intercourse.

## 2024-08-02 NOTE — TELEPHONE ENCOUNTER
"Reason for Disposition  • Symptoms of a yeast infection (i.e., itchy, white discharge, not bad smelling) and feels like prior vaginal yeast infections     Vaginal odor, consistent with prior dx of BV    Answer Assessment - Initial Assessment Questions  Renee reports fishy vaginal odor x one week. Denies vaginal discharge irritation. Denies chance for retained tampon. Has used flagyl in past with resolution of symptoms.  Requesting retreatment.  Aware cannot consume alcohol while on flagyl.    Confirmed allergies and pharmacy in chart.    Advised open to air at bedtime, avoid perfume soaps and products, recommend cotton underwear only, no thongs, wear looser fit clothing, change out of wet clothing after exercise and/or bathing suits ASAP. May use A&D/Aquaphor oint externally to alleviate irration, wash with Cetaphil cleanser, watch sugar and carb intake. If symptoms persist after treatment,  contact the office and schedule pelvic exam for further evaluation.     Escalated to RN for Rx per BV protocol.           1. SYMPTOM: \"What's the main symptom you're concerned about?\" (e.g., pain, itching, dryness)      Fishy odor  2. LOCATION: \"Where is the  odor located?\" (e.g., inside/outside, left/right)      vagina  3. ONSET: \"When did the  odor  start?\"      One week ago  4. PAIN: \"Is there any pain?\" If Yes, ask: \"How bad is it?\" (Scale: 1-10; mild, moderate, severe)      denies  5. ITCHING: \"Is there any itching?\" If Yes, ask: \"How bad is it?\" (Scale: 1-10; mild, moderate, severe)      denies  6. CAUSE: \"What do you think is causing the discharge?\" \"Have you had the same problem before? What happened then?\"      BV  7. OTHER SYMPTOMS: \"Do you have any other symptoms?\" (e.g., fever, itching, vaginal bleeding, pain with urination, injury to genital area, vaginal foreign body)      denies  8. PREGNANCY: \"Is there any chance you are pregnant?\" \"When was your last menstrual period?\"      LMP 07/08/2024    Protocols used: " Vaginal Symptoms-ADULT-[place]

## 2024-08-06 ENCOUNTER — TELEPHONE (OUTPATIENT)
Age: 39
End: 2024-08-06

## 2024-08-06 NOTE — TELEPHONE ENCOUNTER
Patient called in to say her [facility] insurance is not active currently since she is out on WC.  The [employer] is still active (and primary for now)

## 2024-08-06 NOTE — TELEPHONE ENCOUNTER
PA for Wegovy 0.25mg SUBMITTED to secondary- retrieved primary denial.    via    [x]CMM-KEY: [id number]  []Surescripts-Case ID #   []Faxed to plan   []Other website   []Phone call Case ID #     Office notes sent, clinical questions answered. Awaiting determination    Turnaround time for your insurance to make a decision on your Prior Authorization can take 7-21 business days.

## 2024-08-06 NOTE — TELEPHONE ENCOUNTER
PA for Wegovy 0.25mg SUBMITTED to [facility] first- if denied will send to [facility]    via    [x]CMM-KEY: [id number]  []Megha-Case ID #   []Faxed to plan   []Other website   []Phone call Case ID #     Office notes sent, clinical questions answered. Awaiting determination    Turnaround time for your insurance to make a decision on your Prior Authorization can take 7-21 business days.

## 2024-08-07 NOTE — TELEPHONE ENCOUNTER
PA for Wegovy 0.25mg Approved by [employer]     Date(s) approved 8/6/24- 2/6/2025    Case #: n/a    Patient advised by          [x] MyChart Message  [] Phone call   []LMOM  []L/M to call office as no active Communication consent on file  []Unable to leave detailed message as VM not approved on Communication consent       Pharmacy advised by    [x]Fax  []Phone call    Approval letter scanned into [employer] Yes

## 2024-08-21 ENCOUNTER — CLINICAL SUPPORT (OUTPATIENT)
Dept: BARIATRICS | Facility: CLINIC | Age: 39
End: 2024-08-21

## 2024-08-21 VITALS
SYSTOLIC BLOOD PRESSURE: 110 MMHG | BODY MASS INDEX: 43.35 KG/M2 | HEIGHT: 60 IN | DIASTOLIC BLOOD PRESSURE: 75 MMHG | HEART RATE: 83 BPM | RESPIRATION RATE: 17 BRPM | WEIGHT: 220.8 LBS | TEMPERATURE: 98.5 F

## 2024-08-21 DIAGNOSIS — R63.5 ABNORMAL WEIGHT GAIN: Primary | ICD-10-CM

## 2024-08-21 PROCEDURE — RECHECK

## 2024-08-21 NOTE — PROGRESS NOTES
Patient last visit weight:222 LB  Patient current visit weight: 220 LB    If you are taking phentermine or other oral weight loss medications, are you experiencing any of the following symptoms:  Headache:   Blurred Vision:   Chest Pain:   Palpitations:  Insomnia:   SPECIFY ORAL MEDICATION AND DOSAGE:     If you are taking an injectable medication,  are you experiencing any of the following symptoms:  Bloating:  No  Nausea: No  Vomiting:  No  Constipation:  Some  Diarrhea: No  SPECIFY INJECTABLE MEDICATION AND CURRENT DOSAGE:Wegovy 0.25 mg    Just started last week, dose increase , no issues with previous dose, she will increase protein and water and and mirlax as well      Vitals:    Is BP less than 100/60? No  Is BP greater than 140/90? No  Is HR greater than 100? No  **If yes to any of the above, have patient relax and repeat in 5-10 minutes**    Repeat values:    Is BP less than 100/60?  Is BP greater than 140/90?  Is HR greater than 100?  **If values remain outside of ranges above, please consult provider for next steps**

## 2024-08-29 ENCOUNTER — APPOINTMENT (OUTPATIENT)
Dept: LAB | Facility: HOSPITAL | Age: 39
End: 2024-08-29
Payer: COMMERCIAL

## 2024-08-29 ENCOUNTER — TELEPHONE (OUTPATIENT)
Age: 39
End: 2024-08-29

## 2024-08-29 DIAGNOSIS — R23.2 HOT FLASHES: ICD-10-CM

## 2024-08-29 DIAGNOSIS — Z01.818 PRE-OP TESTING: ICD-10-CM

## 2024-08-29 LAB
25(OH)D3 SERPL-MCNC: 26.1 NG/ML (ref 30–100)
CRP SERPL QL: 10.6 MG/L
ERYTHROCYTE [SEDIMENTATION RATE] IN BLOOD: 21 MM/HOUR (ref 0–19)
ESTRADIOL SERPL-MCNC: 132.9 PG/ML
FSH SERPL-ACNC: 3.1 MIU/ML
LH SERPL-ACNC: 3.5 MIU/ML

## 2024-08-29 PROCEDURE — 83002 ASSAY OF GONADOTROPIN (LH): CPT

## 2024-08-29 PROCEDURE — 82670 ASSAY OF TOTAL ESTRADIOL: CPT

## 2024-08-29 PROCEDURE — 83001 ASSAY OF GONADOTROPIN (FSH): CPT

## 2024-08-29 NOTE — TELEPHONE ENCOUNTER
Patient of Tracey Montoya. Has Nurse appointment scheduled on 10/17/24. Is requesting a refill on her Wegovy. Currently on 0.25 mg dose. Last injection will be next week.    Pharmacy-[facility]

## 2024-08-30 DIAGNOSIS — E66.01 OBESITY, CLASS III, BMI 40-49.9 (MORBID OBESITY) (HCC): Primary | ICD-10-CM

## 2024-09-03 DIAGNOSIS — E66.01 OBESITY, CLASS III, BMI 40-49.9 (MORBID OBESITY) (HCC): ICD-10-CM

## 2024-09-23 ENCOUNTER — CONSULT (OUTPATIENT)
Dept: OBGYN CLINIC | Facility: MEDICAL CENTER | Age: 39
End: 2024-09-23
Payer: COMMERCIAL

## 2024-09-23 VITALS
BODY MASS INDEX: 42.41 KG/M2 | WEIGHT: 216 LBS | HEIGHT: 60 IN | DIASTOLIC BLOOD PRESSURE: 60 MMHG | SYSTOLIC BLOOD PRESSURE: 110 MMHG

## 2024-09-23 DIAGNOSIS — N80.03 ADENOMYOSIS: Primary | ICD-10-CM

## 2024-09-23 DIAGNOSIS — Z01.812 PRE-PROCEDURE LAB EXAM: ICD-10-CM

## 2024-09-23 DIAGNOSIS — R35.0 URINARY FREQUENCY: ICD-10-CM

## 2024-09-23 LAB — SL AMB POCT URINE HCG: NORMAL

## 2024-09-23 PROCEDURE — 88305 TISSUE EXAM BY PATHOLOGIST: CPT | Performed by: PATHOLOGY

## 2024-09-23 PROCEDURE — 81025 URINE PREGNANCY TEST: CPT | Performed by: OBSTETRICS & GYNECOLOGY

## 2024-09-23 PROCEDURE — 99213 OFFICE O/P EST LOW 20 MIN: CPT | Performed by: OBSTETRICS & GYNECOLOGY

## 2024-09-23 PROCEDURE — 58100 BIOPSY OF UTERUS LINING: CPT | Performed by: OBSTETRICS & GYNECOLOGY

## 2024-09-23 RX ORDER — ISOPROPYL ALCOHOL 0.7 ML/ML
SWAB TOPICAL
COMMUNITY
Start: 2024-09-03

## 2024-09-26 PROCEDURE — 88305 TISSUE EXAM BY PATHOLOGIST: CPT | Performed by: PATHOLOGY

## 2024-10-09 NOTE — PROGRESS NOTES
Endometrial biopsy    Date/Time: 9/23/2024 4:15 PM    Performed by: Fatmata Perez MD  Authorized by: Fatmata Perez MD  Universal Protocol:  procedure performed by consultantConsent: Verbal consent obtained. Written consent obtained.  Risks and benefits: risks, benefits and alternatives were discussed  Consent given by: patient  Patient understanding: patient states understanding of the procedure being performed  Patient consent: the patient's understanding of the procedure matches consent given  Procedure consent: procedure consent matches procedure scheduled  Required items: required blood products, implants, devices, and special equipment available    Indication:     Indications: Other disorder of menstruation and other abnormal bleeding from female genital tract    Pre-procedure:     Premeds:  Ibuprofen  Procedure:     Procedure: endometrial biopsy with Pipelle      A bivalve speculum was placed in the vagina: yes      Cervix cleaned and prepped: yes      The cervix was dilated: yes      Uterus sounded: yes      Uterus sound depth (cm):  6    Specimen collected: specimen collected and sent to pathology      Patient tolerated procedure well with no complications: yes

## 2024-10-09 NOTE — PROGRESS NOTES
History & Physical - OB/GYN   Renee Wall 38 y.o. female MRN: [id number]  Unit/Bed#:  Encounter: [id number]      Chief complaint:  preop    HPI:  Ms Wall is a 38 y.o.  presenting for preop exam and EMB for planned TLH, BS, cysto for adenomyosis. She notes some urinary frequency today. No dysuria, urgency, suprapubic pain. Some mild cramping. Denies fever/chills, dizziness, sob, cp, n/v/d.    The procedure was reviewed in detail with the patient. We reviewed the risks of the procedure include bleeding, infection, injury to bowel/bladder/ureters/neurovascular structures, or diagnosis of occult pathology. We reviewed that an EMB is performed to reasonably exclude malignancy, but that this only assesses the endometrium and is not as comprehensive as the surgical pathology will be. We reviewed preop instructions, same-day surgery, recovery, and follow up. Patient had the opportunity to ask questions, which were answered to her satisfaction. Informed consent was obtained.         Active Problems:  Patient Active Problem List   Diagnosis    Asthma, mild persistent    BPPV (benign paroxysmal positional vertigo)    Mild single current episode of major depressive disorder (HCC)    Seasonal allergies    History of anxiety disorder    Primary insomnia    Chronic arthralgias of knees and hips    Myalgia    Chronic bilateral low back pain without sciatica    Gastroesophageal reflux disease without esophagitis    Obesity, Class III, BMI 40-49.9 (morbid obesity) (HCC)    Left breast mass    Abnormal finding on breast imaging    Family history of breast cancer    Family history of ovarian cancer    Bilateral hand pain    Right ankle sprain    Avulsion fracture of navicular bone of right foot    Sprain of anterior talofibular ligament of right ankle, subsequent encounter    Closed avulsion fracture of navicular bone of right foot with routine healing, subsequent encounter    Prediabetes       PMH:  Past Medical History:    Diagnosis Date    Allergic     Anxiety     Asthma     BRCA1 negative     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     Depression     GERD (gastroesophageal reflux disease)     Obesity     Scoliosis     Visual impairment        PSH:  Past Surgical History:   Procedure Laterality Date    HERNIA REPAIR         Social Hx:  Social History     Tobacco Use    Smoking status: Former     Current packs/day: 0.00     Types: Cigarettes     Quit date:      Years since quittin.7    Smokeless tobacco: Never   Vaping Use    Vaping status: Never Used   Substance Use Topics    Alcohol use: Not Currently     Comment: occasionally    Drug use: Never       OB Hx:  OB History    Para Term  AB Living   4 2 2 0 2 2   SAB IAB Ectopic Multiple Live Births   2 0 0 0 2      # Outcome Date GA Lbr Aristides/2nd Weight Sex Type Anes PTL Lv   4 Term 2009    F Vag-Spont   FREDERICK   3 SAB 2008           2 SAB 2005           1 Term 2004    M Vag-Spont   FREDERICK      Obstetric Comments   Menarche-9   Age at first pregnancy-18   depro-1yr   bcp-5yrs       Meds:  Current Outpatient Medications on File Prior to Visit   Medication Sig Dispense Refill    albuterol (2.5 mg/3 mL) 0.083 % nebulizer solution Take 3 mL (2.5 mg total) by nebulization every 6 (six) hours as needed for wheezing or shortness of breath 75 mL 3    Alcohol Swabs (Pharmacist Choice Alcohol) PADS       celecoxib (CeleBREX) 200 mg capsule Take 1 capsule (200 mg total) by mouth 2 (two) times a day 180 capsule 1    cyclobenzaprine (FLEXERIL) 5 mg tablet Take 1 tablet (5 mg total) by mouth daily at bedtime 30 tablet 6    Diclofenac Sodium (VOLTAREN) 1 % Apply 2 g topically 4 (four) times a day 100 g 6    ergocalciferol (VITAMIN D2) 50,000 units Take 1 capsule (50,000 Units total) by mouth once a week 12 capsule 1    famotidine (PEPCID) 20 mg tablet Take 1 tablet (20 mg total) by mouth 2 (two) times a day 30 tablet 0    fexofenadine-pseudoephedrine (Allegra-D Allergy & Congestion) 180-240 MG per 24 hr  tablet Take 1 tablet by mouth daily 90 tablet 1    Fluticasone Furoate-Vilanterol 100-25 mcg/actuation inhaler Inhale 1 puff daily Rinse mouth after use. 60 blister 5    gabapentin (Neurontin) 100 mg capsule Take 1 capsule (100 mg total) by mouth 3 (three) times a day During a pain flare-up 90 capsule 6    ibuprofen (MOTRIN) 600 mg tablet TAKE 1 TABLET EVERY 6 HOURS FOR FIRST 48 HOURS THEN AS NEEDED FOR PAIN      levalbuterol (Xopenex HFA) 45 mcg/act inhaler Inhale 1-2 puffs every 4 (four) hours as needed for wheezing or shortness of breath 15 g 0    omeprazole (PriLOSEC) 40 MG capsule Take 1 capsule (40 mg total) by mouth daily 90 capsule 1    Semaglutide-Weight Management (WEGOVY) 0.5 MG/0.5ML Inject 0.5 mL (0.5 mg total) under the skin once a week 2 mL 0     No current facility-administered medications on file prior to visit.       Allergies:  No Known Allergies      Physical Exam:  /60   Ht 5' (1.524 m)   Wt 98 kg (216 lb)   LMP 09/01/2024   BMI 42.18 kg/m²     Physical Exam  Exam conducted with a chaperone present.   Constitutional:       General: She is not in acute distress.     Appearance: Normal appearance. She is not ill-appearing, toxic-appearing or diaphoretic.   Eyes:      General: No scleral icterus.        Right eye: No discharge.         Left eye: No discharge.      Conjunctiva/sclera: Conjunctivae normal.   Cardiovascular:      Rate and Rhythm: Normal rate and regular rhythm.      Heart sounds: Normal heart sounds. No murmur heard.     No friction rub.   Pulmonary:      Effort: Pulmonary effort is normal. No respiratory distress.      Breath sounds: Normal breath sounds. No wheezing or rales.   Abdominal:      General: There is no distension.      Palpations: There is no mass.      Tenderness: There is no abdominal tenderness. There is no guarding or rebound.      Hernia: No hernia is present.   Genitourinary:     General: Normal vulva.      Exam position: Lithotomy position.      Labia:          Right: No rash, tenderness or lesion.         Left: No rash, tenderness or lesion.       Urethra: No prolapse, urethral swelling or urethral lesion.      Vagina: No signs of injury. No vaginal discharge, erythema, tenderness, bleeding or prolapsed vaginal walls.      Cervix: No cervical motion tenderness, discharge, friability, lesion, erythema or cervical bleeding.   Musculoskeletal:         General: No swelling.   Skin:     General: Skin is warm and dry.      Coloration: Skin is not jaundiced or pale.      Findings: No bruising or erythema.   Neurological:      Mental Status: She is alert.   Psychiatric:         Mood and Affect: Mood normal.         Behavior: Behavior normal.         Thought Content: Thought content normal.         Judgment: Judgment normal.           Assessment:   38 y.o.  presenting for preop exam and EMB for planned TLH, BS, cysto for adenomyosis.     Plan:   1. To OR as planned  2. NPO midnight preop  3. Surgical scrub and ERAS protocol reviewed  4. EMB collected  5. Return 1-2wk postop

## 2024-10-16 ENCOUNTER — TELEPHONE (OUTPATIENT)
Age: 39
End: 2024-10-16

## 2024-10-17 ENCOUNTER — CLINICAL SUPPORT (OUTPATIENT)
Dept: BARIATRICS | Facility: CLINIC | Age: 39
End: 2024-10-17

## 2024-10-17 ENCOUNTER — TELEPHONE (OUTPATIENT)
Dept: BARIATRICS | Facility: CLINIC | Age: 39
End: 2024-10-17

## 2024-10-17 ENCOUNTER — APPOINTMENT (OUTPATIENT)
Dept: LAB | Facility: HOSPITAL | Age: 39
End: 2024-10-17
Payer: COMMERCIAL

## 2024-10-17 VITALS
RESPIRATION RATE: 17 BRPM | SYSTOLIC BLOOD PRESSURE: 120 MMHG | WEIGHT: 216 LBS | HEIGHT: 60 IN | TEMPERATURE: 97 F | DIASTOLIC BLOOD PRESSURE: 82 MMHG | HEART RATE: 91 BPM | BODY MASS INDEX: 42.41 KG/M2

## 2024-10-17 DIAGNOSIS — R63.5 ABNORMAL WEIGHT GAIN: Primary | ICD-10-CM

## 2024-10-17 DIAGNOSIS — Z01.818 PRE-OP TESTING: ICD-10-CM

## 2024-10-17 DIAGNOSIS — E66.01 OBESITY, CLASS III, BMI 40-49.9 (MORBID OBESITY) (HCC): Primary | ICD-10-CM

## 2024-10-17 DIAGNOSIS — R35.0 URINARY FREQUENCY: ICD-10-CM

## 2024-10-17 LAB
ABO GROUP BLD: NORMAL
ANION GAP SERPL CALCULATED.3IONS-SCNC: 6 MMOL/L (ref 4–13)
ATRIAL RATE: 84 BPM
BASOPHILS # BLD AUTO: 0.03 THOUSANDS/ΜL (ref 0–0.1)
BASOPHILS NFR BLD AUTO: 0 % (ref 0–1)
BLD GP AB SCN SERPL QL: NEGATIVE
BUN SERPL-MCNC: 10 MG/DL (ref 5–25)
CALCIUM SERPL-MCNC: 8.6 MG/DL (ref 8.4–10.2)
CHLORIDE SERPL-SCNC: 105 MMOL/L (ref 96–108)
CO2 SERPL-SCNC: 26 MMOL/L (ref 21–32)
CREAT SERPL-MCNC: 0.68 MG/DL (ref 0.6–1.3)
EOSINOPHIL # BLD AUTO: 0.11 THOUSAND/ΜL (ref 0–0.61)
EOSINOPHIL NFR BLD AUTO: 2 % (ref 0–6)
ERYTHROCYTE [DISTWIDTH] IN BLOOD BY AUTOMATED COUNT: 14.2 % (ref 11.6–15.1)
EST. AVERAGE GLUCOSE BLD GHB EST-MCNC: 120 MG/DL
GFR SERPL CREATININE-BSD FRML MDRD: 112 ML/MIN/1.73SQ M
GLUCOSE P FAST SERPL-MCNC: 97 MG/DL (ref 65–99)
HBA1C MFR BLD: 5.8 %
HCT VFR BLD AUTO: 38.1 % (ref 34.8–46.1)
HGB BLD-MCNC: 11.9 G/DL (ref 11.5–15.4)
IMM GRANULOCYTES # BLD AUTO: 0.02 THOUSAND/UL (ref 0–0.2)
IMM GRANULOCYTES NFR BLD AUTO: 0 % (ref 0–2)
LYMPHOCYTES # BLD AUTO: 2.1 THOUSANDS/ΜL (ref 0.6–4.47)
LYMPHOCYTES NFR BLD AUTO: 32 % (ref 14–44)
MCH RBC QN AUTO: 27.2 PG (ref 26.8–34.3)
MCHC RBC AUTO-ENTMCNC: 31.2 G/DL (ref 31.4–37.4)
MCV RBC AUTO: 87 FL (ref 82–98)
MONOCYTES # BLD AUTO: 0.46 THOUSAND/ΜL (ref 0.17–1.22)
MONOCYTES NFR BLD AUTO: 7 % (ref 4–12)
NEUTROPHILS # BLD AUTO: 3.95 THOUSANDS/ΜL (ref 1.85–7.62)
NEUTS SEG NFR BLD AUTO: 59 % (ref 43–75)
NRBC BLD AUTO-RTO: 0 /100 WBCS
P AXIS: 13 DEGREES
PLATELET # BLD AUTO: 362 THOUSANDS/UL (ref 149–390)
PMV BLD AUTO: 9.4 FL (ref 8.9–12.7)
POTASSIUM SERPL-SCNC: 3.9 MMOL/L (ref 3.5–5.3)
PR INTERVAL: 128 MS
QRS AXIS: 2 DEGREES
QRSD INTERVAL: 78 MS
QT INTERVAL: 382 MS
QTC INTERVAL: 451 MS
RBC # BLD AUTO: 4.38 MILLION/UL (ref 3.81–5.12)
RH BLD: POSITIVE
SODIUM SERPL-SCNC: 137 MMOL/L (ref 135–147)
SPECIMEN EXPIRATION DATE: NORMAL
T WAVE AXIS: 12 DEGREES
VENTRICULAR RATE: 84 BPM
WBC # BLD AUTO: 6.67 THOUSAND/UL (ref 4.31–10.16)

## 2024-10-17 PROCEDURE — RECHECK

## 2024-10-17 PROCEDURE — 86900 BLOOD TYPING SEROLOGIC ABO: CPT

## 2024-10-17 PROCEDURE — 80048 BASIC METABOLIC PNL TOTAL CA: CPT

## 2024-10-17 PROCEDURE — 87086 URINE CULTURE/COLONY COUNT: CPT

## 2024-10-17 PROCEDURE — 86901 BLOOD TYPING SEROLOGIC RH(D): CPT

## 2024-10-17 PROCEDURE — 86850 RBC ANTIBODY SCREEN: CPT

## 2024-10-17 PROCEDURE — 93010 ELECTROCARDIOGRAM REPORT: CPT

## 2024-10-17 PROCEDURE — 83036 HEMOGLOBIN GLYCOSYLATED A1C: CPT

## 2024-10-17 PROCEDURE — 85025 COMPLETE CBC W/AUTO DIFF WBC: CPT

## 2024-10-17 NOTE — PROGRESS NOTES
Patient last visit weight: 220lb  Patient current visit weight:216lb    If you are taking phentermine or other oral weight loss medications, are you experiencing any of the following symptoms:  Headache:   Blurred Vision:   Chest Pain:   Palpitations:  Insomnia:   SPECIFY ORAL MEDICATION AND DOSAGE:     If you are taking an injectable medication,  are you experiencing any of the following symptoms:  Bloating:  NO  Nausea: NO  Vomiting:  NO  Constipation:  NO  Diarrhea: NO  SPECIFY INJECTABLE MEDICATION AND CURRENT DOSAGE:Wegovy 0.5mg  Pt asking dose increase , send to [facility], she also stated she feeling tired on the medication.      Vitals:    Is BP less than 100/60? NO  Is BP greater than 140/90? NO  Is HR greater than 100? NO  **If yes to any of the above, have patient relax and repeat in 5-10 minutes**    Repeat values:    Is BP less than 100/60?  Is BP greater than 140/90?  Is HR greater than 100?  **If values remain outside of ranges above, please consult provider for next steps**

## 2024-10-17 NOTE — TELEPHONE ENCOUNTER
----- Message from Eben Betts PA-C sent at 10/17/2024 12:40 PM EDT -----  I sent in the wegovy. I would track calories and make sure she is getting enough.  She would want to be between 900-1200. Also make sure to get at least 60mg protein.  Could try a multivitamin too to see if that helps  ----- Message -----  From: Lou Brewer MA  Sent: 10/17/2024   8:02 AM EDT  To: Eben Betts PA-C

## 2024-10-19 LAB — BACTERIA UR CULT: NORMAL

## 2024-10-21 NOTE — TELEPHONE ENCOUNTER
Pt calling back regarding results of EMB biopsy as well as lab work she's recently obtained. Pt nervous regarding upcoming surgery. RN advised will route to provider. Provided emotional support. Pt thankful. No further questions.

## 2024-10-23 NOTE — PRE-PROCEDURE INSTRUCTIONS
Pre-Surgery Instructions:   Medication Instructions    albuterol (2.5 mg/3 mL) 0.083 % nebulizer solution Uses PRN- OK to take day of surgery    celecoxib (CeleBREX) 200 mg capsule Stop taking 3 days prior to surgery.    cyclobenzaprine (FLEXERIL) 5 mg tablet Take night before surgery PRN    Diclofenac Sodium (VOLTAREN) 1 % Hold day of surgery.    ergocalciferol (VITAMIN D2) 50,000 units Hold day of surgery.    famotidine (PEPCID) 20 mg tablet Uses PRN- OK to take day of surgery    fexofenadine-pseudoephedrine (Allegra-D Allergy & Congestion) 180-240 MG per 24 hr tablet Hold day of surgery.    Fluticasone Furoate-Vilanterol 100-25 mcg/actuation inhaler Take day of surgery.    gabapentin (Neurontin) 100 mg capsule Uses PRN- OK to take day of surgery    ibuprofen (MOTRIN) 600 mg tablet Stop taking 7 days prior to surgery.    levalbuterol (Xopenex HFA) 45 mcg/act inhaler Uses PRN- OK to take day of surgery    omeprazole (PriLOSEC) 40 MG capsule Take day of surgery.    Semaglutide-Weight Management (WEGOVY) 0.5 MG/0.5ML Stop taking 7 days prior to surgery.  10/14/24    Eras- 1 preop drink with instructions.    Medication instructions for day surgery reviewed. Please use only a sip of water to take your instructed medications. Avoid all over the counter vitamins, supplements and NSAIDS for one week prior to surgery per anesthesia guidelines. Tylenol is ok to take as needed.     You will receive a call one business day prior to surgery with an arrival time and hospital directions. If your surgery is scheduled on a Monday, the hospital will be calling you on the Friday prior to your surgery. If you have not heard from anyone by 8pm, please call the hospital supervisor through the hospital  at [phone]. ([place] [phone] or [place] [phone]).    Do not eat or drink anything after midnight the night before your surgery, including candy, mints, lifesavers, or chewing gum. Do not drink alcohol 24hrs  before your surgery. Try not to smoke at least 24hrs before your surgery.       Follow the pre surgery showering instructions as listed in the “My Surgical Experience Booklet” or otherwise provided by your surgeon's office. Do not use a blade to shave the surgical area 1 week before surgery. It is okay to use a clean electric clippers up to 24 hours before surgery. Do not apply any lotions, creams, including makeup, cologne, deodorant, or perfumes after showering on the day of your surgery. Do not use dry shampoo, hair spray, hair gel, or any type of hair products.     No contact lenses, eye make-up, or artificial eyelashes. Remove nail polish, including gel polish, and any artificial, gel, or acrylic nails if possible. Remove all jewelry including rings and body piercing jewelry.     Wear causal clothing that is easy to take on and off. Consider your type of surgery.    Keep any valuables, jewelry, piercings at home. Please bring any specially ordered equipment (sling, braces) if indicated.    Arrange for a responsible person to drive you to and from the hospital on the day of your surgery. Please confirm the visitor policy for the day of your procedure when you receive your phone call with an arrival time.     Call the surgeon's office with any new illnesses, exposures, or additional questions prior to surgery.    Please reference your “My Surgical Experience Booklet” for additional information to prepare for your upcoming surgery.

## 2024-10-29 ENCOUNTER — ANESTHESIA EVENT (OUTPATIENT)
Dept: PERIOP | Facility: HOSPITAL | Age: 39
End: 2024-10-29
Payer: COMMERCIAL

## 2024-10-30 ENCOUNTER — HOSPITAL ENCOUNTER (OUTPATIENT)
Facility: HOSPITAL | Age: 39
Setting detail: OUTPATIENT SURGERY
Discharge: HOME | End: 2024-10-31
Attending: OBSTETRICS & GYNECOLOGY | Admitting: OBSTETRICS & GYNECOLOGY
Payer: COMMERCIAL

## 2024-10-30 ENCOUNTER — ANESTHESIA (OUTPATIENT)
Dept: PERIOP | Facility: HOSPITAL | Age: 39
End: 2024-10-30
Payer: COMMERCIAL

## 2024-10-30 DIAGNOSIS — J45.30 MILD PERSISTENT ASTHMA WITHOUT COMPLICATION: ICD-10-CM

## 2024-10-30 DIAGNOSIS — Z90.710 S/P LAPAROSCOPIC HYSTERECTOMY: Primary | ICD-10-CM

## 2024-10-30 DIAGNOSIS — N80.03 ADENOMYOSIS: ICD-10-CM

## 2024-10-30 DIAGNOSIS — R00.0 TACHYCARDIA: ICD-10-CM

## 2024-10-30 DIAGNOSIS — R06.9 RESPIRATORY ABNORMALITY: ICD-10-CM

## 2024-10-30 DIAGNOSIS — R06.02 SOB (SHORTNESS OF BREATH): ICD-10-CM

## 2024-10-30 LAB
ABO GROUP BLD: NORMAL
BLD GP AB SCN SERPL QL: NEGATIVE
EXT PREGNANCY TEST URINE: NEGATIVE
EXT. CONTROL: NORMAL
RH BLD: POSITIVE
SPECIMEN EXPIRATION DATE: NORMAL

## 2024-10-30 PROCEDURE — 85025 COMPLETE CBC W/AUTO DIFF WBC: CPT

## 2024-10-30 PROCEDURE — 86900 BLOOD TYPING SEROLOGIC ABO: CPT | Performed by: OBSTETRICS & GYNECOLOGY

## 2024-10-30 PROCEDURE — 81025 URINE PREGNANCY TEST: CPT | Performed by: OBSTETRICS & GYNECOLOGY

## 2024-10-30 PROCEDURE — 58571 TLH W/T/O 250 G OR LESS: CPT | Performed by: OBSTETRICS & GYNECOLOGY

## 2024-10-30 PROCEDURE — NC001 PR NO CHARGE: Performed by: OBSTETRICS & GYNECOLOGY

## 2024-10-30 PROCEDURE — 86901 BLOOD TYPING SEROLOGIC RH(D): CPT | Performed by: OBSTETRICS & GYNECOLOGY

## 2024-10-30 PROCEDURE — 86850 RBC ANTIBODY SCREEN: CPT | Performed by: OBSTETRICS & GYNECOLOGY

## 2024-10-30 PROCEDURE — 88307 TISSUE EXAM BY PATHOLOGIST: CPT | Performed by: PATHOLOGY

## 2024-10-30 RX ORDER — DEXAMETHASONE SODIUM PHOSPHATE 10 MG/ML
INJECTION, SOLUTION INTRAMUSCULAR; INTRAVENOUS AS NEEDED
Status: DISCONTINUED | OUTPATIENT
Start: 2024-10-30 | End: 2024-10-30

## 2024-10-30 RX ORDER — ONDANSETRON 2 MG/ML
4 INJECTION INTRAMUSCULAR; INTRAVENOUS EVERY 6 HOURS PRN
Status: DISCONTINUED | OUTPATIENT
Start: 2024-10-30 | End: 2024-10-30 | Stop reason: SDUPTHER

## 2024-10-30 RX ORDER — FENTANYL CITRATE 50 UG/ML
INJECTION, SOLUTION INTRAMUSCULAR; INTRAVENOUS AS NEEDED
Status: DISCONTINUED | OUTPATIENT
Start: 2024-10-30 | End: 2024-10-30

## 2024-10-30 RX ORDER — BUPIVACAINE HYDROCHLORIDE 5 MG/ML
INJECTION, SOLUTION EPIDURAL; INTRACAUDAL AS NEEDED
Status: DISCONTINUED | OUTPATIENT
Start: 2024-10-30 | End: 2024-10-30 | Stop reason: HOSPADM

## 2024-10-30 RX ORDER — OXYCODONE HYDROCHLORIDE 10 MG/1
10 TABLET ORAL EVERY 4 HOURS PRN
Status: DISCONTINUED | OUTPATIENT
Start: 2024-10-30 | End: 2024-10-31 | Stop reason: HOSPADM

## 2024-10-30 RX ORDER — IBUPROFEN 600 MG/1
600 TABLET, FILM COATED ORAL EVERY 6 HOURS SCHEDULED
Status: DISCONTINUED | OUTPATIENT
Start: 2024-10-30 | End: 2024-10-31 | Stop reason: HOSPADM

## 2024-10-30 RX ORDER — ALBUTEROL SULFATE 90 UG/1
INHALANT RESPIRATORY (INHALATION) AS NEEDED
Status: DISCONTINUED | OUTPATIENT
Start: 2024-10-30 | End: 2024-10-30

## 2024-10-30 RX ORDER — IBUPROFEN 600 MG/1
600 TABLET, FILM COATED ORAL EVERY 6 HOURS PRN
Status: DISCONTINUED | OUTPATIENT
Start: 2024-10-30 | End: 2024-10-31 | Stop reason: HOSPADM

## 2024-10-30 RX ORDER — ACETAMINOPHEN 500 MG
1000 TABLET ORAL EVERY 8 HOURS PRN
Start: 2024-10-30

## 2024-10-30 RX ORDER — HYDROMORPHONE HCL/PF 1 MG/ML
0.5 SYRINGE (ML) INJECTION
Status: DISCONTINUED | OUTPATIENT
Start: 2024-10-30 | End: 2024-10-30 | Stop reason: HOSPADM

## 2024-10-30 RX ORDER — OXYCODONE HYDROCHLORIDE 10 MG/1
10 TABLET ORAL EVERY 4 HOURS PRN
Status: DISCONTINUED | OUTPATIENT
Start: 2024-10-30 | End: 2024-10-30 | Stop reason: SDUPTHER

## 2024-10-30 RX ORDER — KETOROLAC TROMETHAMINE 30 MG/ML
INJECTION, SOLUTION INTRAMUSCULAR; INTRAVENOUS AS NEEDED
Status: DISCONTINUED | OUTPATIENT
Start: 2024-10-30 | End: 2024-10-30

## 2024-10-30 RX ORDER — PANTOPRAZOLE SODIUM 40 MG/1
40 TABLET, DELAYED RELEASE ORAL
Status: DISCONTINUED | OUTPATIENT
Start: 2024-10-31 | End: 2024-10-31 | Stop reason: HOSPADM

## 2024-10-30 RX ORDER — OXYCODONE HYDROCHLORIDE 5 MG/1
5 TABLET ORAL EVERY 4 HOURS PRN
Status: DISCONTINUED | OUTPATIENT
Start: 2024-10-30 | End: 2024-10-31 | Stop reason: HOSPADM

## 2024-10-30 RX ORDER — CEFAZOLIN SODIUM 2 G/50ML
2000 SOLUTION INTRAVENOUS ONCE
Status: DISCONTINUED | OUTPATIENT
Start: 2024-10-30 | End: 2024-10-31 | Stop reason: HOSPADM

## 2024-10-30 RX ORDER — OXYCODONE HYDROCHLORIDE 5 MG/1
5 TABLET ORAL EVERY 4 HOURS PRN
Status: DISCONTINUED | OUTPATIENT
Start: 2024-10-30 | End: 2024-10-30 | Stop reason: SDUPTHER

## 2024-10-30 RX ORDER — IBUPROFEN 600 MG/1
600 TABLET, FILM COATED ORAL EVERY 6 HOURS PRN
Qty: 50 TABLET | Refills: 1 | Status: SHIPPED | OUTPATIENT
Start: 2024-10-30

## 2024-10-30 RX ORDER — PROPOFOL 10 MG/ML
INJECTION, EMULSION INTRAVENOUS AS NEEDED
Status: DISCONTINUED | OUTPATIENT
Start: 2024-10-30 | End: 2024-10-30

## 2024-10-30 RX ORDER — ALBUTEROL SULFATE 0.83 MG/ML
2.5 SOLUTION RESPIRATORY (INHALATION) ONCE
Status: COMPLETED | OUTPATIENT
Start: 2024-10-30 | End: 2024-10-30

## 2024-10-30 RX ORDER — FENTANYL CITRATE/PF 50 MCG/ML
25 SYRINGE (ML) INJECTION
Status: DISCONTINUED | OUTPATIENT
Start: 2024-10-30 | End: 2024-10-30 | Stop reason: HOSPADM

## 2024-10-30 RX ORDER — ROCURONIUM BROMIDE 10 MG/ML
INJECTION, SOLUTION INTRAVENOUS AS NEEDED
Status: DISCONTINUED | OUTPATIENT
Start: 2024-10-30 | End: 2024-10-30

## 2024-10-30 RX ORDER — LIDOCAINE HYDROCHLORIDE 10 MG/ML
INJECTION, SOLUTION EPIDURAL; INFILTRATION; INTRACAUDAL; PERINEURAL AS NEEDED
Status: DISCONTINUED | OUTPATIENT
Start: 2024-10-30 | End: 2024-10-30

## 2024-10-30 RX ORDER — CEFAZOLIN SODIUM 1 G/3ML
INJECTION, POWDER, FOR SOLUTION INTRAMUSCULAR; INTRAVENOUS AS NEEDED
Status: DISCONTINUED | OUTPATIENT
Start: 2024-10-30 | End: 2024-10-30

## 2024-10-30 RX ORDER — ONDANSETRON 2 MG/ML
4 INJECTION INTRAMUSCULAR; INTRAVENOUS ONCE AS NEEDED
Status: COMPLETED | OUTPATIENT
Start: 2024-10-30 | End: 2024-10-30

## 2024-10-30 RX ORDER — HYDROMORPHONE HYDROCHLORIDE 1 MG/ML
INJECTION, SOLUTION INTRAMUSCULAR; INTRAVENOUS; SUBCUTANEOUS AS NEEDED
Status: DISCONTINUED | OUTPATIENT
Start: 2024-10-30 | End: 2024-10-30

## 2024-10-30 RX ORDER — SODIUM CHLORIDE 9 MG/ML
125 INJECTION, SOLUTION INTRAVENOUS CONTINUOUS
Status: DISCONTINUED | OUTPATIENT
Start: 2024-10-30 | End: 2024-10-30

## 2024-10-30 RX ORDER — MIDAZOLAM HYDROCHLORIDE 2 MG/2ML
INJECTION, SOLUTION INTRAMUSCULAR; INTRAVENOUS AS NEEDED
Status: DISCONTINUED | OUTPATIENT
Start: 2024-10-30 | End: 2024-10-30

## 2024-10-30 RX ORDER — CELECOXIB 200 MG/1
200 CAPSULE ORAL 2 TIMES DAILY
Status: DISCONTINUED | OUTPATIENT
Start: 2024-10-30 | End: 2024-10-31 | Stop reason: HOSPADM

## 2024-10-30 RX ORDER — ACETAMINOPHEN 325 MG/1
975 TABLET ORAL EVERY 6 HOURS PRN
Status: DISCONTINUED | OUTPATIENT
Start: 2024-10-30 | End: 2024-10-31 | Stop reason: HOSPADM

## 2024-10-30 RX ORDER — ONDANSETRON 2 MG/ML
INJECTION INTRAMUSCULAR; INTRAVENOUS AS NEEDED
Status: DISCONTINUED | OUTPATIENT
Start: 2024-10-30 | End: 2024-10-30

## 2024-10-30 RX ORDER — GLYCOPYRROLATE 0.2 MG/ML
INJECTION INTRAMUSCULAR; INTRAVENOUS AS NEEDED
Status: DISCONTINUED | OUTPATIENT
Start: 2024-10-30 | End: 2024-10-30

## 2024-10-30 RX ORDER — ONDANSETRON 2 MG/ML
4 INJECTION INTRAMUSCULAR; INTRAVENOUS EVERY 6 HOURS PRN
Status: DISCONTINUED | OUTPATIENT
Start: 2024-10-30 | End: 2024-10-31 | Stop reason: HOSPADM

## 2024-10-30 RX ORDER — OXYCODONE HYDROCHLORIDE 5 MG/1
5 TABLET ORAL EVERY 6 HOURS PRN
Qty: 15 TABLET | Refills: 0 | Status: SHIPPED | OUTPATIENT
Start: 2024-10-30

## 2024-10-30 RX ORDER — MAGNESIUM HYDROXIDE 1200 MG/15ML
LIQUID ORAL AS NEEDED
Status: DISCONTINUED | OUTPATIENT
Start: 2024-10-30 | End: 2024-10-30 | Stop reason: HOSPADM

## 2024-10-30 RX ORDER — PROMETHAZINE HYDROCHLORIDE 25 MG/ML
12.5 INJECTION, SOLUTION INTRAMUSCULAR; INTRAVENOUS ONCE AS NEEDED
Status: COMPLETED | OUTPATIENT
Start: 2024-10-30 | End: 2024-10-30

## 2024-10-30 RX ORDER — ACETAMINOPHEN 325 MG/1
650 TABLET ORAL EVERY 6 HOURS SCHEDULED
Status: DISCONTINUED | OUTPATIENT
Start: 2024-10-30 | End: 2024-10-31 | Stop reason: HOSPADM

## 2024-10-30 RX ADMIN — DEXMEDETOMIDINE 4 MCG: 100 INJECTION, SOLUTION INTRAVENOUS at 13:25

## 2024-10-30 RX ADMIN — ACETAMINOPHEN 650 MG: 325 TABLET, FILM COATED ORAL at 23:48

## 2024-10-30 RX ADMIN — HYDROMORPHONE HYDROCHLORIDE 0.5 MG: 1 INJECTION, SOLUTION INTRAMUSCULAR; INTRAVENOUS; SUBCUTANEOUS at 12:58

## 2024-10-30 RX ADMIN — MIDAZOLAM 2 MG: 1 INJECTION INTRAMUSCULAR; INTRAVENOUS at 11:56

## 2024-10-30 RX ADMIN — ROCURONIUM 50 MG: 50 INJECTION, SOLUTION INTRAVENOUS at 12:01

## 2024-10-30 RX ADMIN — ALBUTEROL SULFATE 2.5 MG: 2.5 SOLUTION RESPIRATORY (INHALATION) at 14:29

## 2024-10-30 RX ADMIN — ACETAMINOPHEN 650 MG: 325 TABLET, FILM COATED ORAL at 17:24

## 2024-10-30 RX ADMIN — GLYCOPYRROLATE 0.2 MG: 0.2 INJECTION, SOLUTION INTRAMUSCULAR; INTRAVENOUS at 11:56

## 2024-10-30 RX ADMIN — ALBUTEROL SULFATE 2 PUFF: 90 AEROSOL, METERED RESPIRATORY (INHALATION) at 13:48

## 2024-10-30 RX ADMIN — HYDROMORPHONE HYDROCHLORIDE 0.5 MG: 1 INJECTION, SOLUTION INTRAMUSCULAR; INTRAVENOUS; SUBCUTANEOUS at 12:49

## 2024-10-30 RX ADMIN — FENTANYL CITRATE 100 MCG: 50 INJECTION INTRAMUSCULAR; INTRAVENOUS at 12:01

## 2024-10-30 RX ADMIN — FENTANYL CITRATE 50 MCG: 50 INJECTION INTRAMUSCULAR; INTRAVENOUS at 13:51

## 2024-10-30 RX ADMIN — PROMETHAZINE HYDROCHLORIDE 12.5 MG: 25 INJECTION INTRAMUSCULAR; INTRAVENOUS at 15:26

## 2024-10-30 RX ADMIN — SODIUM CHLORIDE 125 ML/HR: 0.9 INJECTION, SOLUTION INTRAVENOUS at 10:45

## 2024-10-30 RX ADMIN — ONDANSETRON 4 MG: 2 INJECTION INTRAMUSCULAR; INTRAVENOUS at 14:50

## 2024-10-30 RX ADMIN — PROPOFOL 200 MG: 10 INJECTION, EMULSION INTRAVENOUS at 12:01

## 2024-10-30 RX ADMIN — DEXMEDETOMIDINE 4 MCG: 100 INJECTION, SOLUTION INTRAVENOUS at 13:13

## 2024-10-30 RX ADMIN — ROCURONIUM 20 MG: 50 INJECTION, SOLUTION INTRAVENOUS at 12:32

## 2024-10-30 RX ADMIN — ROCURONIUM 10 MG: 50 INJECTION, SOLUTION INTRAVENOUS at 13:13

## 2024-10-30 RX ADMIN — ONDANSETRON 4 MG: 2 INJECTION INTRAMUSCULAR; INTRAVENOUS at 13:22

## 2024-10-30 RX ADMIN — CELECOXIB 200 MG: 200 CAPSULE ORAL at 21:29

## 2024-10-30 RX ADMIN — CEFAZOLIN 2000 MG: 1 INJECTION, POWDER, FOR SOLUTION INTRAMUSCULAR; INTRAVENOUS; PARENTERAL at 12:10

## 2024-10-30 RX ADMIN — IBUPROFEN 600 MG: 600 TABLET, FILM COATED ORAL at 17:25

## 2024-10-30 RX ADMIN — IBUPROFEN 600 MG: 600 TABLET, FILM COATED ORAL at 23:49

## 2024-10-30 RX ADMIN — KETOROLAC TROMETHAMINE 30 MG: 30 INJECTION, SOLUTION INTRAMUSCULAR; INTRAVENOUS at 13:42

## 2024-10-30 RX ADMIN — LIDOCAINE HYDROCHLORIDE 100 MG: 10 INJECTION, SOLUTION EPIDURAL; INFILTRATION; INTRACAUDAL; PERINEURAL at 12:01

## 2024-10-30 RX ADMIN — DEXMEDETOMIDINE 4 MCG: 100 INJECTION, SOLUTION INTRAVENOUS at 12:55

## 2024-10-30 RX ADMIN — SUGAMMADEX 200 MG: 100 INJECTION, SOLUTION INTRAVENOUS at 13:49

## 2024-10-30 RX ADMIN — DEXAMETHASONE SODIUM PHOSPHATE 10 MG: 10 INJECTION INTRAMUSCULAR; INTRAVENOUS at 12:04

## 2024-10-30 NOTE — H&P
History & Physical - OB/GYN   Renee Wall 38 y.o. female MRN: [id number]  Unit/Bed#: OR [place] Encounter: [id number]      Chief complaint:  preop    HPI:  Ms Wall is a 38 y.o.  presenting for planned TLH, BS, cysto for adenomyosis. She is without complaint today. Denies fever/chills, cp, sob, n/v/d.       Active Problems:  Patient Active Problem List   Diagnosis    Asthma, mild persistent    BPPV (benign paroxysmal positional vertigo)    Mild single current episode of major depressive disorder (HCC)    Seasonal allergies    History of anxiety disorder    Primary insomnia    Chronic arthralgias of knees and hips    Myalgia    Chronic bilateral low back pain without sciatica    Gastroesophageal reflux disease without esophagitis    Obesity, Class III, BMI 40-49.9 (morbid obesity) (HCC)    Left breast mass    Abnormal finding on breast imaging    Family history of breast cancer    Family history of ovarian cancer    Bilateral hand pain    Right ankle sprain    Avulsion fracture of navicular bone of right foot    Sprain of anterior talofibular ligament of right ankle, subsequent encounter    Closed avulsion fracture of navicular bone of right foot with routine healing, subsequent encounter    Prediabetes    Adenomyosis       PMH:  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     Depression     GERD (gastroesophageal reflux disease)     Obesity     Scoliosis     Visual impairment        PSH:  Past Surgical History:   Procedure Laterality Date    DENTAL SURGERY      teeth    HERNIA REPAIR         Social Hx:  Social History     Tobacco Use    Smoking status: Former     Current packs/day: 0.00     Types: Cigarettes     Quit date:      Years since quittin.8    Smokeless tobacco: Never   Vaping Use    Vaping status: Never Used   Substance Use Topics    Alcohol use: Not Currently     Comment: occasionally    Drug use: Never       OB Hx:  OB  History    Para Term  AB Living   4 2 2 0 2 2   SAB IAB Ectopic Multiple Live Births   2 0 0 0 2      # Outcome Date GA Lbr Aristides/2nd Weight Sex Type Anes PTL Lv   4 Term 2009    F Vag-Spont   FREDERICK   3 SAB 2008           2 SAB 2005           1 Term 2004    M Vag-Spont   FREDERICK      Obstetric Comments   Menarche-9   Age at first pregnancy-18   depro-1yr   bcp-5yrs       Meds:  No current facility-administered medications on file prior to encounter.     Current Outpatient Medications on File Prior to Encounter   Medication Sig Dispense Refill    celecoxib (CeleBREX) 200 mg capsule Take 1 capsule (200 mg total) by mouth 2 (two) times a day 180 capsule 1    Diclofenac Sodium (VOLTAREN) 1 % Apply 2 g topically 4 (four) times a day 100 g 6    ergocalciferol (VITAMIN D2) 50,000 units Take 1 capsule (50,000 Units total) by mouth once a week 12 capsule 1    ibuprofen (MOTRIN) 600 mg tablet TAKE 1 TABLET EVERY 6 HOURS FOR FIRST 48 HOURS THEN AS NEEDED FOR PAIN      levalbuterol (Xopenex HFA) 45 mcg/act inhaler Inhale 1-2 puffs every 4 (four) hours as needed for wheezing or shortness of breath 15 g 0    omeprazole (PriLOSEC) 40 MG capsule Take 1 capsule (40 mg total) by mouth daily 90 capsule 1    albuterol (2.5 mg/3 mL) 0.083 % nebulizer solution Take 3 mL (2.5 mg total) by nebulization every 6 (six) hours as needed for wheezing or shortness of breath 75 mL 3    cyclobenzaprine (FLEXERIL) 5 mg tablet Take 1 tablet (5 mg total) by mouth daily at bedtime (Patient taking differently: Take 5 mg by mouth daily at bedtime PRN) 30 tablet 6    famotidine (PEPCID) 20 mg tablet Take 1 tablet (20 mg total) by mouth 2 (two) times a day (Patient taking differently: Take 20 mg by mouth 2 (two) times a day PRN) 30 tablet 0    fexofenadine-pseudoephedrine (Allegra-D Allergy & Congestion) 180-240 MG per 24 hr tablet Take 1 tablet by mouth daily 90 tablet 1    Fluticasone Furoate-Vilanterol 100-25 mcg/actuation inhaler Inhale 1 puff  daily Rinse mouth after use. 60 blister 5    gabapentin (Neurontin) 100 mg capsule Take 1 capsule (100 mg total) by mouth 3 (three) times a day During a pain flare-up (Patient taking differently: Take 100 mg by mouth 3 (three) times a day During a pain flare-up PRN) 90 capsule 6       Allergies:  No Known Allergies      Physical Exam:  /54   Pulse 93   Temp 97.8 °F (36.6 °C) (Temporal)   Resp 17   Wt 100 kg (221 lb 5.5 oz)   LMP 2024 (Approximate)   SpO2 97%   BMI 43.23 kg/m²     Physical Exam  Constitutional:       General: She is not in acute distress.     Appearance: Normal appearance. She is not ill-appearing, toxic-appearing or diaphoretic.   Eyes:      General: No scleral icterus.        Right eye: No discharge.         Left eye: No discharge.      Conjunctiva/sclera: Conjunctivae normal.   Cardiovascular:      Rate and Rhythm: Normal rate and regular rhythm.      Heart sounds: Normal heart sounds. No murmur heard.     No friction rub.   Pulmonary:      Effort: Pulmonary effort is normal. No respiratory distress.      Breath sounds: Normal breath sounds. No wheezing or rales.   Abdominal:      General: There is no distension.      Palpations: There is no mass.      Tenderness: There is no abdominal tenderness. There is no guarding or rebound.      Hernia: No hernia is present.   Musculoskeletal:         General: No swelling.   Skin:     General: Skin is warm and dry.      Coloration: Skin is not jaundiced or pale.      Findings: No bruising or erythema.   Neurological:      Mental Status: She is alert.   Psychiatric:         Mood and Affect: Mood normal.         Behavior: Behavior normal.         Thought Content: Thought content normal.         Judgment: Judgment normal.           Assessment:   38 y.o.  presenting for planned TLH, BS, cysto for adenomyosis.     Plan:   1. To OR as planned

## 2024-10-30 NOTE — ANESTHESIA POSTPROCEDURE EVALUATION
Post-Op Assessment Note    CV Status:  Stable  Pain Score: 0    Pain management: adequate       Mental Status:  Alert and awake   Hydration Status:  Euvolemic   PONV Controlled:  Controlled   Airway Patency:  Patent  Airway: intubated     Post Op Vitals Reviewed: Yes    No anethesia notable event occurred.    Staff: CRNA           Last Filed PACU Vitals:  Vitals Value Taken Time   Temp 96.9 °F (36.1 °C) 10/30/24 1407   Pulse 88 10/30/24 1409   /65 10/30/24 1407   Resp 15 10/30/24 1407   SpO2 95 % 10/30/24 1409   Vitals shown include unfiled device data.    Modified Katlyn:  No data recorded

## 2024-10-30 NOTE — PLAN OF CARE
Problem: PAIN - ADULT  Goal: Verbalizes/displays adequate comfort level or baseline comfort level  Description: Interventions:  - Encourage patient to monitor pain and request assistance  - Assess pain using appropriate pain scale  - Administer analgesics based on type and severity of pain and evaluate response  - Implement non-pharmacological measures as appropriate and evaluate response  - Consider cultural and social influences on pain and pain management  - Notify physician/advanced practitioner if interventions unsuccessful or patient reports new pain  Outcome: Progressing     Problem: INFECTION - ADULT  Goal: Absence or prevention of progression during hospitalization  Description: INTERVENTIONS:  - Assess and monitor for signs and symptoms of infection  - Monitor lab/diagnostic results  - Monitor all insertion sites, i.e. indwelling lines, tubes, and drains  - Monitor endotracheal if appropriate and nasal secretions for changes in amount and color  - [place] appropriate cooling/warming therapies per order  - Administer medications as ordered  - Instruct and encourage patient and family to use good hand hygiene technique  - Identify and instruct in appropriate isolation precautions for identified infection/condition  Outcome: Progressing     Problem: SAFETY ADULT  Goal: Patient will remain free of falls  Description: INTERVENTIONS:  - Educate patient/family on patient safety including physical limitations  - Instruct patient to call for assistance with activity   - Consult OT/PT to assist with strengthening/mobility   - Keep Call bell within reach  - Keep bed low and locked with side rails adjusted as appropriate  - Keep care items and personal belongings within reach  - Initiate and maintain comfort rounds  - Make Fall Risk Sign visible to staff  - Offer Toileting every 2 Hours, in advance of need      - Apply yellow socks and bracelet for high fall risk patients  - Consider moving patient to room near  nurses station  Outcome: Progressing     Problem: DISCHARGE PLANNING  Goal: Discharge to home or other facility with appropriate resources  Description: INTERVENTIONS:  - Identify barriers to discharge w/patient and caregiver  - Arrange for needed discharge resources and transportation as appropriate  - Identify discharge learning needs (meds, wound care, etc.)  - Arrange for interpretive services to assist at discharge as needed  - Refer to Case Management Department for coordinating discharge planning if the patient needs post-hospital services based on physician/advanced practitioner order or complex needs related to functional status, cognitive ability, or social support system  Outcome: Progressing

## 2024-10-30 NOTE — PROGRESS NOTES
Post-op Note - OB/GYN   Renee Wall 38 y.o. female MRN: [id number]  Unit/Bed#: E5 -01 Encounter: [id number]    Assessment/Plan:  38 y.o. s/p TLH/BS for adenomyosis, admitted post operatively for respiratory management.    Plan:  - Procedure uncomplicated with minimal blood loss  - Pain controlled on current regimen  - CBC and BMP ordered for AM  - Regular diet  - Respiratory protocol ordered, O2 saturation 93% on room air    Anticipate discharge home tomorrow      Subjective:     Pain: no  Tolerating PO: yes  Voiding: yes  Flatus: no  BM: no  Ambulating: yes  Chest pain: no  Shortness of breath: yes, improved with nebulizer  Leg pain: no      Objective:     Vitals: Blood pressure 122/84, pulse (!) 108, temperature 98.2 °F (36.8 °C), resp. rate 18, weight 100 kg (221 lb 5.5 oz), last menstrual period 09/29/2024, SpO2 91%, not currently breastfeeding.    Physical Exam  Vitals reviewed.   HENT:      Mouth/Throat:      Mouth: Mucous membranes are moist.      Pharynx: Oropharynx is clear.   Pulmonary:      Breath sounds: Wheezing present.      Comments: Increased work of breathing  Abdominal:      General: There is no distension.      Palpations: Abdomen is soft.      Tenderness: Tenderness: mild, appropriate.      Comments: Trocar incisions c/d/i   Musculoskeletal:         General: Normal range of motion.   Skin:     General: Skin is warm and dry.   Neurological:      General: No focal deficit present.      Mental Status: She is alert and oriented to person, place, and time.         Lab, Imaging and other studies: Results Review Statement: No pertinent imaging studies reviewed.    Lab Results   Component Value Date    WBC 6.67 10/17/2024    HGB 11.9 10/17/2024    HCT 38.1 10/17/2024    MCV 87 10/17/2024     10/17/2024           Kaylee Plasencia MD  10/30/24

## 2024-10-30 NOTE — ANESTHESIA POSTPROCEDURE EVALUATION
Post-Op Assessment Note    CV Status:  Stable    Pain management: adequate       Mental Status:  Alert and awake   Hydration Status:  Euvolemic   PONV Controlled:  Controlled   Airway Patency:  Patent     Post Op Vitals Reviewed: Yes    No anethesia notable event occurred.    Staff: Anesthesiologist           Last Filed PACU Vitals:  Vitals Value Taken Time   Temp 98.2 °F (36.8 °C) 10/30/24 1615   Pulse 108 10/30/24 1615   /84 10/30/24 1615   Resp 18 10/30/24 1615   SpO2 91 % 10/30/24 1615       Modified Katlyn:  Activity: 2 (10/30/2024  3:45 PM)  Respiration: 2 (10/30/2024  3:45 PM)  Circulation: 2 (10/30/2024  3:45 PM)  Consciousness: 1 (10/30/2024  3:45 PM)  Oxygen Saturation: 1 (10/30/2024  3:45 PM)  Modified Katlyn Score: 8 (10/30/2024  3:45 PM)

## 2024-10-30 NOTE — DISCHARGE INSTR - AVS FIRST PAGE
Post-Gynecologic Surgery Discharge Instructions:  1. No heavy lifting more than one full gallon of milk (about 8 lbs) for 1 week.  2. Nothing in the vagina for 6 weeks  3. You may take stairs one at a time, touching each step with both feet for the first few days, then as tolerated.  4. Call the office for fever greater than 100.4'F, heavy vaginal bleeding, or increasing pain.  5. Activity as tolerated.  6. Avoid driving if taking narcotic pain medications (Roxicodone).    If you have any questions regarding your prescriptions please call your doctor.    Postoperative Opioid Use  You may have been prescribed an opioid pain medication to assist with your postoperative pain control. Our goal is for your pain to be controlled, not for you to be completely pain free. Being pain free after surgery takes time.   While this medication is excellent at treating postoperative pain, it has several side effects and addictive properties. Here is a suggested plan to minimize opiate use:    Use non-opiate methods of pain control first  Use ice packs over your incision to reduce pain and swelling. This will help minimize the need for medications.   For the first few days after your surgery, take 650 mg of Acetaminophen (Tylenol) every 6 hours regularly. In addition, take 600 mg of Ibuprofen (Motrin) every 6 hours regularly. Using these medications will help keep you from getting into severe pain and can reduce how much narcotic pain medication you need even if they are not enough to control your pain.     Use opiate medication when the other methods are not adequate  For the first few days after surgery, you may need additional pain relief   You may take your opiate medication every 4-6 hours.   This is the first medication you should stop taking as your pain improves.    After the first few days, you should not require opiate medications  You may continue to take 650 mg of Acetaminophen (Tylenol) every 6 hours regularly.   You may  take Ibuprofen (Motrin) 600 mg as needed for additional pain relief.   If you have unused tablets of the opiate medication, see below  Do not take more than 4,000 mg of Acetaminophen (Tylenol) in a 24 hour period. Please ask your doctor for guidance on amount of Acetaminophen (Tylenol) it is safe to take if you have known liver disease. If you have a sensitive stomach, please take Ibuprofen (Motrin) with food. Please ask your doctor for guidance on safety of Ibuprofen (Motrin) if you have known kidney disease or if you have a history of weight loss surgery.    Side effects of opioids  Do not drive or operate heavy machinery while using opioid pain medications   A few days of postoperative opioid use is safe while breastfeeding   Opioids can cause constipation. Please be sure to drink lots of water, and your doctor may recommend use of a stool softener, such as docusate sodium (Colace) 100 mg twice a day or Senna 8.6 mg every night.    Disposing of your unused pills  It is very likely that you will not need all of the pills you have been prescribed. They should not be used to treat other pain. They should not be shared with other people. They should not be saved. It is not safe to take  medications. It is not safe to keep them around the house. It is not safe to keep them within reach of children or pets. You should dispose of unused pills by taking them to a Controlled Substance Disposal Site.    Controlled Substance Disposal Sites   The following locations are controlled substance disposal sites. You may walk in to any location with unused pills and dispose of them safely and anonymously.    Pharmacies that accept unused pills  [facility]  [address]   [address], [place]   [address], [place]   [address], [place]   [address], [place]   [address] [facility]  [address] [place] [address] ([address]),  [facility]   [address], [place]  Please call your local pharmacy to see if they also have a Controlled Substance Disposal location and are not on this list.    Police departments may accept unused pills  Most local police departments have a drop box on their premises. Please contact your local police department for more information. Information can also be found here: https://safe.pharmacy/drug-disposal/ [safe.pharmacy]    National Prescription Drug Take Back Day  There is a biannual National Prescription Drug Take Back Day, usually in April and October, when there are more sites available than usual, including most [facility] locations. Information for that can be found here: https://takebackday.lizzy.gov/ [takebackday.lizzy.gov]  You can find more information about prescription disposal here:   https://safe.pharmacy/drug-disposal/ [safe.pharmacy]   https://www.[place]ap.[place].gov/Prevention/Pages/Drug_Take_Back.aspx [ddap.[place].gov]

## 2024-10-30 NOTE — PROGRESS NOTES
RN was unable to reach pt's family in waiting room even after calling number listed in patients chart, RN left voicemail with update on pt status. RN will attempt to give second update at 1600.

## 2024-10-30 NOTE — ANESTHESIA PREPROCEDURE EVALUATION
Procedure:  LTH, BILATERAL SALPINGECTOMY, CYSTO, EUA (Bilateral: Abdomen)  CYSTO (Bladder)    Relevant Problems   GI/HEPATIC   (+) Gastroesophageal reflux disease without esophagitis      MUSCULOSKELETAL   (+) Chronic bilateral low back pain without sciatica      NEURO/PSYCH   (+) Chronic bilateral low back pain without sciatica   (+) Mild single current episode of major depressive disorder (HCC)      PULMONARY   (+) Asthma, mild persistent      Other   (+) Obesity, Class III, BMI 40-49.9 (morbid obesity) (HCC)        Physical Exam    Airway    Mallampati score: II  TM Distance: >3 FB  Neck ROM: full     Dental       Cardiovascular  Cardiovascular exam normal    Pulmonary  Pulmonary exam normal     Other Findings  post-pubertal.      Anesthesia Plan  ASA Score- 3     Anesthesia Type- general with ASA Monitors.         Additional Monitors:     Airway Plan: ETT.    Comment: TAP d/w pt consent obtained   .       Plan Factors-Exercise tolerance (METS): >4 METS.    Chart reviewed.   Existing labs reviewed. Patient summary reviewed.    Patient is not a current smoker.      Obstructive sleep apnea risk education given perioperatively.        Induction- intravenous.    Postoperative Plan- Plan for postoperative opioid use.     Perioperative Resuscitation Plan - Level 1 - Full Code.       Informed Consent- Anesthetic plan and risks discussed with patient.

## 2024-10-30 NOTE — OP NOTE
OPERATIVE REPORT  PATIENT NAME: Renee Wall    :  1985  MRN: [id number]  Pt Location: [facility] OR ROOM 01    SURGERY DATE: 10/30/2024    Surgeons and Role:     * Fatmata Perez MD - Primary     * Kaylee Plasencia MD - Assisting     * Melanie Gale MD - Fellow    Preop Diagnosis:  Adenomyosis [N80.03]    Post-Op Diagnosis Codes:     * Adenomyosis [N80.03]    Procedure(s):  Bilateral - LTH. BILATERAL SALPINGECTOMY. CYSTO. EUA  CYSTO    Specimen(s):  ID Type Source Tests Collected by Time Destination   1 : uterus, cervix and bilateral fallopian tubes Tissue Uterus TISSUE EXAM Fatmata Perez MD 10/30/2024 1324      Estimated Blood Loss:   Minimal    Operative Findings:  Normal external female genitalia, vagina and cervix  Grossly normal liver, gallbladder, stomach, intestines, and omentum  Enlarged, globular uterus with grossly normal fallopian tubes and polycystic ovaries normal. Mildly thickened peritoneum and increased vascularity, consistent with adenomyosis  No evidence of endometriosis  Bilateral ureteral vermiculation observed transperitoneally  Cystoscopy without evidence of bladder injury, bilateral ureteral efflux noted    Procedure Technique:  The patient was identified in pre-op holding where procedure was reviewed and confirmed. She was transported to the operating room where SCDs were applied to the lower extremities for DVT prophylaxis. General anesthesia was administered without difficulty. Ancef was given for surgical prophylaxis. She was positioned in dorsal lithotomy using yellofin stirrups with the arms tucked. All pressure points were padded, and a asuncion hugger was placed to maintain body temperature. Exam under anesthesia was performed with the above-noted findings. The vagina was prepped with chlorhexidine, and the abdomen was prepped with Chloraprep. After appropriate drying time, sterile drapes were placed. Time-out was performed with all parties in agreement.    A jeffers catheter was  inserted into the bladder. A weighted speculum and a Eugenio retractor were placed in the vagina for visualization of the cervix, and the anterior lip of the cervix was grasped with a single-tooth tenaculum. The Eugenio retractor was removed, and the uterus was sounded to 8 cm. The cervix was serially dilated to accommodate the SONIA uterine manipulator. A stay suture of 0-Vicryl was applied to the anterior lip of the cervix, and the tenaculum was removed. The SONIA was placed, and the vaginal occluder balloon was inflated.     Sterile gloves were exchanged, and attention was turned to the abdomen. After injection of local, a 10 mm vertical incision was made in the base of the umbilicus.    The laparoscope was inserted under direct visualization. Pneumoperitoneum was established at 15 mmHg. There was no evidence of injury directly below the port insertion site. Due to difficulty with ventilation noted by anesthesia, most of the case was completed with the intraperitoneal pressure at 12 mmHg.    After injection of local, two additional 5 mm incisions were made in the right and left lower quadrants approximately 2 cm superior and medial to the anterior superior iliac spines. Two 5 mm ports were introduced under direct visualization. The patient was placed in Trendelenburg. The abdomen and pelvis were examined with the above-noted findings. The remainder of the procedure was performed in the same fashion bilaterally unless otherwise stated.     The fallopian tube was grasped and elevated by its fimbriated end using an atraumatic bowel grasper. The transperitoneal course of the ureter was identified to be well out of the planned surgical field. Using the Enseal, the mesosalpinx of the fallopian tube was coagulated and cut, and the fallopian tube was amputated at the uterine cornua and removed from the abdomen. The round and utero-ovarian ligaments were sequentially coagulated and cut. The broad ligament was coagulated and  cut, and its anterior and posterior leaves were  to initiate the bladder flap. The bladder flap was started on the left and dissected approximately half-way across the lower uterine segment. The left uterine artery was coagulated.    Attention was turned to the contralateral side where the same series of steps was performed. The bladder flap was completed, and the bladder was pushed down below the level of the SONIA cup using a peanut. The bilateral uterine arteries were coagulated and cut, and the bilateral uterosacral-cardinal ligament complexes were coagulated and cut. Blanching of the uterus was confirmed.    The colpotomy was made with the monopolar hook. Attention was then turned to the perineum where the occluder balloon was deflated and the specimens were removed vaginally. All specimens (uterus, cervix, bilateral fallopian tubes) were sent for routine pathology. A sterile glove with a lap sponge was placed in the vagina to maintain pneumoperitoneum.    Sterile gloves were exchanged, and attention was turned to the abdomen.    The vaginal cuff was closed laparoscopically with running 2-0 PDS Stratafix suture. The suture was cut, and the needle was removed from the abdomen.    Excellent hemostasis of the vaginal cuff was noted.    The pelvis was irrigated, and excellent hemostasis was confirmed at all surgical sites under reduced intraabdominal pressure. The lateral ports were removed under direct visualization. Pneumoperitoneum was evacuated, and the laparoscope was removed with its port.The skin was closed with 4-0 Monocryl and covered with Exofin.    The jeffers catheter was removed from the bladder, and cystoscopy was performed. The bladder was confirmed to be intact with no evidence of intravesical injury or suture, and bilateral ureteral jets were visualized. The cystoscope was withdrawn, and the bladder was drained with the jeffers catheter. There were no lacerations to the vagina or perineum. The  patient was extubated and transferred to PACU in stable condition. She tolerated the procedure well. All needle, sponge, and instrument counts were correct x2 at the end of the procedure.     Dr. Perez was present for the entire procedure.         SIGNATURE: Kaylee Plasencia MD  DATE: October 30, 2024  TIME: 1:57 PM

## 2024-10-30 NOTE — PLAN OF CARE
Problem: PAIN - ADULT  Goal: Verbalizes/displays adequate comfort level or baseline comfort level  Description: Interventions:  - Encourage patient to monitor pain and request assistance  - Assess pain using appropriate pain scale  - Administer analgesics based on type and severity of pain and evaluate response  - Implement non-pharmacological measures as appropriate and evaluate response  - Consider cultural and social influences on pain and pain management  - Notify physician/advanced practitioner if interventions unsuccessful or patient reports new pain  Outcome: Progressing     Problem: INFECTION - ADULT  Goal: Absence or prevention of progression during hospitalization  Description: INTERVENTIONS:  - Assess and monitor for signs and symptoms of infection  - Monitor lab/diagnostic results  - Monitor all insertion sites, i.e. indwelling lines, tubes, and drains  - Monitor endotracheal if appropriate and nasal secretions for changes in amount and color  - [place] appropriate cooling/warming therapies per order  - Administer medications as ordered  - Instruct and encourage patient and family to use good hand hygiene technique  - Identify and instruct in appropriate isolation precautions for identified infection/condition  Outcome: Progressing     Problem: SAFETY ADULT  Goal: Patient will remain free of falls  Description: INTERVENTIONS:  - Educate patient/family on patient safety including physical limitations  - Instruct patient to call for assistance with activity   - Consult OT/PT to assist with strengthening/mobility   - Keep Call bell within reach  - Keep bed low and locked with side rails adjusted as appropriate  - Keep care items and personal belongings within reach  - Initiate and maintain comfort rounds  - Make Fall Risk Sign visible to staff  - Offer Toileting every 2 Hours, in advance of need  - Initiate/Maintain bed alarm  - Apply yellow socks and bracelet for high fall risk patients  - Consider  moving patient to room near nurses station  Outcome: Progressing     Problem: DISCHARGE PLANNING  Goal: Discharge to home or other facility with appropriate resources  Description: INTERVENTIONS:  - Identify barriers to discharge w/patient and caregiver  - Arrange for needed discharge resources and transportation as appropriate  - Identify discharge learning needs (meds, wound care, etc.)  - Arrange for interpretive services to assist at discharge as needed  - Refer to Case Management Department for coordinating discharge planning if the patient needs post-hospital services based on physician/advanced practitioner order or complex needs related to functional status, cognitive ability, or social support system  Outcome: Progressing

## 2024-10-31 ENCOUNTER — APPOINTMENT (OUTPATIENT)
Dept: RADIOLOGY | Facility: HOSPITAL | Age: 39
End: 2024-10-31
Payer: COMMERCIAL

## 2024-10-31 ENCOUNTER — APPOINTMENT (OUTPATIENT)
Dept: CT IMAGING | Facility: HOSPITAL | Age: 39
End: 2024-10-31
Payer: COMMERCIAL

## 2024-10-31 VITALS
TEMPERATURE: 97.7 F | WEIGHT: 221.34 LBS | SYSTOLIC BLOOD PRESSURE: 120 MMHG | DIASTOLIC BLOOD PRESSURE: 67 MMHG | RESPIRATION RATE: 18 BRPM | OXYGEN SATURATION: 96 % | BODY MASS INDEX: 43.23 KG/M2 | HEART RATE: 87 BPM

## 2024-10-31 PROBLEM — D72.829 LEUKOCYTOSIS: Status: ACTIVE | Noted: 2024-10-31

## 2024-10-31 LAB
4HR DELTA HS TROPONIN: >1 NG/L
ANION GAP SERPL CALCULATED.3IONS-SCNC: 7 MMOL/L (ref 4–13)
ATRIAL RATE: 104 BPM
BASOPHILS # BLD AUTO: 0.02 THOUSANDS/ΜL (ref 0–0.1)
BASOPHILS NFR BLD AUTO: 0 % (ref 0–1)
BNP SERPL-MCNC: 150 PG/ML (ref 0–100)
BUN SERPL-MCNC: 7 MG/DL (ref 5–25)
CALCIUM SERPL-MCNC: 8.4 MG/DL (ref 8.4–10.2)
CARDIAC TROPONIN I PNL SERPL HS: 3 NG/L
CARDIAC TROPONIN I PNL SERPL HS: <2 NG/L
CARDIAC TROPONIN I PNL SERPL HS: <2 NG/L
CHLORIDE SERPL-SCNC: 109 MMOL/L (ref 96–108)
CO2 SERPL-SCNC: 23 MMOL/L (ref 21–32)
CREAT SERPL-MCNC: 0.7 MG/DL (ref 0.6–1.3)
D DIMER PPP FEU-MCNC: 1.8 UG/ML FEU
EOSINOPHIL # BLD AUTO: 0 THOUSAND/ΜL (ref 0–0.61)
EOSINOPHIL NFR BLD AUTO: 0 % (ref 0–6)
ERYTHROCYTE [DISTWIDTH] IN BLOOD BY AUTOMATED COUNT: 14.7 % (ref 11.6–15.1)
ERYTHROCYTE [DISTWIDTH] IN BLOOD BY AUTOMATED COUNT: 14.8 % (ref 11.6–15.1)
GFR SERPL CREATININE-BSD FRML MDRD: 111 ML/MIN/1.73SQ M
GLUCOSE SERPL-MCNC: 146 MG/DL (ref 65–140)
HCT VFR BLD AUTO: 34.9 % (ref 34.8–46.1)
HCT VFR BLD AUTO: 36.8 % (ref 34.8–46.1)
HGB BLD-MCNC: 11.2 G/DL (ref 11.5–15.4)
HGB BLD-MCNC: 11.8 G/DL (ref 11.5–15.4)
IMM GRANULOCYTES # BLD AUTO: 0.11 THOUSAND/UL (ref 0–0.2)
IMM GRANULOCYTES NFR BLD AUTO: 1 % (ref 0–2)
LYMPHOCYTES # BLD AUTO: 0.89 THOUSANDS/ΜL (ref 0.6–4.47)
LYMPHOCYTES NFR BLD AUTO: 4 % (ref 14–44)
MCH RBC QN AUTO: 28.5 PG (ref 26.8–34.3)
MCH RBC QN AUTO: 28.5 PG (ref 26.8–34.3)
MCHC RBC AUTO-ENTMCNC: 32.1 G/DL (ref 31.4–37.4)
MCHC RBC AUTO-ENTMCNC: 32.1 G/DL (ref 31.4–37.4)
MCV RBC AUTO: 89 FL (ref 82–98)
MCV RBC AUTO: 89 FL (ref 82–98)
MONOCYTES # BLD AUTO: 0.49 THOUSAND/ΜL (ref 0.17–1.22)
MONOCYTES NFR BLD AUTO: 2 % (ref 4–12)
NEUTROPHILS # BLD AUTO: 20.07 THOUSANDS/ΜL (ref 1.85–7.62)
NEUTS SEG NFR BLD AUTO: 93 % (ref 43–75)
NRBC BLD AUTO-RTO: 0 /100 WBCS
P AXIS: 55 DEGREES
PLATELET # BLD AUTO: 342 THOUSANDS/UL (ref 149–390)
PLATELET # BLD AUTO: 343 THOUSANDS/UL (ref 149–390)
PMV BLD AUTO: 9.5 FL (ref 8.9–12.7)
PMV BLD AUTO: 9.7 FL (ref 8.9–12.7)
POTASSIUM SERPL-SCNC: 4.3 MMOL/L (ref 3.5–5.3)
PR INTERVAL: 146 MS
PROCALCITONIN SERPL-MCNC: 0.07 NG/ML
QRS AXIS: 34 DEGREES
QRSD INTERVAL: 70 MS
QT INTERVAL: 348 MS
QTC INTERVAL: 457 MS
RBC # BLD AUTO: 3.93 MILLION/UL (ref 3.81–5.12)
RBC # BLD AUTO: 4.14 MILLION/UL (ref 3.81–5.12)
SODIUM SERPL-SCNC: 139 MMOL/L (ref 135–147)
T WAVE AXIS: 20 DEGREES
VENTRICULAR RATE: 104 BPM
WBC # BLD AUTO: 20.28 THOUSAND/UL (ref 4.31–10.16)
WBC # BLD AUTO: 21.58 THOUSAND/UL (ref 4.31–10.16)

## 2024-10-31 PROCEDURE — 93010 ELECTROCARDIOGRAM REPORT: CPT | Performed by: STUDENT IN AN ORGANIZED HEALTH CARE EDUCATION/TRAINING PROGRAM

## 2024-10-31 PROCEDURE — 85027 COMPLETE CBC AUTOMATED: CPT

## 2024-10-31 PROCEDURE — 83880 ASSAY OF NATRIURETIC PEPTIDE: CPT

## 2024-10-31 PROCEDURE — 93005 ELECTROCARDIOGRAM TRACING: CPT

## 2024-10-31 PROCEDURE — 99024 POSTOP FOLLOW-UP VISIT: CPT | Performed by: OBSTETRICS & GYNECOLOGY

## 2024-10-31 PROCEDURE — 71045 X-RAY EXAM CHEST 1 VIEW: CPT

## 2024-10-31 PROCEDURE — 99222 1ST HOSP IP/OBS MODERATE 55: CPT

## 2024-10-31 PROCEDURE — 84484 ASSAY OF TROPONIN QUANT: CPT

## 2024-10-31 PROCEDURE — 84145 PROCALCITONIN (PCT): CPT

## 2024-10-31 PROCEDURE — 80048 BASIC METABOLIC PNL TOTAL CA: CPT

## 2024-10-31 PROCEDURE — 85379 FIBRIN DEGRADATION QUANT: CPT

## 2024-10-31 PROCEDURE — 71275 CT ANGIOGRAPHY CHEST: CPT

## 2024-10-31 RX ORDER — FLUTICASONE FUROATE AND VILANTEROL 100; 25 UG/1; UG/1
1 POWDER RESPIRATORY (INHALATION) DAILY
Status: DISCONTINUED | OUTPATIENT
Start: 2024-10-31 | End: 2024-10-31 | Stop reason: HOSPADM

## 2024-10-31 RX ORDER — IPRATROPIUM BROMIDE AND ALBUTEROL SULFATE 2.5; .5 MG/3ML; MG/3ML
3 SOLUTION RESPIRATORY (INHALATION) EVERY 6 HOURS PRN
Status: DISCONTINUED | OUTPATIENT
Start: 2024-10-31 | End: 2024-10-31 | Stop reason: HOSPADM

## 2024-10-31 RX ADMIN — PANTOPRAZOLE SODIUM 40 MG: 40 TABLET, DELAYED RELEASE ORAL at 06:07

## 2024-10-31 RX ADMIN — CELECOXIB 200 MG: 200 CAPSULE ORAL at 08:21

## 2024-10-31 RX ADMIN — FLUTICASONE FUROATE AND VILANTEROL TRIFENATATE 1 PUFF: 100; 25 POWDER RESPIRATORY (INHALATION) at 08:21

## 2024-10-31 RX ADMIN — IBUPROFEN 600 MG: 600 TABLET, FILM COATED ORAL at 11:39

## 2024-10-31 RX ADMIN — IOHEXOL 85 ML: 350 INJECTION, SOLUTION INTRAVENOUS at 04:30

## 2024-10-31 RX ADMIN — ACETAMINOPHEN 650 MG: 325 TABLET, FILM COATED ORAL at 06:07

## 2024-10-31 RX ADMIN — IBUPROFEN 600 MG: 600 TABLET, FILM COATED ORAL at 06:07

## 2024-10-31 RX ADMIN — ACETAMINOPHEN 650 MG: 325 TABLET, FILM COATED ORAL at 11:39

## 2024-10-31 NOTE — ASSESSMENT & PLAN NOTE
BMI 43  Patient would benefit greatly from a very low carbohydrate diet (less than 50 g daily) and intermittent fasting

## 2024-10-31 NOTE — CONSULTS
Consultation - Hospitalist   Name: Renee Wall 38 y.o. female I MRN: [id number]  Unit/Bed#: E5 -01 I Date of Admission: 10/30/2024   Date of Service: 10/31/2024 I Hospital Day: 0   Inpatient consult to Internal Medicine  Consult performed by: NICOLAS Sosa  Consult ordered by: Therese Looney MD        Physician Requesting Evaluation: Fatmata Perez MD   Reason for Evaluation / Principal Problem: Respiratory distress    Assessment & Plan  Asthma, mild persistent  Patient underwent laparoscopic hysterectomy today with OB/GYN, after the procedure patient began having respiratory distress with wheezing/dizziness/chest pressure.    Chest x-ray stable, no obvious effusions/infiltrates/pneumos  D-dimer 1.8, the attending service has ordered a CTA chest  Patient currently resting comfortably on room air  Restart Breo  As needed DuoNebs  Obesity, Class III, BMI 40-49.9 (morbid obesity) (HCC)  BMI 43  Patient would benefit greatly from a very low carbohydrate diet (less than 50 g daily) and intermittent fasting  Prediabetes  Most recent hemoglobin A1c 5.8, patient is insulin resistant  Patient would benefit greatly from a very low carbohydrate diet (less than 50 g daily) and intermittent fasting  Patient takes Wegovy  Leukocytosis  Most likely reactive in the setting of laparoscopic surgery  Patient afebrile  Continue to trend  Check procalcitonin  I have discussed the above management plan in detail with the primary service.       VTE Pharmacologic Prophylaxis:   Low Risk (Score 0-2) - Encourage Ambulation.  Code Status: Full code  Discussion with patient    Anticipated Length of Stay: Patient will be admitted on an observation basis with an anticipated length of stay of less than 2 midnights secondary to respiratory distress.    History of Present Illness   Chief Complaint: Respiratory distress, dyspnea on exertion, chest pressure and dizziness    Renee Wall is a 38 y.o. female with a PMH  of morbid obesity, prediabetes, adenomyosis, and asthma who presents to the hospital for scheduled hysterectomy.  In the postoperative setting patient began having respiratory distress thus prompting a consult from the attending service.    Review of Systems   Constitutional:  Negative for chills and fever.   HENT:  Negative for ear pain and sore throat.    Eyes:  Negative for pain and visual disturbance.   Respiratory:  Positive for cough. Negative for shortness of breath, wheezing and stridor.         Cough when taking deep breath     Cardiovascular:  Negative for chest pain and palpitations.   Gastrointestinal:  Negative for abdominal pain and vomiting.   Genitourinary:  Negative for dysuria and hematuria.   Musculoskeletal:  Negative for arthralgias and back pain.   Skin:  Negative for color change and rash.   Neurological:  Negative for seizures and syncope.   All other systems reviewed and are negative.      Historical Information   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     Depression     GERD (gastroesophageal reflux disease)     Obesity     Scoliosis     Visual impairment      Past Surgical History:   Procedure Laterality Date    DENTAL SURGERY      teeth    HERNIA REPAIR       Social History     Tobacco Use    Smoking status: Former     Current packs/day: 0.00     Types: Cigarettes     Quit date:      Years since quittin.8    Smokeless tobacco: Never   Vaping Use    Vaping status: Never Used   Substance and Sexual Activity    Alcohol use: Not Currently     Comment: occasionally    Drug use: Never    Sexual activity: Yes     Partners: Male     Birth control/protection: None     E-Cigarette/Vaping    E-Cigarette Use Never User      E-Cigarette/Vaping Substances    Nicotine No     THC No     CBD No     Flavoring No     Other No     Unknown No      Family History   Problem Relation Age of Onset    Heart disease Mother     Thyroid  disease Mother     Arthritis Mother     Diabetes Mother     Heart attack Mother     Depression Father     Arthritis Father     No Known Problems Sister     No Known Problems Brother     No Known Problems Daughter     No Known Problems Son     Ovarian cancer Maternal Aunt         50s    Diabetes Maternal Aunt     Colon cancer Maternal Aunt     Diabetes Maternal Aunt     Diabetes Maternal Uncle     Diabetes Maternal Uncle     Sickle cell anemia Paternal Aunt     Sickle cell anemia Paternal Aunt     No Known Problems Paternal Aunt     No Known Problems Paternal Aunt     No Known Problems Paternal Aunt     No Known Problems Paternal Aunt     No Known Problems Paternal Uncle     No Known Problems Paternal Uncle     Sickle cell anemia Paternal Uncle     Coronary artery disease Maternal Grandmother     Hyperlipidemia Maternal Grandmother     Thyroid disease Maternal Grandmother     Asthma Maternal Grandmother     Arthritis Maternal Grandmother     Vision loss Maternal Grandmother     Breast cancer Maternal Grandmother         age unknown    Diabetes Maternal Grandmother     Hyperlipidemia Maternal Grandfather     Thyroid disease Maternal Grandfather     Diabetes Maternal Grandfather     No Known Problems Paternal Grandmother     No Known Problems Paternal Grandfather      Social History:  Marital Status: Single   Patient Pre-hospital Level of Mobility: walks  Patient Pre-hospital Diet Restrictions: None    Meds/Allergies   I have reviewed home medications using recent Epic encounter.  Prior to Admission medications    Medication Sig Start Date End Date Taking? Authorizing Provider   acetaminophen (TYLENOL) 500 mg tablet Take 2 tablets (1,000 mg total) by mouth every 8 (eight) hours as needed for mild pain 10/30/24  Yes Fatmata Perez MD   celecoxib (CeleBREX) 200 mg capsule Take 1 capsule (200 mg total) by mouth 2 (two) times a day 7/10/24  Yes Jordy Ambrosio MD   Diclofenac Sodium (VOLTAREN) 1 % Apply 2 g topically 4  (four) times a day 1/10/24  Yes Jordy Ambrosio MD   ergocalciferol (VITAMIN D2) 50,000 units Take 1 capsule (50,000 Units total) by mouth once a week 7/10/24  Yes Jordy Ambrosio MD   ibuprofen (MOTRIN) 600 mg tablet Take 1 tablet (600 mg total) by mouth every 6 (six) hours as needed for mild pain 10/30/24  Yes Fatmata Perez MD   levalbuterol (Xopenex HFA) 45 mcg/act inhaler Inhale 1-2 puffs every 4 (four) hours as needed for wheezing or shortness of breath 5/22/24  Yes Jose Luis Rodriguez MD   omeprazole (PriLOSEC) 40 MG capsule Take 1 capsule (40 mg total) by mouth daily 10/26/23  Yes Cb Barclay DO   oxyCODONE (Roxicodone) 5 immediate release tablet Take 1 tablet (5 mg total) by mouth every 6 (six) hours as needed for severe pain Max Daily Amount: 20 mg 10/30/24  Yes Fatmata Perez MD   Semaglutide-Weight Management (WEGOVY) 0.5 MG/0.5ML Inject 0.5 mL (0.5 mg total) under the skin once a week 9/3/24  Yes Nicole Jesus PA-C   albuterol (2.5 mg/3 mL) 0.083 % nebulizer solution Take 3 mL (2.5 mg total) by nebulization every 6 (six) hours as needed for wheezing or shortness of breath 10/26/23   Cb Barclay DO   Alcohol Swabs (Pharmacist Choice Alcohol) PADS  9/3/24   Historical Provider, MD   cyclobenzaprine (FLEXERIL) 5 mg tablet Take 1 tablet (5 mg total) by mouth daily at bedtime  Patient taking differently: Take 5 mg by mouth daily at bedtime PRN 1/10/24   Jordy Ambrosio MD   famotidine (PEPCID) 20 mg tablet Take 1 tablet (20 mg total) by mouth 2 (two) times a day  Patient taking differently: Take 20 mg by mouth 2 (two) times a day PRN 3/8/24   Jacki Peace PA-C   fexofenadine-pseudoephedrine (Allegra-D Allergy & Congestion) 180-240 MG per 24 hr tablet Take 1 tablet by mouth daily 5/21/24   Cb Barclay,    Fluticasone Furoate-Vilanterol 100-25 mcg/actuation inhaler Inhale 1 puff daily Rinse mouth after use. 5/31/24 11/27/24  Cb Barclay DO   gabapentin (Neurontin) 100 mg capsule  Take 1 capsule (100 mg total) by mouth 3 (three) times a day During a pain flare-up  Patient taking differently: Take 100 mg by mouth 3 (three) times a day During a pain flare-up PRN 7/10/24   Jordy Ambrosio MD   Semaglutide-Weight Management (WEGOVY) 1 MG/0.5ML Inject 0.5 mL (1 mg total) under the skin once a week 10/17/24 11/16/24  Eben Betts PA-C     No Known Allergies    Objective :  Temp:  [96.9 °F (36.1 °C)-98.8 °F (37.1 °C)] 97.9 °F (36.6 °C)  HR:  [] 89  BP: ()/(54-84) 103/56  Resp:  [14-18] 18  SpO2:  [90 %-100 %] 96 %  O2 Device: Nasal cannula    Physical Exam  Vitals and nursing note reviewed.   Constitutional:       General: She is awake. She is not in acute distress.     Appearance: She is well-developed. She is morbidly obese.   HENT:      Head: Normocephalic and atraumatic.   Eyes:      Conjunctiva/sclera: Conjunctivae normal.   Cardiovascular:      Rate and Rhythm: Normal rate and regular rhythm.      Heart sounds: No murmur heard.  Pulmonary:      Effort: Pulmonary effort is normal. No respiratory distress.      Breath sounds: Normal breath sounds.   Abdominal:      General: Bowel sounds are normal.      Palpations: Abdomen is soft.      Tenderness: There is abdominal tenderness.      Comments: Trochar sites intact, pain controlled     Musculoskeletal:         General: No swelling.      Cervical back: Neck supple.   Skin:     General: Skin is warm and dry.      Capillary Refill: Capillary refill takes less than 2 seconds.   Neurological:      Mental Status: She is alert.   Psychiatric:         Mood and Affect: Mood normal.          Lines/Drains:            Lab Results: I have reviewed the following results:  Results from last 7 days   Lab Units 10/30/24  2355   WBC Thousand/uL 21.58*   HEMOGLOBIN g/dL 11.8   HEMATOCRIT % 36.8   PLATELETS Thousands/uL 342   SEGS PCT % 93*   LYMPHO PCT % 4*   MONO PCT % 2*   EOS PCT % 0                 Lab Results   Component Value Date    HGBA1C 5.8  (H) 10/17/2024    HGBA1C 6.0 (H) 01/12/2024           Imaging Results Review: I personally reviewed the following image studies in PACS and associated radiology reports: chest xray. My interpretation of the radiology images/reports is: No pneumothorax, no pleural effusion.  Other Study Results Review: EKG was personally reviewed and my interpretation is: Sinus Tachycardia. , chronic bundle branch block..        ** Please Note: This note has been constructed using a voice recognition system. **

## 2024-10-31 NOTE — ASSESSMENT & PLAN NOTE
Patient underwent laparoscopic hysterectomy today with OB/GYN, after the procedure patient began having respiratory distress with wheezing/dizziness/chest pressure.    Chest x-ray stable, no obvious effusions/infiltrates/pneumos  D-dimer 1.8, the attending service has ordered a CTA chest  Patient currently resting comfortably on room air  Restart Breo  As needed Siria

## 2024-10-31 NOTE — ASSESSMENT & PLAN NOTE
Most recent hemoglobin A1c 5.8, patient is insulin resistant  Patient would benefit greatly from a very low carbohydrate diet (less than 50 g daily) and intermittent fasting  Patient takes Wegovy

## 2024-10-31 NOTE — QUICK NOTE
On review of vitals, patient has decreasing SpO2 to the low 90s on room air.  She is also tachycardic to the 1 teens.  Stat CBC with stable hemoglobin at 11.8.  Presented to bedside to evaluate patient.  She states she is feeling short of breath and feels chest pressure.  She states her asthma was well-controlled prior to today and this does not feel like her usual asthma symptoms.  She denies any abdominal pain.  She feels well other than her shortness of breath.  Notified by nursing that when she ambulated to the bathroom she was feeling short of breath and a little weak.     On examination, regular rate and rhythm.  Diminished breath sounds on the right in the middle and lower portions.  No wheezing, rhonchi, crackles appreciated.  Abdomen is soft, nondistended.  Incisions are clean dry and intact.  Abdomen is nontender.    Plan for EKG, stat chest x-ray, D-Dimer, troponin, and BNP to evaluate tachycardia and SOB. Patient placed back on 2L of NC with improvement of SpO2 to 96%

## 2024-10-31 NOTE — NURSING NOTE
Discharge instructions were reviewed with the patient. Patient verbalized that she had no questions and understood her instructions. Patient was transported down to the parking garage via wheelchair by LINDA Quan. Belongings were confirmed prior to discharge.

## 2024-10-31 NOTE — ASSESSMENT & PLAN NOTE
Most likely reactive in the setting of laparoscopic surgery  Patient afebrile  Continue to trend  Check procalcitonin

## 2024-10-31 NOTE — PROGRESS NOTES
Progress Note - OB/GYN   Renee Wall 38 y.o. female MRN: [id number]  Unit/Bed#: E5 -01 Encounter: [id number]    Assessment/Plan:  38 y.o. POD#1 from [facility]/ for adenomyosis, admitted post operatively for respiratory management.    Plan:  - Procedure uncomplicated with minimal blood loss  - Hgb stable 11.8 -> 11.2  - Pain controlled on current regimen  - Tolerating regular diet  - SLIM consulted overnight for hypoxia  - CXR neg, CTA negative for PE, D-dimer 1.8, trops wnl  - recommended restarting the patient's breo with prn duonebs  - O2 saturation currently 95% on room air    Anticipate discharge home later this morning with outpatient medicine follow up      Subjective:     Pain: no  Tolerating PO: yes  Voiding: yes  Flatus: yes  BM: no  Ambulating: yes  Chest pain: no  Shortness of breath: improved  Leg pain: no      Objective:     Vitals: Blood pressure 103/56, pulse 89, temperature 97.9 °F (36.6 °C), resp. rate 18, weight 100 kg (221 lb 5.5 oz), last menstrual period 09/29/2024, SpO2 95%, not currently breastfeeding.    Physical Exam  Vitals reviewed.   HENT:      Mouth/Throat:      Mouth: Mucous membranes are moist.      Pharynx: Oropharynx is clear.   Cardiovascular:      Rate and Rhythm: Normal rate and regular rhythm.   Pulmonary:      Effort: Pulmonary effort is normal. No respiratory distress.      Comments: Increased work of breathing  Abdominal:      General: There is no distension.      Palpations: Abdomen is soft.      Tenderness: Tenderness: mild, appropriate.      Comments: Trocar incisions c/d/i   Musculoskeletal:         General: Normal range of motion.   Skin:     General: Skin is warm and dry.   Neurological:      General: No focal deficit present.      Mental Status: She is alert and oriented to person, place, and time.         Lab, Imaging and other studies: Results Review Statement: No pertinent imaging studies reviewed.    Lab Results   Component Value Date    WBC 20.28 (H) 10/31/2024     HGB 11.2 (L) 10/31/2024    HCT 34.9 10/31/2024    MCV 89 10/31/2024     10/31/2024           Kaylee Plasencia MD  10/31/24

## 2024-10-31 NOTE — PLAN OF CARE
Problem: PAIN - ADULT  Goal: Verbalizes/displays adequate comfort level or baseline comfort level  Description: Interventions:  - Encourage patient to monitor pain and request assistance  - Assess pain using appropriate pain scale  - Administer analgesics based on type and severity of pain and evaluate response  - Implement non-pharmacological measures as appropriate and evaluate response  - Consider cultural and social influences on pain and pain management  - Notify physician/advanced practitioner if interventions unsuccessful or patient reports new pain  Outcome: Progressing     Problem: INFECTION - ADULT  Goal: Absence or prevention of progression during hospitalization  Description: INTERVENTIONS:  - Assess and monitor for signs and symptoms of infection  - Monitor lab/diagnostic results  - Monitor all insertion sites, i.e. indwelling lines, tubes, and drains  - Monitor endotracheal if appropriate and nasal secretions for changes in amount and color  - [place] appropriate cooling/warming therapies per order  - Administer medications as ordered  - Instruct and encourage patient and family to use good hand hygiene technique  - Identify and instruct in appropriate isolation precautions for identified infection/condition  Outcome: Progressing     Problem: SAFETY ADULT  Goal: Patient will remain free of falls  Description: INTERVENTIONS:  - Educate patient/family on patient safety including physical limitations  - Instruct patient to call for assistance with activity   - Consult OT/PT to assist with strengthening/mobility   - Keep Call bell within reach  - Keep bed low and locked with side rails adjusted as appropriate  - Keep care items and personal belongings within reach  - Initiate and maintain comfort rounds  - Make Fall Risk Sign visible to staff  - Offer Toileting every 2 Hours, in advance of need  - Initiate/Maintain bed alarm  - Obtain necessary fall risk management equipment  - Apply yellow socks and  bracelet for high fall risk patients  - Consider moving patient to room near nurses station  Outcome: Progressing     Problem: DISCHARGE PLANNING  Goal: Discharge to home or other facility with appropriate resources  Description: INTERVENTIONS:  - Identify barriers to discharge w/patient and caregiver  - Arrange for needed discharge resources and transportation as appropriate  - Identify discharge learning needs (meds, wound care, etc.)  - Arrange for interpretive services to assist at discharge as needed  - Refer to Case Management Department for coordinating discharge planning if the patient needs post-hospital services based on physician/advanced practitioner order or complex needs related to functional status, cognitive ability, or social support system  Outcome: Progressing

## 2024-11-04 PROCEDURE — 88307 TISSUE EXAM BY PATHOLOGIST: CPT | Performed by: PATHOLOGY

## 2024-11-12 ENCOUNTER — OFFICE VISIT (OUTPATIENT)
Dept: OBGYN CLINIC | Facility: MEDICAL CENTER | Age: 39
End: 2024-11-12

## 2024-11-12 VITALS
DIASTOLIC BLOOD PRESSURE: 60 MMHG | SYSTOLIC BLOOD PRESSURE: 118 MMHG | WEIGHT: 219 LBS | HEIGHT: 60 IN | BODY MASS INDEX: 43 KG/M2

## 2024-11-12 DIAGNOSIS — Z90.710 S/P LAPAROSCOPIC HYSTERECTOMY: ICD-10-CM

## 2024-11-12 DIAGNOSIS — R23.2 HOT FLASHES: ICD-10-CM

## 2024-11-12 DIAGNOSIS — Z09 POSTOPERATIVE EXAMINATION: Primary | ICD-10-CM

## 2024-11-12 PROCEDURE — 99024 POSTOP FOLLOW-UP VISIT: CPT | Performed by: OBSTETRICS & GYNECOLOGY

## 2024-11-12 NOTE — PROGRESS NOTES
Assessment:  38 y.o.  who is POD#2wk from [facility], BS, cysto. She is doing well post-op.    Plan:  Diagnoses and all orders for this visit:    Postoperative examination  S/P laparoscopic hysterectomy  - Routine postop care  - Intra-op findings and surgical pathology reviewed  - Maintain pelvic rest precautions  - Return for cuff check in 4wk    Hot flashes  -     Black Cohosh-SoyIsoflav-C Quad 40- MG CAPS; Take 1 Dose by mouth in the morning   - Discussed likely transitional; ovaries not excised       __________________________________________________________________    Subjective   Renee Wall is a 38 y.o.  who presents POD#2wk from [facility], BS, cysto for adenomyosis.     Patient reports doing well postop. She notes her pain is well-controlled. Some gas discomfort initially, but marked improvement in pelvic pressure.  Reporting some hot flashes off and on. Her incision is healing well; she denies redness or drainage. Her bowel function is normal and she is having regular BM's. She has returned to most of her normal activities. She has not yet returned to sexual activity.       Objective  /60   Ht 5' (1.524 m)   Wt 99.3 kg (219 lb)   LMP 10/23/2024 (Approximate)   BMI 42.77 kg/m²      Physical Exam:  Physical Exam  Constitutional:       General: She is not in acute distress.     Appearance: Normal appearance. She is not ill-appearing, toxic-appearing or diaphoretic.   Eyes:      General: No scleral icterus.        Right eye: No discharge.         Left eye: No discharge.      Conjunctiva/sclera: Conjunctivae normal.   Cardiovascular:      Rate and Rhythm: Normal rate.   Pulmonary:      Effort: Pulmonary effort is normal. No respiratory distress.   Abdominal:      General: There is no distension.      Palpations: There is no mass.      Tenderness: There is no abdominal tenderness. There is no guarding or rebound.      Hernia: No hernia is present.      Comments: Incisions clean, dry, and  "intact. No erythema or drainage   Musculoskeletal:         General: No swelling.   Skin:     General: Skin is warm and dry.      Coloration: Skin is not jaundiced or pale.      Findings: No bruising or erythema.   Neurological:      Mental Status: She is alert.   Psychiatric:         Mood and Affect: Mood normal.         Behavior: Behavior normal.         Thought Content: Thought content normal.         Judgment: Judgment normal.           Surgical Pathology  \"A.  Uterus, cervix, bilateral fallopian tubes (hysterectomy and bilateral salpingectomy):     - Uterus:  Leiomyoma, focal adenomyosis and benign endometrium.     - Cervix:  Nabothian cyst formation.     - Fallopian tubes:  Histologically unremarkable.     - No malignancy identified.\"  "

## 2024-11-18 ENCOUNTER — HOSPITAL ENCOUNTER (EMERGENCY)
Facility: HOSPITAL | Age: 39
Discharge: HOME | End: 2024-11-18
Attending: EMERGENCY MEDICINE
Payer: COMMERCIAL

## 2024-11-18 ENCOUNTER — APPOINTMENT (EMERGENCY)
Dept: CT IMAGING | Facility: HOSPITAL | Age: 39
End: 2024-11-18
Payer: COMMERCIAL

## 2024-11-18 ENCOUNTER — APPOINTMENT (EMERGENCY)
Dept: RADIOLOGY | Facility: HOSPITAL | Age: 39
End: 2024-11-18
Payer: COMMERCIAL

## 2024-11-18 VITALS
HEART RATE: 89 BPM | SYSTOLIC BLOOD PRESSURE: 116 MMHG | RESPIRATION RATE: 18 BRPM | OXYGEN SATURATION: 98 % | WEIGHT: 219.36 LBS | TEMPERATURE: 97.8 F | DIASTOLIC BLOOD PRESSURE: 69 MMHG | BODY MASS INDEX: 42.84 KG/M2

## 2024-11-18 DIAGNOSIS — J06.9 VIRAL URI WITH COUGH: ICD-10-CM

## 2024-11-18 DIAGNOSIS — R05.1 ACUTE COUGH: Primary | ICD-10-CM

## 2024-11-18 LAB
ALBUMIN SERPL BCG-MCNC: 3.9 G/DL (ref 3.5–5)
ALP SERPL-CCNC: 81 U/L (ref 34–104)
ALT SERPL W P-5'-P-CCNC: 16 U/L (ref 7–52)
ANION GAP SERPL CALCULATED.3IONS-SCNC: 7 MMOL/L (ref 4–13)
AST SERPL W P-5'-P-CCNC: 12 U/L (ref 13–39)
ATRIAL RATE: 90 BPM
BASOPHILS # BLD AUTO: 0.04 THOUSANDS/ÂΜL (ref 0–0.1)
BASOPHILS NFR BLD AUTO: 1 % (ref 0–1)
BILIRUB SERPL-MCNC: 0.29 MG/DL (ref 0.2–1)
BUN SERPL-MCNC: 11 MG/DL (ref 5–25)
CALCIUM SERPL-MCNC: 8.6 MG/DL (ref 8.4–10.2)
CARDIAC TROPONIN I PNL SERPL HS: <2 NG/L (ref ?–50)
CHLORIDE SERPL-SCNC: 106 MMOL/L (ref 96–108)
CO2 SERPL-SCNC: 24 MMOL/L (ref 21–32)
CREAT SERPL-MCNC: 0.66 MG/DL (ref 0.6–1.3)
D DIMER PPP FEU-MCNC: 1.14 UG/ML FEU
EOSINOPHIL # BLD AUTO: 0.19 THOUSAND/ÂΜL (ref 0–0.61)
EOSINOPHIL NFR BLD AUTO: 2 % (ref 0–6)
ERYTHROCYTE [DISTWIDTH] IN BLOOD BY AUTOMATED COUNT: 14.9 % (ref 11.6–15.1)
FLUAV AG UPPER RESP QL IA.RAPID: NEGATIVE
FLUBV AG UPPER RESP QL IA.RAPID: NEGATIVE
GFR SERPL CREATININE-BSD FRML MDRD: 113 ML/MIN/1.73SQ M
GLUCOSE SERPL-MCNC: 102 MG/DL (ref 65–140)
HCT VFR BLD AUTO: 36.2 % (ref 34.8–46.1)
HGB BLD-MCNC: 11.7 G/DL (ref 11.5–15.4)
IMM GRANULOCYTES # BLD AUTO: 0.02 THOUSAND/UL (ref 0–0.2)
IMM GRANULOCYTES NFR BLD AUTO: 0 % (ref 0–2)
LYMPHOCYTES # BLD AUTO: 2.53 THOUSANDS/ÂΜL (ref 0.6–4.47)
LYMPHOCYTES NFR BLD AUTO: 32 % (ref 14–44)
MCH RBC QN AUTO: 28.1 PG (ref 26.8–34.3)
MCHC RBC AUTO-ENTMCNC: 32.3 G/DL (ref 31.4–37.4)
MCV RBC AUTO: 87 FL (ref 82–98)
MONOCYTES # BLD AUTO: 0.51 THOUSAND/ÂΜL (ref 0.17–1.22)
MONOCYTES NFR BLD AUTO: 6 % (ref 4–12)
NEUTROPHILS # BLD AUTO: 4.69 THOUSANDS/ÂΜL (ref 1.85–7.62)
NEUTS SEG NFR BLD AUTO: 59 % (ref 43–75)
NRBC BLD AUTO-RTO: 0 /100 WBCS
P AXIS: 18 DEGREES
PLATELET # BLD AUTO: 373 THOUSANDS/UL (ref 149–390)
PMV BLD AUTO: 9 FL (ref 8.9–12.7)
POTASSIUM SERPL-SCNC: 3.8 MMOL/L (ref 3.5–5.3)
PR INTERVAL: 114 MS
PROT SERPL-MCNC: 7 G/DL (ref 6.4–8.4)
QRS AXIS: 33 DEGREES
QRSD INTERVAL: 70 MS
QT INTERVAL: 334 MS
QTC INTERVAL: 408 MS
RBC # BLD AUTO: 4.17 MILLION/UL (ref 3.81–5.12)
SARS-COV+SARS-COV-2 AG RESP QL IA.RAPID: NEGATIVE
SODIUM SERPL-SCNC: 137 MMOL/L (ref 135–147)
T WAVE AXIS: 5 DEGREES
VENTRICULAR RATE: 90 BPM
WBC # BLD AUTO: 7.98 THOUSAND/UL (ref 4.31–10.16)

## 2024-11-18 PROCEDURE — 87804 INFLUENZA ASSAY W/OPTIC: CPT

## 2024-11-18 PROCEDURE — 87811 SARS-COV-2 COVID19 W/OPTIC: CPT

## 2024-11-18 PROCEDURE — 71275 CT ANGIOGRAPHY CHEST: CPT

## 2024-11-18 PROCEDURE — 99284 EMERGENCY DEPT VISIT MOD MDM: CPT

## 2024-11-18 PROCEDURE — 99285 EMERGENCY DEPT VISIT HI MDM: CPT

## 2024-11-18 PROCEDURE — 85025 COMPLETE CBC W/AUTO DIFF WBC: CPT

## 2024-11-18 PROCEDURE — 84484 ASSAY OF TROPONIN QUANT: CPT

## 2024-11-18 PROCEDURE — 80053 COMPREHEN METABOLIC PANEL: CPT

## 2024-11-18 PROCEDURE — 71046 X-RAY EXAM CHEST 2 VIEWS: CPT

## 2024-11-18 PROCEDURE — 93005 ELECTROCARDIOGRAM TRACING: CPT

## 2024-11-18 PROCEDURE — 36415 COLL VENOUS BLD VENIPUNCTURE: CPT

## 2024-11-18 PROCEDURE — 93010 ELECTROCARDIOGRAM REPORT: CPT | Performed by: INTERNAL MEDICINE

## 2024-11-18 PROCEDURE — 85379 FIBRIN DEGRADATION QUANT: CPT

## 2024-11-18 RX ORDER — FLUTICASONE PROPIONATE 50 MCG
1 SPRAY, SUSPENSION (ML) NASAL DAILY
Qty: 16 G | Refills: 0 | Status: SHIPPED | OUTPATIENT
Start: 2024-11-18

## 2024-11-18 RX ORDER — BENZONATATE 100 MG/1
100 CAPSULE ORAL EVERY 8 HOURS
Qty: 21 CAPSULE | Refills: 0 | Status: SHIPPED | OUTPATIENT
Start: 2024-11-18

## 2024-11-18 RX ADMIN — IOHEXOL 85 ML: 350 INJECTION, SOLUTION INTRAVENOUS at 09:35

## 2024-11-18 NOTE — ED PROVIDER NOTES
Time reflects when diagnosis was documented in both MDM as applicable and the Disposition within this note       Time User Action Codes Description Comment    11/18/2024 10:24 AM Raji Cortes [R05.1] Acute cough     11/18/2024 10:24 AM Raji Cortes [J06.9] Viral URI with cough           ED Disposition       ED Disposition   Discharge    Condition   Stable    Date/Time   Mon Nov 18, 2024 10:24 AM    Comment   Renee Wall discharge to home/self care.                   Assessment & Plan       Medical Decision Making  38-year-old female presenting ED with a chief complaint of cough congestion runny nose and chest discomfort.  Patient having recent hysterectomy a month ago.  Cardiopulmonary exam is benign.  Patient overall well-appearing on exam.  Does have nasal congestion.  Bilateral tympanic membranes clear.  Oropharynx clear.  No signs of PTA or airway obstruction.  Initially chest x-ray ordered showing no acute cardiopulmonary disease.  Patient's vitals unremarkable.  Due to recent surgery labs and D-dimer ordered.  Based on exam labs are benign.  D-dimer is positive.  CTA ordered which showed no pulmonary embolus or lung abnormalities.  Patient advised symptomatic treatment in the next few days.  Viral panel negative.  Likely viral URI.  EKG normal sinus.  Tessalon Perles and Flonase sent to pharmacy for symptom relief.  Patient given strict return to ED protocol with any worsening symptoms complaint on discharge.  Disposition explained with follow-ups.    Patient understood diagnosis and treatment plan and had no further questions.  Patient was discharged in stable condition.  Patient was advised to follow-up with her PCP in 1 to 2 days.  Patient was advised to return to the ED with any worsening symptoms that were explained on discharge including but not limited to chest pain, shortness of breath, irretractable vomiting or diarrhea, vision loss, loss of function, loss of sensation, syncope,  "hemoptysis, hematochezia, hematemesis, melena, decreased oral intake, feeling ill.     Ddx-COVID, flu, viral illness, viral syndrome, PE, pneumothorax, pneumonia    Portions of the record may have been created with voice recognition software. Occasional wrong word or \"sound a like\" substitutions may have occurred due to the inherent limitations of voice recognition software. Read the chart carefully and recognize, using context, where substitutions have occurred.      Amount and/or Complexity of Data Reviewed  Labs: ordered.     Details: See [facility]  Radiology: ordered and independent interpretation performed.     Details: See [facility]  ECG/medicine tests: ordered.    Risk  OTC drugs.  Prescription drug management.  Risk Details: Risk of worsening symptoms along with signs and symptoms worsening symptoms were thoroughly explained on discharge.  Risk of incomplete follow-up was discussed.  Patient had full understanding of all risks had no further questions and was discharged in stable condition.              Medications   iohexol (OMNIPAQUE) 350 MG/ML injection (SINGLE-DOSE) 85 mL (85 mL Intravenous Given 11/18/24 0935)       ED Risk Strat Scores   HEART Risk Score      Flowsheet Row Most Recent Value   Heart Score Risk Calculator    History 0 Filed at: 11/18/2024 0855   ECG 0 Filed at: 11/18/2024 0855   Age 0 Filed at: 11/18/2024 0855   Risk Factors 1 Filed at: 11/18/2024 0855   Troponin 0 Filed at: 11/18/2024 0855   HEART Score 1 Filed at: 11/18/2024 0855                               SBIRT 20yo+      Flowsheet Row Most Recent Value   Initial Alcohol Screen: US AUDIT-C     1. How often do you have a drink containing alcohol? 0 Filed at: 11/18/2024 0738   2. How many drinks containing alcohol do you have on a typical day you are drinking?  0 Filed at: 11/18/2024 0738   3b. FEMALE Any Age, or MALE 65+: How often do you have 4 or more drinks on one occassion? 0 Filed at: 11/18/2024 0738   Audit-C Score 0 Filed at: 11/18/2024 " 0738   MARIALUISA: How many times in the past year have you...    Used an illegal drug or used a prescription medication for non-medical reasons? Never Filed at: 11/18/2024 0738            Wells' Criteria for PE      Flowsheet Row Most Recent Value   Mina' Criteria for PE    Clinical signs and symptoms of DVT 0 Filed at: 11/18/2024 0855   PE is primary diagnosis or equally likely 0 Filed at: 11/18/2024 0855   HR >100 1.5 Filed at: 11/18/2024 0855   Immobilization at least 3 days or Surgery in the previous 4 weeks 1.5 Filed at: 11/18/2024 0855   Previous, objectively diagnosed PE or DVT 1.5 Filed at: 11/18/2024 0855   Hemoptysis 0 Filed at: 11/18/2024 0855   Malignancy with treatment within 6 months or palliative 0 Filed at: 11/18/2024 0855   Mina' Criteria Total 4.5 Filed at: 11/18/2024 0855                        History of Present Illness       Chief Complaint   Patient presents with    Cough     Pt reports post op 2 weeks, was hospitalized after due to complications. 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     Depression     GERD (gastroesophageal reflux disease)     Obesity     Scoliosis     Visual impairment       Past Surgical History:   Procedure Laterality Date    DENTAL SURGERY      teeth    HERNIA REPAIR      AZ CYSTOURETHROSCOPY N/A 10/30/2024    Procedure: CYSTO;  Surgeon: Fatmata Perez MD;  Location: [facility];  Service: Gynecology    AZ LAPS TOTAL HYSTERECT 250 GM/< W/RMVL TUBE/OVARY Bilateral 10/30/2024    Procedure: LTH, BILATERAL SALPINGECTOMY, CYSTO, EUA;  Surgeon: Fatmata Perez MD;  Location: [facility];  Service: Gynecology      Family History   Problem Relation Age of Onset    Heart disease Mother     Thyroid disease Mother     Arthritis Mother     Diabetes Mother     Heart attack Mother     Depression Father      Arthritis Father     No Known Problems Sister     No Known Problems Brother     No Known Problems Daughter     No Known Problems Son     Ovarian cancer Maternal Aunt         50s    Diabetes Maternal Aunt     Colon cancer Maternal Aunt     Diabetes Maternal Aunt     Diabetes Maternal Uncle     Diabetes Maternal Uncle     Sickle cell anemia Paternal Aunt     Sickle cell anemia Paternal Aunt     No Known Problems Paternal Aunt     No Known Problems Paternal Aunt     No Known Problems Paternal Aunt     No Known Problems Paternal Aunt     No Known Problems Paternal Uncle     No Known Problems Paternal Uncle     Sickle cell anemia Paternal Uncle     Coronary artery disease Maternal Grandmother     Hyperlipidemia Maternal Grandmother     Thyroid disease Maternal Grandmother     Asthma Maternal Grandmother     Arthritis Maternal Grandmother     Vision loss Maternal Grandmother     Breast cancer Maternal Grandmother         age unknown    Diabetes Maternal Grandmother     Hyperlipidemia Maternal Grandfather     Thyroid disease Maternal Grandfather     Diabetes Maternal Grandfather     No Known Problems Paternal Grandmother     No Known Problems Paternal Grandfather       Social History     Tobacco Use    Smoking status: Former     Current packs/day: 0.00     Types: Cigarettes     Quit date:      Years since quittin.8    Smokeless tobacco: Never   Vaping Use    Vaping status: Never Used   Substance Use Topics    Alcohol use: Not Currently     Comment: occasionally    Drug use: Never      E-Cigarette/Vaping    E-Cigarette Use Never User       E-Cigarette/Vaping Substances    Nicotine No     THC No     CBD No     Flavoring No     Other No     Unknown No       I have reviewed and agree with the history as documented.     38-year-old female presenting ED with a chief complaint of cough congestion chest pain over the past week.  Recent hysterectomy 1 month ago.  Patient had respiratory difficulty after hysterectomy was  worked up for PE.  Patient discharged from the hospital was doing well.  She endorses congestion cough and some shortness of breath over the past.  She does have a history of asthma.  Patient stated that she is also endorsing some rib pain when coughing.  She thinks is due to the amount of coughing that she has been doing.  She currently does not endorse any shortness of breath but does endorse a cough that has been consistent and not going away.  Been using over-the-counter medications with only mild relief.  Stated that she has been using her daily inhaler which does seem to give some relief.  She denies any sick contacts or recent illnesses.  Denies chills fevers diaphoresis.  Denies nausea vomiting diarrhea.  Denies any hemoptysis or recent travel.Patient denies any chest pain, shortness of breath, vomiting, diarrhea, chills, diaphoresis, fevers, loss of consciousness, syncope, urinary and bowel changes, abdominal pain, visual symptoms, vision loss, loss of function, loss of sensation, decreased oral intake, hemoptysis, hematochezia, hematemesis, melena, confusion.         Review of Systems   Constitutional:  Negative for activity change, appetite change, chills, diaphoresis, fatigue and fever.   HENT:  Positive for congestion and sore throat. Negative for ear discharge, ear pain, postnasal drip, rhinorrhea, sinus pressure and sinus pain.    Eyes:  Negative for photophobia and visual disturbance.   Respiratory:  Positive for cough, chest tightness and shortness of breath. Negative for wheezing and stridor.    Cardiovascular:  Positive for chest pain. Negative for palpitations.   Gastrointestinal:  Negative for abdominal distention, abdominal pain, constipation, diarrhea, nausea and vomiting.   Genitourinary:  Negative for difficulty urinating, dysuria, flank pain, frequency and hematuria.   Musculoskeletal:  Negative for arthralgias, back pain, joint swelling, myalgias, neck pain and neck stiffness.   Skin:   Negative for rash and wound.   Neurological:  Negative for dizziness, tremors, syncope, facial asymmetry, weakness, light-headedness, numbness and headaches.           Objective       ED Triage Vitals   Temperature Pulse Blood Pressure Respirations SpO2 Patient Position - Orthostatic VS   11/18/24 0739 11/18/24 0739 11/18/24 0739 11/18/24 0739 11/18/24 0739 11/18/24 0739   97.8 °F (36.6 °C) 104 118/56 20 97 % Sitting      Temp Source Heart Rate Source BP Location FiO2 (%) Pain Score    11/18/24 0739 11/18/24 0739 11/18/24 0739 -- 11/18/24 0840    Oral Monitor Left arm  4      Vitals      Date and Time Temp Pulse SpO2 Resp BP Pain Score FACES Pain Rating User   11/18/24 1039 -- 89 98 % 18 116/69 1 -- [facility]   11/18/24 0840 -- 96 97 % 18 112/67 4 -- [facility]   11/18/24 0739 97.8 °F (36.6 °C) 104 97 % 20 118/56 -- -- JAGDISH            Physical Exam  Constitutional:       General: She is not in acute distress.     Appearance: Normal appearance. She is not ill-appearing, toxic-appearing or diaphoretic.   HENT:      Head: Normocephalic.      Right Ear: Tympanic membrane, ear canal and external ear normal.      Left Ear: Tympanic membrane, ear canal and external ear normal.      Nose: Nose normal. No congestion or rhinorrhea.      Mouth/Throat:      Mouth: Mucous membranes are moist.      Pharynx: Oropharynx is clear. No oropharyngeal exudate or posterior oropharyngeal erythema.   Eyes:      General:         Right eye: No discharge.         Left eye: No discharge.      Extraocular Movements: Extraocular movements intact.      Conjunctiva/sclera: Conjunctivae normal.      Pupils: Pupils are equal, round, and reactive to light.   Cardiovascular:      Rate and Rhythm: Normal rate and regular rhythm.      Pulses: Normal pulses.   Pulmonary:      Effort: Pulmonary effort is normal. No respiratory distress.      Breath sounds: Normal breath sounds. No stridor. No wheezing, rhonchi or rales.   Chest:      Chest wall: Tenderness present.    Abdominal:      General: Bowel sounds are normal. There is no distension.      Palpations: Abdomen is soft.      Tenderness: There is no abdominal tenderness. There is no right CVA tenderness, left CVA tenderness, guarding or rebound.   Musculoskeletal:         General: No tenderness. Normal range of motion.      Cervical back: Neck supple. No rigidity or tenderness.   Lymphadenopathy:      Cervical: No cervical adenopathy.   Skin:     General: Skin is warm and dry.      Capillary Refill: Capillary refill takes less than 2 seconds.      Findings: No rash.   Neurological:      Mental Status: She is alert and oriented to person, place, and time.   Psychiatric:         Mood and Affect: Mood normal.         Results Reviewed       Procedure Component Value Units Date/Time    D-Dimer [[id number]]  (Abnormal) Collected: 11/18/24 0839    Lab Status: Final result Specimen: Blood from Arm, Right Updated: 11/18/24 0909     D-Dimer, Quant 1.14 ug/ml FEU     HS Troponin 0hr (reflex protocol) [[id number]]  (Normal) Collected: 11/18/24 0839    Lab Status: Final result Specimen: Blood from Arm, Right Updated: 11/18/24 0908     hs TnI 0hr <2 ng/L     Comprehensive metabolic panel [[id number]]  (Abnormal) Collected: 11/18/24 0839    Lab Status: Final result Specimen: Blood from Arm, Right Updated: 11/18/24 0904     Sodium 137 mmol/L      Potassium 3.8 mmol/L      Chloride 106 mmol/L      CO2 24 mmol/L      ANION GAP 7 mmol/L      BUN 11 mg/dL      Creatinine 0.66 mg/dL      Glucose 102 mg/dL      Calcium 8.6 mg/dL      AST 12 U/L      ALT 16 U/L      Alkaline Phosphatase 81 U/L      Total Protein 7.0 g/dL      Albumin 3.9 g/dL      Total Bilirubin 0.29 mg/dL      eGFR 113 ml/min/1.73sq m     Narrative:      National Kidney Disease Foundation guidelines for Chronic Kidney Disease (CKD):     Stage 1 with normal or high GFR (GFR > 90 mL/min/1.73 square meters)    Stage 2 Mild CKD (GFR = 60-89 mL/min/1.73 square meters)    Stage 3A  Moderate CKD (GFR = 45-59 mL/min/1.73 square meters)    Stage 3B Moderate CKD (GFR = 30-44 mL/min/1.73 square meters)    Stage 4 Severe CKD (GFR = 15-29 mL/min/1.73 square meters)    Stage 5 End Stage CKD (GFR <15 mL/min/1.73 square meters)  Note: GFR calculation is accurate only with a steady state creatinine    CBC and differential [[id number]] Collected: 11/18/24 0839    Lab Status: Final result Specimen: Blood from Arm, Right Updated: 11/18/24 0847     WBC 7.98 Thousand/uL      RBC 4.17 Million/uL      Hemoglobin 11.7 g/dL      Hematocrit 36.2 %      MCV 87 fL      MCH 28.1 pg      MCHC 32.3 g/dL      RDW 14.9 %      MPV 9.0 fL      Platelets 373 Thousands/uL      nRBC 0 /100 WBCs      Segmented % 59 %      Immature Grans % 0 %      Lymphocytes % 32 %      Monocytes % 6 %      Eosinophils Relative 2 %      Basophils Relative 1 %      Absolute Neutrophils 4.69 Thousands/µL      Absolute Immature Grans 0.02 Thousand/uL      Absolute Lymphocytes 2.53 Thousands/µL      Absolute Monocytes 0.51 Thousand/µL      Eosinophils Absolute 0.19 Thousand/µL      Basophils Absolute 0.04 Thousands/µL     FLU/COVID Rapid Antigen (30 min. TAT) - Preferred screening test in ED [[id number]]  (Normal) Collected: 11/18/24 0803    Lab Status: Final result Specimen: Nares from Nose Updated: 11/18/24 0826     SARS COV Rapid Antigen Negative     Influenza A Rapid Antigen Negative     Influenza B Rapid Antigen Negative    Narrative:      This test has been performed using the Quidel Jennifer 2 FLU+SARS Antigen test under the Emergency Use Authorization (EUA). This test has been validated by the  and verified by the performing laboratory. The Jennifer uses lateral flow immunofluorescent sandwich assay to detect SARS-COV, Influenza A and Influenza B Antigen.     The Quidel Jennifer 2 SARS Antigen test does not differentiate between SARS-CoV and SARS-CoV-2.     Negative results are presumptive and may be confirmed with a molecular assay, if  necessary, for patient management. Negative results do not rule out SARS-CoV-2 or influenza infection and should not be used as the sole basis for treatment or patient management decisions. A negative test result may occur if the level of antigen in a sample is below the limit of detection of this test.     Positive results are indicative of the presence of viral antigens, but do not rule out bacterial infection or co-infection with other viruses.     All test results should be used as an adjunct to clinical observations and other information available to the provider.    FOR PEDIATRIC PATIENTS - copy/paste COVID Guidelines URL to browser: [email or web address]            CTA chest pe study   Final Interpretation by Antonia Hannah MD (11/18 1008)      No pulmonary embolus.      Lungs clear.            Workstation performed: [id number]         XR chest 2 views   Final Interpretation by Jose Pratt MD (11/18 1033)      No acute cardiopulmonary disease.            Resident: Aristeo Mckeon I, the attending radiologist, have reviewed the images and agree with the final report above.      Workstation performed: [id number]             ECG 12 Lead Documentation Only    Date/Time: 11/18/2024 4:55 PM    Performed by: Raji Cortes PA-C  Authorized by: Raji Crotes PA-C    Indications / Diagnosis:  Chest tightness  ECG reviewed by me, the ED Provider: yes    Patient location:  ED  Previous ECG:     Previous ECG:  Compared to current    Similarity:  Changes noted    Comparison to cardiac monitor: Yes    Interpretation:     Interpretation: normal    Rate:     ECG rate:  90    ECG rate assessment: normal    Rhythm:     Rhythm: sinus rhythm    Ectopy:     Ectopy: none    QRS:     QRS axis:  Normal    QRS intervals:  Normal  Conduction:     Conduction: normal    ST segments:     ST segments:  Normal  T waves:     T waves: normal        ED Medication  and Procedure Management   Prior to Admission Medications   Prescriptions Last Dose Informant Patient Reported? Taking?   Alcohol Swabs (Pharmacist Choice Alcohol) PADS   Yes No   Black Cohosh-SoyIsoflav-C Quad 40- MG CAPS   No No   Sig: Take 1 Dose by mouth in the morning   Diclofenac Sodium (VOLTAREN) 1 %   No No   Sig: Apply 2 g topically 4 (four) times a day   Fluticasone Furoate-Vilanterol 100-25 mcg/actuation inhaler   No No   Sig: Inhale 1 puff daily Rinse mouth after use.   Semaglutide-Weight Management (WEGOVY) 0.5 MG/0.5ML   No No   Sig: Inject 0.5 mL (0.5 mg total) under the skin once a week   acetaminophen (TYLENOL) 500 mg tablet   No No   Sig: Take 2 tablets (1,000 mg total) by mouth every 8 (eight) hours as needed for mild pain   albuterol (2.5 mg/3 mL) 0.083 % nebulizer solution  Self No No   Sig: Take 3 mL (2.5 mg total) by nebulization every 6 (six) hours as needed for wheezing or shortness of breath   celecoxib (CeleBREX) 200 mg capsule   No No   Sig: Take 1 capsule (200 mg total) by mouth 2 (two) times a day   cyclobenzaprine (FLEXERIL) 5 mg tablet   No No   Sig: Take 1 tablet (5 mg total) by mouth daily at bedtime   Patient taking differently: Take 5 mg by mouth daily at bedtime PRN   ergocalciferol (VITAMIN D2) 50,000 units   No No   Sig: Take 1 capsule (50,000 Units total) by mouth once a week   famotidine (PEPCID) 20 mg tablet   No No   Sig: Take 1 tablet (20 mg total) by mouth 2 (two) times a day   Patient taking differently: Take 20 mg by mouth 2 (two) times a day PRN   fexofenadine-pseudoephedrine (Allegra-D Allergy & Congestion) 180-240 MG per 24 hr tablet   No No   Sig: Take 1 tablet by mouth daily   gabapentin (Neurontin) 100 mg capsule   No No   Sig: Take 1 capsule (100 mg total) by mouth 3 (three) times a day During a pain flare-up   Patient taking differently: Take 100 mg by mouth 3 (three) times a day During a pain flare-up PRN   ibuprofen (MOTRIN) 600 mg tablet   No No   Sig:  Take 1 tablet (600 mg total) by mouth every 6 (six) hours as needed for mild pain   levalbuterol (Xopenex HFA) 45 mcg/act inhaler   No No   Sig: Inhale 1-2 puffs every 4 (four) hours as needed for wheezing or shortness of breath   omeprazole (PriLOSEC) 40 MG capsule  Self No No   Sig: Take 1 capsule (40 mg total) by mouth daily   oxyCODONE (Roxicodone) 5 immediate release tablet   No No   Sig: Take 1 tablet (5 mg total) by mouth every 6 (six) hours as needed for severe pain Max Daily Amount: 20 mg   Patient not taking: Reported on 11/12/2024      Facility-Administered Medications: None     Discharge Medication List as of 11/18/2024 10:25 AM        START taking these medications    Details   benzonatate (TESSALON PERLES) 100 mg capsule Take 1 capsule (100 mg total) by mouth every 8 (eight) hours, Starting Mon 11/18/2024, Normal      fluticasone (FLONASE) 50 mcg/act nasal spray 1 spray into each nostril daily, Starting Mon 11/18/2024, Normal           CONTINUE these medications which have NOT CHANGED    Details   acetaminophen (TYLENOL) 500 mg tablet Take 2 tablets (1,000 mg total) by mouth every 8 (eight) hours as needed for mild pain, Starting Wed 10/30/2024, No Print      albuterol (2.5 mg/3 mL) 0.083 % nebulizer solution Take 3 mL (2.5 mg total) by nebulization every 6 (six) hours as needed for wheezing or shortness of breath, Starting Thu 10/26/2023, Normal      Alcohol Swabs (Pharmacist Choice Alcohol) PADS Historical Med      Black Cohosh-SoyIsoflav-C Quad 40- MG CAPS Take 1 Dose by mouth in the morning, Starting Tue 11/12/2024, Normal      celecoxib (CeleBREX) 200 mg capsule Take 1 capsule (200 mg total) by mouth 2 (two) times a day, Starting Wed 7/10/2024, Normal      cyclobenzaprine (FLEXERIL) 5 mg tablet Take 1 tablet (5 mg total) by mouth daily at bedtime, Starting Wed 1/10/2024, Normal      Diclofenac Sodium (VOLTAREN) 1 % Apply 2 g topically 4 (four) times a day, Starting Wed 1/10/2024, Normal       ergocalciferol (VITAMIN D2) 50,000 units Take 1 capsule (50,000 Units total) by mouth once a week, Starting Wed 7/10/2024, Normal      famotidine (PEPCID) 20 mg tablet Take 1 tablet (20 mg total) by mouth 2 (two) times a day, Starting Fri 3/8/2024, Normal      fexofenadine-pseudoephedrine (Allegra-D Allergy & Congestion) 180-240 MG per 24 hr tablet Take 1 tablet by mouth daily, Starting Tue 5/21/2024, Normal      Fluticasone Furoate-Vilanterol 100-25 mcg/actuation inhaler Inhale 1 puff daily Rinse mouth after use., Starting Fri 5/31/2024, Until Wed 11/27/2024, Normal      gabapentin (Neurontin) 100 mg capsule Take 1 capsule (100 mg total) by mouth 3 (three) times a day During a pain flare-up, Starting Wed 7/10/2024, Normal      ibuprofen (MOTRIN) 600 mg tablet Take 1 tablet (600 mg total) by mouth every 6 (six) hours as needed for mild pain, Starting Wed 10/30/2024, Normal      levalbuterol (Xopenex HFA) 45 mcg/act inhaler Inhale 1-2 puffs every 4 (four) hours as needed for wheezing or shortness of breath, Starting Wed 5/22/2024, Normal      omeprazole (PriLOSEC) 40 MG capsule Take 1 capsule (40 mg total) by mouth daily, Starting u 10/26/2023, Normal      oxyCODONE (Roxicodone) 5 immediate release tablet Take 1 tablet (5 mg total) by mouth every 6 (six) hours as needed for severe pain Max Daily Amount: 20 mg, Starting Wed 10/30/2024, Normal      Semaglutide-Weight Management (WEGOVY) 0.5 MG/0.5ML Inject 0.5 mL (0.5 mg total) under the skin once a week, Starting Tue 9/3/2024, Normal           No discharge procedures on file.  ED SEPSIS DOCUMENTATION   Time reflects when diagnosis was documented in both MDM as applicable and the Disposition within this note       Time User Action Codes Description Comment    11/18/2024 10:24 AM Raji Cortes Add [R05.1] Acute cough     11/18/2024 10:24 AM Raji Cortes Add [J06.9] Viral URI with cough                  Raji Cortes PA-C  11/18/24 1701

## 2024-11-18 NOTE — DISCHARGE INSTRUCTIONS
Patient advised to follow-up PCP for today's ED visit.  Patient vies return to ED with any worsening symptoms explained on discharge.    Patient was advised to return to the ED with any worsening symptoms that were explained on discharge including but not limited to chest pain, shortness of breath, irretractable vomiting or diarrhea, vision loss, loss of function, loss of sensation, syncope, hemoptysis, hematochezia, hematemesis, melena, decreased oral intake, feeling ill.

## 2024-11-18 NOTE — Clinical Note
Renee Wall was seen and treated in our emergency department on 11/18/2024.                Diagnosis: Viral illness    Renee  may return to work on return date.    She may return on this date: 11/19/2024         If you have any questions or concerns, please don't hesitate to call.      Raji Cortes PA-C    ______________________________           _______________          _______________  Hospital Representative                              Date                                Time

## 2024-11-22 ENCOUNTER — OFFICE VISIT (OUTPATIENT)
Age: 39
End: 2024-11-22
Payer: COMMERCIAL

## 2024-11-22 ENCOUNTER — TELEPHONE (OUTPATIENT)
Age: 39
End: 2024-11-22

## 2024-11-22 VITALS
OXYGEN SATURATION: 98 % | HEIGHT: 60 IN | TEMPERATURE: 98.3 F | BODY MASS INDEX: 43.31 KG/M2 | DIASTOLIC BLOOD PRESSURE: 80 MMHG | WEIGHT: 220.6 LBS | HEART RATE: 82 BPM | SYSTOLIC BLOOD PRESSURE: 110 MMHG

## 2024-11-22 DIAGNOSIS — J45.30 MILD PERSISTENT ASTHMA WITHOUT COMPLICATION: ICD-10-CM

## 2024-11-22 DIAGNOSIS — J30.2 SEASONAL ALLERGIES: ICD-10-CM

## 2024-11-22 DIAGNOSIS — F51.01 PRIMARY INSOMNIA: ICD-10-CM

## 2024-11-22 DIAGNOSIS — F32.0 MILD SINGLE CURRENT EPISODE OF MAJOR DEPRESSIVE DISORDER (HCC): ICD-10-CM

## 2024-11-22 DIAGNOSIS — M54.50 CHRONIC BILATERAL LOW BACK PAIN WITHOUT SCIATICA: ICD-10-CM

## 2024-11-22 DIAGNOSIS — J45.30 MILD PERSISTENT ASTHMA, UNSPECIFIED WHETHER COMPLICATED: Primary | ICD-10-CM

## 2024-11-22 DIAGNOSIS — R73.03 PREDIABETES: ICD-10-CM

## 2024-11-22 DIAGNOSIS — G89.29 CHRONIC BILATERAL LOW BACK PAIN WITHOUT SCIATICA: ICD-10-CM

## 2024-11-22 DIAGNOSIS — K21.9 GASTROESOPHAGEAL REFLUX DISEASE WITHOUT ESOPHAGITIS: ICD-10-CM

## 2024-11-22 PROCEDURE — 99214 OFFICE O/P EST MOD 30 MIN: CPT | Performed by: FAMILY MEDICINE

## 2024-11-22 RX ORDER — BUDESONIDE AND FORMOTEROL FUMARATE DIHYDRATE 160; 4.5 UG/1; UG/1
2 AEROSOL RESPIRATORY (INHALATION) 2 TIMES DAILY
Qty: 10.2 G | Refills: 5 | Status: SHIPPED | OUTPATIENT
Start: 2024-11-22

## 2024-11-22 RX ORDER — LEVALBUTEROL TARTRATE 45 UG/1
1-2 AEROSOL, METERED ORAL EVERY 4 HOURS PRN
Qty: 15 G | Refills: 0 | Status: SHIPPED | OUTPATIENT
Start: 2024-11-22

## 2024-11-22 RX ORDER — FEXOFENADINE HCL AND PSEUDOEPHEDRINE HCL 180; 240 MG/1; MG/1
1 TABLET, EXTENDED RELEASE ORAL DAILY
Qty: 90 TABLET | Refills: 1 | Status: SHIPPED | OUTPATIENT
Start: 2024-11-22

## 2024-11-22 NOTE — PROGRESS NOTES
Assessment/Plan: CMP CBC D-dimer troponin levels reviewed at present time.  CAT scan of the chest reviewed also.  Patient will be restarting Wegovy per routine.  Patient will continue with current treatment for asthma, GERD, arthritis and back pain.  Patient will modify diet regarding prediabetic state.  Continue with current treatment for allergies.  Refills given.  Patient will follow-up in 6 months or as needed.  Patient wishes to hold off with flu shot at this time.       Diagnoses and all orders for this visit:    Mild persistent asthma, unspecified whether complicated  -     budesonide-formoterol (SYMBICORT) 160-4.5 mcg/act inhaler; Inhale 2 puffs 2 (two) times a day Rinse mouth after use.    Gastroesophageal reflux disease without esophagitis    Mild single current episode of major depressive disorder (HCC)    Chronic bilateral low back pain without sciatica    Prediabetes    Primary insomnia    Seasonal allergies  -     fexofenadine-pseudoephedrine (Allegra-D Allergy & Congestion) 180-240 MG per 24 hr tablet; Take 1 tablet by mouth daily    Mild persistent asthma without complication  -     levalbuterol (Xopenex HFA) 45 mcg/act inhaler; Inhale 1-2 puffs every 4 (four) hours as needed for wheezing or shortness of breath            Subjective:        Patient ID: Renee Wall is a 38 y.o. female.      Patient is here to follow-up on multiple chronic issues with history of asthma, GERD, chronic joint pains of back pain and prediabetic state.  Patient was in the ER recently.  Patient with ongoing cough which is improving.  Patient using Tessalon Perles with improvement.  No fevers at this time.  Back pain has been stable.  Patient does get joint pains with weather changes.  GERD has been stable overall.  No wheezing or asthmatic issues.          The following portions of the patient's history were reviewed and updated as appropriate: allergies, current medications, past family history, past medical history, past  social history, past surgical history and problem list.      Review of Systems   Constitutional: Negative.    HENT: Negative.     Eyes: Negative.    Respiratory:  Positive for cough.    Cardiovascular: Negative.    Gastrointestinal: Negative.    Endocrine: Negative.    Genitourinary: Negative.    Musculoskeletal: Negative.    Skin: Negative.    Allergic/Immunologic: Negative.    Neurological: Negative.    Hematological: Negative.    Psychiatric/Behavioral: Negative.             Objective:        Depression Screening and Follow-up Plan: Patient was screened for depression during today's encounter. They screened negative with a PHQ-9 score of 0.            /80 (BP Location: Right arm, Patient Position: Sitting, Cuff Size: Large)   Pulse 82   Temp 98.3 °F (36.8 °C) (Temporal)   Ht 5' (1.524 m)   Wt 100 kg (220 lb 9.6 oz)   LMP 10/23/2024 (Approximate)   SpO2 98%   BMI 43.08 kg/m²          Physical Exam  Vitals and nursing note reviewed.   Constitutional:       General: She is not in acute distress.     Appearance: Normal appearance. She is not ill-appearing, toxic-appearing or diaphoretic.   HENT:      Head: Normocephalic and atraumatic.      Right Ear: Tympanic membrane, ear canal and external ear normal. There is no impacted cerumen.      Left Ear: Tympanic membrane, ear canal and external ear normal. There is no impacted cerumen.      Nose: Nose normal. No congestion or rhinorrhea.      Mouth/Throat:      Mouth: Mucous membranes are moist.      Pharynx: No oropharyngeal exudate or posterior oropharyngeal erythema.   Eyes:      General: No scleral icterus.        Right eye: No discharge.         Left eye: No discharge.      Extraocular Movements: Extraocular movements intact.      Conjunctiva/sclera: Conjunctivae normal.      Pupils: Pupils are equal, round, and reactive to light.   Neck:      Vascular: No carotid bruit.   Cardiovascular:      Rate and Rhythm: Normal rate and regular rhythm.      Pulses:  Normal pulses.      Heart sounds: Normal heart sounds. No murmur heard.     No friction rub. No gallop.   Pulmonary:      Effort: Pulmonary effort is normal. No respiratory distress.      Breath sounds: Normal breath sounds. No stridor. No wheezing, rhonchi or rales.   Chest:      Chest wall: No tenderness.   Abdominal:      General: Abdomen is flat. Bowel sounds are normal. There is no distension.      Palpations: Abdomen is soft.      Tenderness: There is no abdominal tenderness. There is no guarding or rebound.   Musculoskeletal:         General: No swelling, tenderness, deformity or signs of injury. Normal range of motion.      Cervical back: Normal range of motion and neck supple. No rigidity. No muscular tenderness.      Right lower leg: No edema.      Left lower leg: No edema.   Lymphadenopathy:      Cervical: No cervical adenopathy.   Skin:     General: Skin is warm and dry.      Capillary Refill: Capillary refill takes less than 2 seconds.      Coloration: Skin is not jaundiced.      Findings: No bruising, erythema, lesion or rash.   Neurological:      Mental Status: She is alert and oriented to person, place, and time. Mental status is at baseline.      Cranial Nerves: No cranial nerve deficit.      Sensory: No sensory deficit.      Motor: No weakness.      Coordination: Coordination normal.      Gait: Gait normal.   Psychiatric:         Mood and Affect: Mood normal.         Behavior: Behavior normal.         Thought Content: Thought content normal.         Judgment: Judgment normal.

## 2024-11-25 NOTE — TELEPHONE ENCOUNTER
PA for budesonide-formoterol 160-4.5mcg/act DENIED    Reason:    Message sent to office clinical pool Yes    Denial letter scanned into Media Yes    Appeal started No (Provider will need to decide if appeal is warranted and send clinical documentation to Prior Authorization Team for initiation.)    **Please follow up with your patient regarding denial and next steps**

## 2024-11-25 NOTE — TELEPHONE ENCOUNTER
PA for budesonide-formoterol 160-4.5mcg/act SUBMITTED to [facility]    via    []CMM-KEY:   [x]Surescripts-Case ID # [id number]   []Availity-Auth ID # NDC #   []Faxed to plan   []Other website   []Phone call Case ID #     [x]PA sent as URGENT    All office notes, labs and other pertaining documents and studies sent. Clinical questions answered. Awaiting determination from insurance company.     Turnaround time for your insurance to make a decision on your Prior Authorization can take 7-21 business days.

## 2024-11-26 DIAGNOSIS — E66.01 OBESITY, CLASS III, BMI 40-49.9 (MORBID OBESITY) (HCC): ICD-10-CM

## 2024-11-26 DIAGNOSIS — J45.30 MILD PERSISTENT ASTHMA, UNSPECIFIED WHETHER COMPLICATED: Primary | ICD-10-CM

## 2024-11-26 RX ORDER — FLUTICASONE PROPIONATE AND SALMETEROL 250; 50 UG/1; UG/1
1 POWDER RESPIRATORY (INHALATION) 2 TIMES DAILY
Qty: 60 BLISTER | Refills: 5 | Status: SHIPPED | OUTPATIENT
Start: 2024-11-26 | End: 2025-05-25

## 2024-11-27 RX ORDER — SEMAGLUTIDE 1 MG/.5ML
INJECTION, SOLUTION SUBCUTANEOUS
Qty: 2 ML | Refills: 0 | Status: SHIPPED | OUTPATIENT
Start: 2024-11-27

## 2024-12-17 ENCOUNTER — OFFICE VISIT (OUTPATIENT)
Dept: OBGYN CLINIC | Facility: MEDICAL CENTER | Age: 39
End: 2024-12-17

## 2024-12-17 VITALS
WEIGHT: 216.9 LBS | SYSTOLIC BLOOD PRESSURE: 110 MMHG | BODY MASS INDEX: 42.58 KG/M2 | DIASTOLIC BLOOD PRESSURE: 65 MMHG | HEIGHT: 60 IN

## 2024-12-17 DIAGNOSIS — Z90.710 S/P LAPAROSCOPIC HYSTERECTOMY: ICD-10-CM

## 2024-12-17 DIAGNOSIS — Z09 POSTOPERATIVE EXAMINATION: Primary | ICD-10-CM

## 2024-12-17 PROCEDURE — 99024 POSTOP FOLLOW-UP VISIT: CPT | Performed by: OBSTETRICS & GYNECOLOGY

## 2024-12-17 NOTE — PROGRESS NOTES
Assessment:  39 y.o.  who is POD#6wk from [facility], BS, cysto. She is doing well post-op.    Plan:  Diagnoses and all orders for this visit:    Postoperative examination  S/P laparoscopic hysterectomy    - Routine postop care  - May resume regular activity  - Return for yearly    __________________________________________________________________    Real Wall is a 39 y.o.  who presents POD#6wk from [facility], BS, cysto for adenomyosis. .     Patient reports doing well postop. She notes her pain is resolved. Still notices a bit of cramping when would otherwise expect cycle, mild. Her incision is healing well; she denies redness or drainage. Her bowel function is normal and she is having regular BM's. She has returned to most of her normal activities. She has not yet returned to sexual activity.           Objective  /65   Ht 5' (1.524 m)   Wt 98.4 kg (216 lb 14.4 oz)   BMI 42.36 kg/m²      Physical Exam:  Physical Exam  Constitutional:       General: She is not in acute distress.     Appearance: Normal appearance. She is not ill-appearing, toxic-appearing or diaphoretic.   Eyes:      General: No scleral icterus.        Right eye: No discharge.         Left eye: No discharge.      Conjunctiva/sclera: Conjunctivae normal.   Cardiovascular:      Rate and Rhythm: Normal rate.   Pulmonary:      Effort: Pulmonary effort is normal. No respiratory distress.   Abdominal:      General: There is no distension.      Palpations: There is no mass.      Tenderness: There is no abdominal tenderness. There is no guarding or rebound.      Hernia: No hernia is present.      Comments: Incisions clean, dry, and intact. No erythema or drainage   Genitourinary:     General: Normal vulva.      Exam position: Lithotomy position.      Labia:         Right: No rash, tenderness or lesion.         Left: No rash, tenderness or lesion.       Vagina: No signs of injury. No vaginal discharge, erythema, tenderness or  bleeding.      Cervix: No cervical motion tenderness (well healed vaginal cuff without erythema, granulation, or residual suture).   Musculoskeletal:         General: No swelling.   Skin:     General: Skin is warm and dry.      Coloration: Skin is not jaundiced or pale.      Findings: No bruising or erythema.   Neurological:      Mental Status: She is alert.   Psychiatric:         Mood and Affect: Mood normal.         Behavior: Behavior normal.         Thought Content: Thought content normal.         Judgment: Judgment normal.

## 2024-12-23 DIAGNOSIS — E66.01 OBESITY, CLASS III, BMI 40-49.9 (MORBID OBESITY) (HCC): Primary | ICD-10-CM

## 2024-12-24 ENCOUNTER — TELEPHONE (OUTPATIENT)
Dept: BARIATRICS | Facility: CLINIC | Age: 39
End: 2024-12-24

## 2024-12-27 RX ORDER — ISOPROPYL ALCOHOL 0.7 ML/ML
SWAB TOPICAL
Qty: 100 EACH | Refills: 1 | Status: SHIPPED | OUTPATIENT
Start: 2024-12-27

## 2025-01-08 ENCOUNTER — TELEPHONE (OUTPATIENT)
Dept: BARIATRICS | Facility: CLINIC | Age: 40
End: 2025-01-08

## 2025-02-05 ENCOUNTER — HOSPITAL ENCOUNTER (EMERGENCY)
Facility: HOSPITAL | Age: 40
Discharge: HOME | End: 2025-02-05
Attending: EMERGENCY MEDICINE
Payer: COMMERCIAL

## 2025-02-05 ENCOUNTER — APPOINTMENT (EMERGENCY)
Dept: RADIOLOGY | Facility: HOSPITAL | Age: 40
End: 2025-02-05
Payer: COMMERCIAL

## 2025-02-05 VITALS
SYSTOLIC BLOOD PRESSURE: 127 MMHG | DIASTOLIC BLOOD PRESSURE: 66 MMHG | WEIGHT: 224.43 LBS | RESPIRATION RATE: 20 BRPM | OXYGEN SATURATION: 97 % | TEMPERATURE: 97.7 F | HEART RATE: 98 BPM | BODY MASS INDEX: 43.83 KG/M2

## 2025-02-05 DIAGNOSIS — J45.909 ASTHMA: ICD-10-CM

## 2025-02-05 DIAGNOSIS — J20.9 ACUTE BRONCHITIS: Primary | ICD-10-CM

## 2025-02-05 PROCEDURE — 99283 EMERGENCY DEPT VISIT LOW MDM: CPT

## 2025-02-05 PROCEDURE — 71046 X-RAY EXAM CHEST 2 VIEWS: CPT

## 2025-02-05 PROCEDURE — 99284 EMERGENCY DEPT VISIT MOD MDM: CPT | Performed by: EMERGENCY MEDICINE

## 2025-02-05 RX ORDER — BENZONATATE 100 MG/1
100 CAPSULE ORAL 3 TIMES DAILY PRN
Qty: 21 CAPSULE | Refills: 0 | Status: SHIPPED | OUTPATIENT
Start: 2025-02-05 | End: 2025-02-12

## 2025-02-05 RX ORDER — AZITHROMYCIN 250 MG/1
TABLET, FILM COATED ORAL
Qty: 6 TABLET | Refills: 0 | Status: SHIPPED | OUTPATIENT
Start: 2025-02-05 | End: 2025-02-09

## 2025-02-05 RX ORDER — BENZONATATE 100 MG/1
100 CAPSULE ORAL ONCE
Status: COMPLETED | OUTPATIENT
Start: 2025-02-05 | End: 2025-02-05

## 2025-02-05 RX ORDER — PREDNISONE 20 MG/1
40 TABLET ORAL DAILY
Qty: 8 TABLET | Refills: 0 | Status: SHIPPED | OUTPATIENT
Start: 2025-02-05 | End: 2025-02-09

## 2025-02-05 RX ADMIN — PREDNISONE 50 MG: 20 TABLET ORAL at 10:14

## 2025-02-05 RX ADMIN — BENZONATATE 100 MG: 100 CAPSULE ORAL at 10:14

## 2025-02-05 NOTE — ED PROVIDER NOTES
Time reflects when diagnosis was documented in both MDM as applicable and the Disposition within this note       Time User Action Codes Description Comment    2/5/2025 10:09 AM Roland Oquendo Add [J20.9] Acute bronchitis     2/5/2025 10:09 AM Roland Oquendo Add [J45.909] Asthma           ED Disposition       ED Disposition   Discharge    Condition   Stable    Date/Time   Wed Feb 5, 2025 10:09 AM    Comment   Renee Wall discharge to home/self care.                   Assessment & Plan       Medical Decision Making  Like illness versus pneumonia-will do chest x-ray to rule out pneumonia.  If negative will treat for acute bronchitis.    Amount and/or Complexity of Data Reviewed  Radiology: ordered. Decision-making details documented in ED Course.    Risk  Prescription drug management.        ED Course as of 02/05/25 1031   Wed Feb 05, 2025   1007 XR chest 2 views   1008 Cxr neg for pneumonia, will tx for acute bronchitis.        Medications   predniSONE tablet 50 mg (50 mg Oral Given 2/5/25 1014)   benzonatate (TESSALON PERLES) capsule 100 mg (100 mg Oral Given 2/5/25 1014)       ED Risk Strat Scores                          SBIRT 20yo+      Flowsheet Row Most Recent Value   Initial Alcohol Screen: US AUDIT-C     1. How often do you have a drink containing alcohol? 0 Filed at: 02/05/2025 0817   2. How many drinks containing alcohol do you have on a typical day you are drinking?  0 Filed at: 02/05/2025 0817   3b. FEMALE Any Age, or MALE 65+: How often do you have 4 or more drinks on one occassion? 0 Filed at: 02/05/2025 0817   Audit-C Score 0 Filed at: 02/05/2025 0817   MARIALUISA: How many times in the past year have you...    Used an illegal drug or used a prescription medication for non-medical reasons? 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     Depression     GERD (gastroesophageal reflux disease)     Obesity     Scoliosis     Visual impairment       Past Surgical History:   Procedure Laterality Date    DENTAL SURGERY      teeth    HERNIA REPAIR      VA CYSTOURETHROSCOPY N/A 10/30/2024    Procedure: CYSTO;  Surgeon: Fatmata Perez MD;  Location: [facility];  Service: Gynecology    VA LAPS TOTAL HYSTERECT 250 GM/< W/RMVL TUBE/OVARY Bilateral 10/30/2024    Procedure: LTH, BILATERAL SALPINGECTOMY, CYSTO, EUA;  Surgeon: Fatmata Perez MD;  Location: [facility];  Service: Gynecology      Family History   Problem Relation Age of Onset    Heart disease Mother     Thyroid disease Mother     Arthritis Mother     Diabetes Mother     Heart attack Mother     Depression Father     Arthritis Father     No Known Problems Sister     No Known Problems Brother     No Known Problems Daughter     No Known Problems Son     Ovarian cancer Maternal Aunt         50s    Diabetes Maternal Aunt     Colon cancer Maternal Aunt     Diabetes Maternal Aunt     Diabetes Maternal Uncle     Diabetes Maternal Uncle     Sickle cell anemia Paternal Aunt     Sickle cell anemia Paternal Aunt     No Known Problems Paternal Aunt     No Known Problems Paternal Aunt     No Known Problems Paternal Aunt     No Known Problems Paternal Aunt     No Known Problems Paternal Uncle     No Known Problems Paternal Uncle     Sickle cell anemia Paternal Uncle     Coronary artery disease Maternal Grandmother     Hyperlipidemia Maternal Grandmother     Thyroid disease Maternal Grandmother     Asthma Maternal Grandmother     Arthritis Maternal Grandmother     Vision loss Maternal Grandmother     Breast cancer Maternal Grandmother         age unknown    Diabetes Maternal Grandmother     Hyperlipidemia Maternal Grandfather     Thyroid disease Maternal Grandfather      Diabetes Maternal Grandfather     No Known Problems Paternal Grandmother     No Known Problems Paternal Grandfather       Social History     Tobacco Use    Smoking status: Former     Current packs/day: 0.00     Types: Cigarettes     Quit date:      Years since quittin.    Smokeless tobacco: Never   Vaping Use    Vaping status: Never Used   Substance Use Topics    Alcohol use: Not Currently     Comment: occasionally    Drug use: Never      E-Cigarette/Vaping    E-Cigarette Use Never User       E-Cigarette/Vaping Substances    Nicotine No     THC No     CBD No     Flavoring No     Other No     Unknown No       I have reviewed and agree with the history as documented.       Flu Symptoms  Presenting symptoms: cough, fever, rhinorrhea, shortness of breath and sore throat    Presenting symptoms: no diarrhea, no fatigue, no myalgias, no nausea and no vomiting    Severity:  Moderate  Onset quality:  Gradual  Duration:  10 days  Progression:  Unchanged  Chronicity:  New  Relieved by:  Nothing  Worsened by:  Nothing  Associated symptoms: chills and nasal congestion    Associated symptoms: no ear pain        Review of Systems   Constitutional:  Positive for chills and fever. Negative for activity change, appetite change and fatigue.   HENT:  Positive for congestion, postnasal drip, rhinorrhea and sore throat. Negative for dental problem and ear pain.    Eyes:  Negative for pain and redness.   Respiratory:  Positive for cough and shortness of breath. Negative for chest tightness and wheezing.    Cardiovascular:  Negative for chest pain and palpitations.   Gastrointestinal:  Negative for abdominal pain, blood in stool, constipation, diarrhea, nausea and vomiting.   Endocrine: Negative for cold intolerance and heat intolerance.   Genitourinary:  Negative for dysuria, frequency and hematuria.   Musculoskeletal:  Negative for arthralgias and myalgias.   Skin:  Negative for color change, pallor and rash.   Neurological:   Negative for weakness and numbness.   Hematological:  Does not bruise/bleed easily.   Psychiatric/Behavioral:  Negative for agitation, hallucinations and suicidal ideas.            Objective       ED Triage Vitals   Temperature Pulse Blood Pressure Respirations SpO2 Patient Position - Orthostatic VS   02/05/25 0815 02/05/25 0815 02/05/25 0817 02/05/25 0815 02/05/25 0815 02/05/25 0815   97.7 °F (36.5 °C) 100 125/61 19 99 % Sitting      Temp Source Heart Rate Source BP Location FiO2 (%) Pain Score    02/05/25 0815 02/05/25 0815 02/05/25 0815 -- --    Oral Monitor Right arm        Vitals      Date and Time Temp Pulse SpO2 Resp BP Pain Score FACES Pain Rating User   02/05/25 0956 -- 98 97 % 20 127/66 -- -- JK   02/05/25 0817 -- -- -- -- 125/61 -- -- JAGDISH   02/05/25 0815 97.7 °F (36.5 °C) 100 99 % 19 -- -- -- JAGDISH            Physical Exam  Constitutional:       Appearance: She is well-developed.   HENT:      Head: Normocephalic and atraumatic.      Nose: Congestion and rhinorrhea present.      Mouth/Throat:      Pharynx: Posterior oropharyngeal erythema present. No oropharyngeal exudate.   Eyes:      Pupils: Pupils are equal, round, and reactive to light.   Neck:      Vascular: No JVD.      Trachea: No tracheal deviation.   Cardiovascular:      Rate and Rhythm: Normal rate and regular rhythm.   Pulmonary:      Effort: Pulmonary effort is normal. No tachypnea, accessory muscle usage or respiratory distress.      Breath sounds: Normal breath sounds.   Abdominal:      General: There is no distension.      Palpations: There is no mass.      Tenderness: There is no abdominal tenderness.   Musculoskeletal:      Cervical back: Normal range of motion and neck supple.      Right lower leg: Normal. No edema.      Left lower leg: Normal. No edema.   Skin:     General: Skin is warm.      Capillary Refill: Capillary refill takes less than 2 seconds.   Neurological:      Mental Status: She is alert and oriented to person, place, and time.    Psychiatric:         Behavior: Behavior normal.         Results Reviewed       None            XR chest 2 views   ED Interpretation by Roland Oquendo MD (02/05 1008)   Independently reviewed; No acute abnormality.       Final Interpretation by Apolinar Neal MD (02/05 1002)      No acute cardiopulmonary disease.            Resident: Aristeo Mckeon I, the attending radiologist, have reviewed the images and agree with the final report above.      Workstation performed: [id number]             Procedures    ED Medication and Procedure Management   Prior to Admission Medications   Prescriptions Last Dose Informant Patient Reported? Taking?   Alcohol Swabs (Pharmacist Choice Alcohol) PADS   No No   Sig: AS DIRECTED   Black Cohosh-SoyIsoflav-C Quad 40- MG CAPS   No No   Sig: Take 1 Dose by mouth in the morning   Patient not taking: Reported on 12/17/2024   Diclofenac Sodium (VOLTAREN) 1 %   No No   Sig: Apply 2 g topically 4 (four) times a day   Fluticasone-Salmeterol (Advair) 250-50 mcg/dose inhaler   No No   Sig: Inhale 1 puff 2 (two) times a day Rinse mouth after use.   Semaglutide-Weight Management (WEGOVY) 0.5 MG/0.5ML   No No   Sig: Inject 0.5 mL (0.5 mg total) under the skin once a week   Patient not taking: Reported on 12/17/2024   Wegovy 1 MG/0.5ML   No No   Sig: INJECT 0.5 ML (1 MG TOTAL) UNDER THE SKIN ONCE A WEEK   acetaminophen (TYLENOL) 500 mg tablet   No No   Sig: Take 2 tablets (1,000 mg total) by mouth every 8 (eight) hours as needed for mild pain   albuterol (2.5 mg/3 mL) 0.083 % nebulizer solution  Self No No   Sig: Take 3 mL (2.5 mg total) by nebulization every 6 (six) hours as needed for wheezing or shortness of breath   benzonatate (TESSALON PERLES) 100 mg capsule   No No   Sig: Take 1 capsule (100 mg total) by mouth every 8 (eight) hours   Patient not taking: Reported on 12/17/2024   budesonide-formoterol (SYMBICORT) 160-4.5 mcg/act inhaler   No No   Sig: Inhale 2 puffs 2 (two) times  a day Rinse mouth after use.   celecoxib (CeleBREX) 200 mg capsule   No No   Sig: Take 1 capsule (200 mg total) by mouth 2 (two) times a day   cyclobenzaprine (FLEXERIL) 5 mg tablet   No No   Sig: Take 1 tablet (5 mg total) by mouth daily at bedtime   ergocalciferol (VITAMIN D2) 50,000 units   No No   Sig: Take 1 capsule (50,000 Units total) by mouth once a week   famotidine (PEPCID) 20 mg tablet   No No   Sig: Take 1 tablet (20 mg total) by mouth 2 (two) times a day   fexofenadine-pseudoephedrine (Allegra-D Allergy & Congestion) 180-240 MG per 24 hr tablet   No No   Sig: Take 1 tablet by mouth daily   fluticasone (FLONASE) 50 mcg/act nasal spray   No No   Si spray into each nostril daily   gabapentin (Neurontin) 100 mg capsule   No No   Sig: Take 1 capsule (100 mg total) by mouth 3 (three) times a day During a pain flare-up   ibuprofen (MOTRIN) 600 mg tablet   No No   Sig: Take 1 tablet (600 mg total) by mouth every 6 (six) hours as needed for mild pain   levalbuterol (Xopenex HFA) 45 mcg/act inhaler   No No   Sig: Inhale 1-2 puffs every 4 (four) hours as needed for wheezing or shortness of breath   omeprazole (PriLOSEC) 40 MG capsule  Self No No   Sig: Take 1 capsule (40 mg total) by mouth daily      Facility-Administered Medications: None     Patient's Medications   Discharge Prescriptions    AZITHROMYCIN (ZITHROMAX Z-MILENA) 250 MG TABLET    Take 2 tabs x 1 day, then 1 tab x 4 days       Start Date: 2025  End Date: 2025       Order Dose: --       Quantity: 6 tablet    Refills: 0    BENZONATATE (TESSALON PERLES) 100 MG CAPSULE    Take 1 capsule (100 mg total) by mouth 3 (three) times a day as needed for cough for up to 7 days       Start Date: 2025  End Date: 2025       Order Dose: 100 mg       Quantity: 21 capsule    Refills: 0    PREDNISONE 20 MG TABLET    Take 2 tablets (40 mg total) by mouth daily for 4 days       Start Date: 2025  End Date: 2025       Order Dose: 40 mg        Quantity: 8 tablet    Refills: 0     No discharge procedures on file.  ED SEPSIS DOCUMENTATION   Time reflects when diagnosis was documented in both MDM as applicable and the Disposition within this note       Time User Action Codes Description Comment    2/5/2025 10:09 AM Roland Oquendo [J20.9] Acute bronchitis     2/5/2025 10:09 AM Roland Oquendo [J45.909] Asthma                  Roland Oquendo MD  02/05/25 1031

## 2025-02-05 NOTE — Clinical Note
Renee Wall was seen and treated in our emergency department on 2/5/2025.    No restrictions            Diagnosis:     Renee  may return to work on return date.    She may return on this date: 02/07/2025         If you have any questions or concerns, please don't hesitate to call.      Roland Oquendo MD    ______________________________           _______________          _______________  Hospital Representative                              Date                                Time

## 2025-03-31 ENCOUNTER — NURSE TRIAGE (OUTPATIENT)
Age: 40
End: 2025-03-31

## 2025-03-31 DIAGNOSIS — R10.33 UMBILICAL PAIN: Primary | ICD-10-CM

## 2025-03-31 NOTE — TELEPHONE ENCOUNTER
"FOLLOW UP: routing to provider    REASON FOR CONVERSATION: Abdominal Pain    SYMPTOMS: pain and tenderness in belly button and just above belly button    OTHER: Patient states she had a hysterectomy in December. States starting Friday she's had some abdominal pain in belly button that radiates just above belly button. The area is also tender to touch. Patient states she had incision in belly button and wondering if this could be related to healing. Advised may be scar tissue that is healing. Can tylenol/motrin, rest and call back with worsening. Routing to provider for further recs.     DISPOSITION: Home Care      Reason for Disposition   Mild abdominal pain    Answer Assessment - Initial Assessment Questions  1. LOCATION: \"Where does it hurt?\"       Above belly button  2. RADIATION: \"Does the pain shoot anywhere else?\" (e.g., chest, back)      Belly button to right above belly button  3. ONSET: \"When did the pain begin?\" (e.g., minutes, hours or days ago)       3 days ago  4. SUDDEN: \"Gradual or sudden onset?\"      gradual  5. PATTERN \"Does the pain come and go, or is it constant?\"      constant  6. SEVERITY: \"How bad is the pain?\"  (e.g., Scale 1-10; mild, moderate, or severe)      6/10  7. RECURRENT SYMPTOM: \"Have you ever had this type of stomach pain before?\" If Yes, ask: \"When was the last time?\" and \"What happened that time?\"       denies  8. CAUSE: \"What do you think is causing the stomach pain?\"        9. RELIEVING/AGGRAVATING FACTORS: \"What makes it better or worse?\" (e.g., antacids, bending or twisting motion, bowel movement)      Worse when pressure is applied  10. OTHER SYMPTOMS: \"Do you have any other symptoms?\" (e.g., back pain, diarrhea, fever, urination pain, vomiting)        Nausea--  11. PREGNANCY: \"Is there any chance you are pregnant?\" \"When was your last menstrual period?\"        hysterectomy    Protocols used: Abdominal Pain - Female-Adult-[place]    "

## 2025-03-31 NOTE — TELEPHONE ENCOUNTER
At this point postop, shouldn't be directly post-surgical complications. Scar tissue is possible, as is incisional hernia. Needs US to better assess for hernia. Will place order for same.

## 2025-04-12 DIAGNOSIS — E66.813 OBESITY, CLASS III, BMI 40-49.9 (MORBID OBESITY): ICD-10-CM

## 2025-04-14 ENCOUNTER — TELEPHONE (OUTPATIENT)
Dept: BARIATRICS | Facility: CLINIC | Age: 40
End: 2025-04-14

## 2025-04-14 RX ORDER — SEMAGLUTIDE 1 MG/.5ML
INJECTION, SOLUTION SUBCUTANEOUS
Qty: 2 ML | Refills: 0 | OUTPATIENT
Start: 2025-04-14

## 2025-04-14 NOTE — TELEPHONE ENCOUNTER
Left vm to schedule follow up apt due to not being seen since 6/2024. Pt prescription refill request for wegovy has been denied until she is seen for a follow up

## 2025-04-25 ENCOUNTER — TELEPHONE (OUTPATIENT)
Dept: BARIATRICS | Facility: CLINIC | Age: 40
End: 2025-04-25

## 2025-05-13 ENCOUNTER — HOSPITAL ENCOUNTER (OUTPATIENT)
Dept: ULTRASOUND IMAGING | Facility: HOSPITAL | Age: 40
Discharge: HOME | End: 2025-05-13
Attending: OBSTETRICS & GYNECOLOGY
Payer: COMMERCIAL

## 2025-05-13 DIAGNOSIS — R10.33 UMBILICAL PAIN: ICD-10-CM

## 2025-05-13 PROCEDURE — 76705 ECHO EXAM OF ABDOMEN: CPT

## 2025-05-28 ENCOUNTER — TELEPHONE (OUTPATIENT)
Dept: BARIATRICS | Facility: CLINIC | Age: 40
End: 2025-05-28

## (undated) DEVICE — SUT MONOCRYL 4-0 PS-2 27 IN Y426H

## (undated) DEVICE — [HIGH FLOW INSUFFLATOR,  DO NOT USE IF PACKAGE IS DAMAGED,  KEEP DRY,  KEEP AWAY FROM SUNLIGHT,  PROTECT FROM HEAT AND RADIOACTIVE SOURCES.]: Brand: PNEUMOSURE

## (undated) DEVICE — INTENDED FOR TISSUE SEPARATION, AND OTHER PROCEDURES THAT REQUIRE A SHARP SURGICAL BLADE TO PUNCTURE OR CUT.: Brand: BARD-PARKER SAFETY BLADES SIZE 11, STERILE

## (undated) DEVICE — TUBING SMOKE EVAC W/FILTRATION DEVICE PLUMEPORT ACTIV

## (undated) DEVICE — ELECTRODE LAP J HOOK E-Z CLEAN 33CM-0021

## (undated) DEVICE — ELECTRODE LAP L WIRE E-Z CLEAN 33CM -0100

## (undated) DEVICE — SCD SEQUENTIAL COMPRESSION COMFORT SLEEVE MEDIUM KNEE LENGTH: Brand: KENDALL SCD

## (undated) DEVICE — 40595 XL TRENDELENBURG POSITIONING KIT: Brand: 40595 XL TRENDELENBURG POSITIONING KIT

## (undated) DEVICE — TROCAR: Brand: KII FIOS FIRST ENTRY

## (undated) DEVICE — BETHLEHEM UNIVERSAL GYN LAP PK: Brand: CARDINAL HEALTH

## (undated) DEVICE — STRL COTTON TIP APPLCTR 6IN PK: Brand: CARDINAL HEALTH

## (undated) DEVICE — BLUE HEAT SCOPE WARMER

## (undated) DEVICE — PREMIUM DRY TRAY LF: Brand: MEDLINE INDUSTRIES, INC.

## (undated) DEVICE — IV EXTENSION TUBING 33 IN

## (undated) DEVICE — 3000CC GUARDIAN II: Brand: GUARDIAN

## (undated) DEVICE — DRAPE EQUIPMENT RF WAND

## (undated) DEVICE — UTERINE MANIPULATOR RUMI 6.7 X 8 CM

## (undated) DEVICE — TROCAR: Brand: KII SLEEVE

## (undated) DEVICE — GLOVE PI ULTRA TOUCH SZ.7.0

## (undated) DEVICE — ENSEAL X1 TISSUE SEALER, CURVED JAW, 37 CM SHAFT LENGTH: Brand: ENSEAL

## (undated) DEVICE — SUT VICRYL 0 CT-1 36 IN J946H

## (undated) DEVICE — ENSEAL X1 STRAIGHT 37CM SHAFT: Brand: HARMONIC

## (undated) DEVICE — GLOVE INDICATOR PI UNDERGLOVE SZ 7 BLUE

## (undated) DEVICE — MAYO STAND COVER: Brand: CONVERTORS

## (undated) DEVICE — TRAY FOLEY 16FR URIMETER SILICONE SURESTEP

## (undated) DEVICE — SUT STRATAFIX SPIRAL 2-0 CT-1 30 CM SXPP1B410

## (undated) DEVICE — INSTRUMENT POUCH: Brand: CONVERTORS

## (undated) DEVICE — SYRINGE 50ML LL

## (undated) DEVICE — EXOFIN PRECISION PEN HIGH VISCOSITY TOPICAL SKIN ADHESIVE: Brand: EXOFIN PRECISION PEN, 1G

## (undated) DEVICE — CHLORAPREP HI-LITE 26ML ORANGE

## (undated) DEVICE — ENDOPATH 5MM ENDOSCOPIC BLUNT TIP DISSECTORS (12 POUCHES CONTAINING 3 DISSECTORS EACH): Brand: ENDOPATH